# Patient Record
Sex: FEMALE | Race: BLACK OR AFRICAN AMERICAN | Employment: UNEMPLOYED | URBAN - METROPOLITAN AREA
[De-identification: names, ages, dates, MRNs, and addresses within clinical notes are randomized per-mention and may not be internally consistent; named-entity substitution may affect disease eponyms.]

---

## 2017-04-18 LAB
LEFT VENTRICULAR EJECTION FRACTION HIGH VALUE: 60 %
LEFT VENTRICULAR EJECTION FRACTION MODE: NORMAL
LV EF: 55 %

## 2018-05-28 ENCOUNTER — APPOINTMENT (OUTPATIENT)
Dept: GENERAL RADIOLOGY | Age: 63
DRG: 871 | End: 2018-05-28
Attending: EMERGENCY MEDICINE
Payer: MEDICARE

## 2018-05-28 ENCOUNTER — HOSPITAL ENCOUNTER (INPATIENT)
Age: 63
LOS: 2 days | Discharge: HOME | DRG: 871 | End: 2018-05-30
Attending: EMERGENCY MEDICINE | Admitting: INTERNAL MEDICINE
Payer: MEDICARE

## 2018-05-28 DIAGNOSIS — J96.01 ACUTE RESPIRATORY FAILURE WITH HYPOXIA (HCC): ICD-10-CM

## 2018-05-28 DIAGNOSIS — N18.9 CHRONIC RENAL FAILURE, UNSPECIFIED CKD STAGE: ICD-10-CM

## 2018-05-28 DIAGNOSIS — J18.9 PNEUMONIA OF RIGHT LOWER LOBE DUE TO INFECTIOUS ORGANISM: ICD-10-CM

## 2018-05-28 DIAGNOSIS — A41.9 SEPSIS, DUE TO UNSPECIFIED ORGANISM: Primary | ICD-10-CM

## 2018-05-28 DIAGNOSIS — D63.8 ANEMIA OF CHRONIC DISEASE: ICD-10-CM

## 2018-05-28 LAB
ALBUMIN SERPL-MCNC: 2.9 G/DL (ref 3.5–5)
ALBUMIN/GLOB SERPL: 0.8 {RATIO} (ref 1.1–2.2)
ALP SERPL-CCNC: 134 U/L (ref 45–117)
ALT SERPL-CCNC: 134 U/L (ref 12–78)
ANION GAP SERPL CALC-SCNC: 8 MMOL/L (ref 5–15)
APPEARANCE UR: CLEAR
ARTERIAL PATENCY WRIST A: ABNORMAL
AST SERPL-CCNC: 81 U/L (ref 15–37)
BACTERIA URNS QL MICRO: ABNORMAL /HPF
BASE DEFICIT BLDA-SCNC: 4.6 MMOL/L
BASOPHILS # BLD: 0 K/UL (ref 0–0.1)
BASOPHILS NFR BLD: 0 % (ref 0–1)
BDY SITE: ABNORMAL
BILIRUB SERPL-MCNC: 0.9 MG/DL (ref 0.2–1)
BILIRUB UR QL: NEGATIVE
BNP SERPL-MCNC: 4172 PG/ML (ref 0–125)
BREATHS.SPONTANEOUS ON VENT: 18
BUN SERPL-MCNC: 61 MG/DL (ref 6–20)
BUN/CREAT SERPL: 19 (ref 12–20)
CALCIUM SERPL-MCNC: 7.8 MG/DL (ref 8.5–10.1)
CHLORIDE SERPL-SCNC: 111 MMOL/L (ref 97–108)
CK MB CFR SERPL CALC: 3 % (ref 0–2.5)
CK MB SERPL-MCNC: 2.4 NG/ML (ref 5–25)
CK SERPL-CCNC: 81 U/L (ref 26–192)
CO2 SERPL-SCNC: 20 MMOL/L (ref 21–32)
COLOR UR: ABNORMAL
CREAT SERPL-MCNC: 3.27 MG/DL (ref 0.55–1.02)
DIFFERENTIAL METHOD BLD: ABNORMAL
EOSINOPHIL # BLD: 0 K/UL (ref 0–0.4)
EOSINOPHIL NFR BLD: 0 % (ref 0–7)
EPITH CASTS URNS QL MICRO: ABNORMAL /LPF
ERYTHROCYTE [DISTWIDTH] IN BLOOD BY AUTOMATED COUNT: 17 % (ref 11.5–14.5)
GLOBULIN SER CALC-MCNC: 3.7 G/DL (ref 2–4)
GLUCOSE BLD STRIP.AUTO-MCNC: 131 MG/DL (ref 65–100)
GLUCOSE BLD STRIP.AUTO-MCNC: 136 MG/DL (ref 65–100)
GLUCOSE BLD STRIP.AUTO-MCNC: 136 MG/DL (ref 65–100)
GLUCOSE SERPL-MCNC: 131 MG/DL (ref 65–100)
GLUCOSE UR STRIP.AUTO-MCNC: NEGATIVE MG/DL
HCO3 BLDA-SCNC: 21 MMOL/L (ref 22–26)
HCT VFR BLD AUTO: 23.8 % (ref 35–47)
HGB BLD-MCNC: 7.4 G/DL (ref 11.5–16)
HGB UR QL STRIP: NEGATIVE
IMM GRANULOCYTES # BLD: 0.2 K/UL (ref 0–0.04)
IMM GRANULOCYTES NFR BLD AUTO: 1 % (ref 0–0.5)
KETONES UR QL STRIP.AUTO: NEGATIVE MG/DL
LACTATE SERPL-SCNC: 0.7 MMOL/L (ref 0.4–2)
LEUKOCYTE ESTERASE UR QL STRIP.AUTO: ABNORMAL
LYMPHOCYTES # BLD: 1 K/UL (ref 0.8–3.5)
LYMPHOCYTES NFR BLD: 6 % (ref 12–49)
MCH RBC QN AUTO: 25.3 PG (ref 26–34)
MCHC RBC AUTO-ENTMCNC: 31.1 G/DL (ref 30–36.5)
MCV RBC AUTO: 81.5 FL (ref 80–99)
MONOCYTES # BLD: 1.5 K/UL (ref 0–1)
MONOCYTES NFR BLD: 8 % (ref 5–13)
NEUTS SEG # BLD: 16.3 K/UL (ref 1.8–8)
NEUTS SEG NFR BLD: 86 % (ref 32–75)
NITRITE UR QL STRIP.AUTO: NEGATIVE
NRBC # BLD: 0 K/UL (ref 0–0.01)
NRBC BLD-RTO: 0 PER 100 WBC
PCO2 BLDA: 40 MMHG (ref 35–45)
PH BLDA: 7.33 [PH] (ref 7.35–7.45)
PH UR STRIP: 5 [PH] (ref 5–8)
PLATELET # BLD AUTO: 171 K/UL (ref 150–400)
PMV BLD AUTO: 12.5 FL (ref 8.9–12.9)
PO2 BLDA: 57 MMHG (ref 80–100)
POTASSIUM SERPL-SCNC: 4.2 MMOL/L (ref 3.5–5.1)
PROT SERPL-MCNC: 6.6 G/DL (ref 6.4–8.2)
PROT UR STRIP-MCNC: 100 MG/DL
RBC # BLD AUTO: 2.92 M/UL (ref 3.8–5.2)
RBC #/AREA URNS HPF: ABNORMAL /HPF (ref 0–5)
SAO2 % BLD: 88 % (ref 92–97)
SAO2% DEVICE SAO2% SENSOR NAME: ABNORMAL
SERVICE CMNT-IMP: ABNORMAL
SODIUM SERPL-SCNC: 139 MMOL/L (ref 136–145)
SP GR UR REFRACTOMETRY: 1.01 (ref 1–1.03)
SPECIMEN SITE: ABNORMAL
TROPONIN I SERPL-MCNC: 0.08 NG/ML
TROPONIN I SERPL-MCNC: 0.08 NG/ML
UROBILINOGEN UR QL STRIP.AUTO: 0.2 EU/DL (ref 0.2–1)
WBC # BLD AUTO: 19.1 K/UL (ref 3.6–11)
WBC URNS QL MICRO: ABNORMAL /HPF (ref 0–4)
YEAST BUDDING URNS QL: PRESENT

## 2018-05-28 PROCEDURE — 65660000000 HC RM CCU STEPDOWN

## 2018-05-28 PROCEDURE — 74011250636 HC RX REV CODE- 250/636: Performed by: INTERNAL MEDICINE

## 2018-05-28 PROCEDURE — 81001 URINALYSIS AUTO W/SCOPE: CPT | Performed by: EMERGENCY MEDICINE

## 2018-05-28 PROCEDURE — 94640 AIRWAY INHALATION TREATMENT: CPT

## 2018-05-28 PROCEDURE — 96374 THER/PROPH/DIAG INJ IV PUSH: CPT

## 2018-05-28 PROCEDURE — 74011250636 HC RX REV CODE- 250/636: Performed by: EMERGENCY MEDICINE

## 2018-05-28 PROCEDURE — 74011000258 HC RX REV CODE- 258: Performed by: EMERGENCY MEDICINE

## 2018-05-28 PROCEDURE — 74011000250 HC RX REV CODE- 250: Performed by: EMERGENCY MEDICINE

## 2018-05-28 PROCEDURE — 36415 COLL VENOUS BLD VENIPUNCTURE: CPT | Performed by: EMERGENCY MEDICINE

## 2018-05-28 PROCEDURE — 80053 COMPREHEN METABOLIC PANEL: CPT | Performed by: EMERGENCY MEDICINE

## 2018-05-28 PROCEDURE — 99285 EMERGENCY DEPT VISIT HI MDM: CPT

## 2018-05-28 PROCEDURE — 82962 GLUCOSE BLOOD TEST: CPT

## 2018-05-28 PROCEDURE — 87040 BLOOD CULTURE FOR BACTERIA: CPT | Performed by: EMERGENCY MEDICINE

## 2018-05-28 PROCEDURE — 84484 ASSAY OF TROPONIN QUANT: CPT | Performed by: EMERGENCY MEDICINE

## 2018-05-28 PROCEDURE — 93005 ELECTROCARDIOGRAM TRACING: CPT

## 2018-05-28 PROCEDURE — 85025 COMPLETE CBC W/AUTO DIFF WBC: CPT | Performed by: EMERGENCY MEDICINE

## 2018-05-28 PROCEDURE — 82550 ASSAY OF CK (CPK): CPT | Performed by: EMERGENCY MEDICINE

## 2018-05-28 PROCEDURE — 36600 WITHDRAWAL OF ARTERIAL BLOOD: CPT | Performed by: INTERNAL MEDICINE

## 2018-05-28 PROCEDURE — 74011000250 HC RX REV CODE- 250: Performed by: INTERNAL MEDICINE

## 2018-05-28 PROCEDURE — 83605 ASSAY OF LACTIC ACID: CPT | Performed by: EMERGENCY MEDICINE

## 2018-05-28 PROCEDURE — 83880 ASSAY OF NATRIURETIC PEPTIDE: CPT | Performed by: EMERGENCY MEDICINE

## 2018-05-28 PROCEDURE — 87070 CULTURE OTHR SPECIMN AEROBIC: CPT | Performed by: INTERNAL MEDICINE

## 2018-05-28 PROCEDURE — 77030029684 HC NEB SM VOL KT MONA -A

## 2018-05-28 PROCEDURE — 71045 X-RAY EXAM CHEST 1 VIEW: CPT

## 2018-05-28 PROCEDURE — 74011250637 HC RX REV CODE- 250/637: Performed by: INTERNAL MEDICINE

## 2018-05-28 PROCEDURE — 82803 BLOOD GASES ANY COMBINATION: CPT | Performed by: INTERNAL MEDICINE

## 2018-05-28 PROCEDURE — 74011636637 HC RX REV CODE- 636/637: Performed by: INTERNAL MEDICINE

## 2018-05-28 RX ORDER — IPRATROPIUM BROMIDE AND ALBUTEROL SULFATE 2.5; .5 MG/3ML; MG/3ML
3 SOLUTION RESPIRATORY (INHALATION)
Status: DISCONTINUED | OUTPATIENT
Start: 2018-05-28 | End: 2018-05-29

## 2018-05-28 RX ORDER — INSULIN LISPRO 100 [IU]/ML
INJECTION, SOLUTION INTRAVENOUS; SUBCUTANEOUS
Status: DISCONTINUED | OUTPATIENT
Start: 2018-05-28 | End: 2018-05-30 | Stop reason: HOSPADM

## 2018-05-28 RX ORDER — FLUTICASONE PROPIONATE 50 MCG
2 SPRAY, SUSPENSION (ML) NASAL 2 TIMES DAILY
COMMUNITY

## 2018-05-28 RX ORDER — ESCITALOPRAM OXALATE 10 MG/1
5 TABLET ORAL DAILY
Status: DISCONTINUED | OUTPATIENT
Start: 2018-05-29 | End: 2018-05-30 | Stop reason: HOSPADM

## 2018-05-28 RX ORDER — ACETAMINOPHEN 325 MG/1
650 TABLET ORAL
Status: DISCONTINUED | OUTPATIENT
Start: 2018-05-28 | End: 2018-05-30 | Stop reason: HOSPADM

## 2018-05-28 RX ORDER — LORATADINE 10 MG/1
10 TABLET ORAL DAILY
COMMUNITY

## 2018-05-28 RX ORDER — HYDRALAZINE HYDROCHLORIDE 50 MG/1
100 TABLET, FILM COATED ORAL 3 TIMES DAILY
Status: DISCONTINUED | OUTPATIENT
Start: 2018-05-28 | End: 2018-05-30 | Stop reason: HOSPADM

## 2018-05-28 RX ORDER — ALBUTEROL SULFATE 0.83 MG/ML
10 SOLUTION RESPIRATORY (INHALATION)
Status: COMPLETED | OUTPATIENT
Start: 2018-05-28 | End: 2018-05-28

## 2018-05-28 RX ORDER — LEVOFLOXACIN 5 MG/ML
750 INJECTION, SOLUTION INTRAVENOUS
Status: DISCONTINUED | OUTPATIENT
Start: 2018-05-28 | End: 2018-05-29

## 2018-05-28 RX ORDER — ATORVASTATIN CALCIUM 40 MG/1
80 TABLET, FILM COATED ORAL DAILY
Status: DISCONTINUED | OUTPATIENT
Start: 2018-05-29 | End: 2018-05-30 | Stop reason: HOSPADM

## 2018-05-28 RX ORDER — DOCUSATE SODIUM 100 MG/1
100 CAPSULE, LIQUID FILLED ORAL
Status: DISCONTINUED | OUTPATIENT
Start: 2018-05-28 | End: 2018-05-30 | Stop reason: HOSPADM

## 2018-05-28 RX ORDER — SODIUM CHLORIDE 0.9 % (FLUSH) 0.9 %
5-10 SYRINGE (ML) INJECTION AS NEEDED
Status: DISCONTINUED | OUTPATIENT
Start: 2018-05-28 | End: 2018-05-30 | Stop reason: HOSPADM

## 2018-05-28 RX ORDER — IPRATROPIUM BROMIDE AND ALBUTEROL SULFATE 2.5; .5 MG/3ML; MG/3ML
3 SOLUTION RESPIRATORY (INHALATION)
COMMUNITY
End: 2018-10-17

## 2018-05-28 RX ORDER — CARVEDILOL 12.5 MG/1
25 TABLET ORAL 2 TIMES DAILY WITH MEALS
Status: DISCONTINUED | OUTPATIENT
Start: 2018-05-28 | End: 2018-05-30 | Stop reason: HOSPADM

## 2018-05-28 RX ORDER — AMLODIPINE BESYLATE 5 MG/1
5 TABLET ORAL 2 TIMES DAILY
COMMUNITY
End: 2018-10-17

## 2018-05-28 RX ORDER — ASPIRIN 81 MG/1
81 TABLET ORAL DAILY
Status: DISCONTINUED | OUTPATIENT
Start: 2018-05-29 | End: 2018-05-30 | Stop reason: HOSPADM

## 2018-05-28 RX ORDER — ATORVASTATIN CALCIUM 40 MG/1
80 TABLET, FILM COATED ORAL DAILY
COMMUNITY
End: 2018-10-17

## 2018-05-28 RX ORDER — ATENOLOL 100 MG/1
100 TABLET ORAL DAILY
COMMUNITY
End: 2018-05-28

## 2018-05-28 RX ORDER — ASPIRIN 81 MG/1
81 TABLET ORAL DAILY
COMMUNITY

## 2018-05-28 RX ORDER — DIPHENHYDRAMINE HYDROCHLORIDE 50 MG/ML
12.5 INJECTION, SOLUTION INTRAMUSCULAR; INTRAVENOUS
Status: DISCONTINUED | OUTPATIENT
Start: 2018-05-28 | End: 2018-05-30 | Stop reason: HOSPADM

## 2018-05-28 RX ORDER — AMLODIPINE BESYLATE 5 MG/1
5 TABLET ORAL 2 TIMES DAILY
Status: DISCONTINUED | OUTPATIENT
Start: 2018-05-28 | End: 2018-05-30 | Stop reason: HOSPADM

## 2018-05-28 RX ORDER — ONDANSETRON 2 MG/ML
4 INJECTION INTRAMUSCULAR; INTRAVENOUS
Status: DISCONTINUED | OUTPATIENT
Start: 2018-05-28 | End: 2018-05-30 | Stop reason: HOSPADM

## 2018-05-28 RX ORDER — SODIUM CHLORIDE 0.9 % (FLUSH) 0.9 %
5-10 SYRINGE (ML) INJECTION EVERY 8 HOURS
Status: DISCONTINUED | OUTPATIENT
Start: 2018-05-28 | End: 2018-05-30 | Stop reason: HOSPADM

## 2018-05-28 RX ORDER — PAROXETINE HYDROCHLORIDE 20 MG/1
30 TABLET, FILM COATED ORAL DAILY
Status: DISCONTINUED | OUTPATIENT
Start: 2018-05-29 | End: 2018-05-30 | Stop reason: HOSPADM

## 2018-05-28 RX ORDER — AMLODIPINE BESYLATE 10 MG/1
TABLET ORAL DAILY
COMMUNITY
End: 2018-05-28

## 2018-05-28 RX ORDER — VANCOMYCIN 1.75 GRAM/500 ML IN 0.9 % SODIUM CHLORIDE INTRAVENOUS
1750
Status: DISCONTINUED | OUTPATIENT
Start: 2018-05-30 | End: 2018-05-30

## 2018-05-28 RX ORDER — LORATADINE 10 MG/1
10 TABLET ORAL DAILY
Status: DISCONTINUED | OUTPATIENT
Start: 2018-05-29 | End: 2018-05-30 | Stop reason: HOSPADM

## 2018-05-28 RX ORDER — INSULIN LISPRO 100 [IU]/ML
16 INJECTION, SOLUTION INTRAVENOUS; SUBCUTANEOUS
COMMUNITY

## 2018-05-28 RX ORDER — INSULIN GLARGINE 100 [IU]/ML
16 INJECTION, SOLUTION SUBCUTANEOUS
Status: DISCONTINUED | OUTPATIENT
Start: 2018-05-28 | End: 2018-05-30 | Stop reason: HOSPADM

## 2018-05-28 RX ORDER — GUAIFENESIN 600 MG/1
600 TABLET, EXTENDED RELEASE ORAL EVERY 12 HOURS
Status: DISCONTINUED | OUTPATIENT
Start: 2018-05-28 | End: 2018-05-30 | Stop reason: HOSPADM

## 2018-05-28 RX ORDER — CARVEDILOL 25 MG/1
25 TABLET ORAL 2 TIMES DAILY WITH MEALS
COMMUNITY

## 2018-05-28 RX ORDER — FUROSEMIDE 40 MG/1
40 TABLET ORAL DAILY
COMMUNITY
End: 2018-10-17

## 2018-05-28 RX ORDER — MAGNESIUM SULFATE 100 %
4 CRYSTALS MISCELLANEOUS AS NEEDED
Status: DISCONTINUED | OUTPATIENT
Start: 2018-05-28 | End: 2018-05-30 | Stop reason: HOSPADM

## 2018-05-28 RX ORDER — DEXTROSE 50 % IN WATER (D50W) INTRAVENOUS SYRINGE
12.5-25 AS NEEDED
Status: DISCONTINUED | OUTPATIENT
Start: 2018-05-28 | End: 2018-05-30 | Stop reason: HOSPADM

## 2018-05-28 RX ORDER — ATENOLOL 50 MG/1
100 TABLET ORAL DAILY
Status: DISCONTINUED | OUTPATIENT
Start: 2018-05-29 | End: 2018-05-28

## 2018-05-28 RX ORDER — ERGOCALCIFEROL 1.25 MG/1
50000 CAPSULE ORAL
COMMUNITY

## 2018-05-28 RX ORDER — PAROXETINE HYDROCHLORIDE 20 MG/1
60 TABLET, FILM COATED ORAL DAILY
Status: DISCONTINUED | OUTPATIENT
Start: 2018-05-29 | End: 2018-05-28

## 2018-05-28 RX ORDER — INSULIN LISPRO 100 [IU]/ML
INJECTION, SOLUTION INTRAVENOUS; SUBCUTANEOUS
COMMUNITY
End: 2018-10-17

## 2018-05-28 RX ORDER — ATENOLOL 100 MG/1
100 TABLET ORAL DAILY
Status: DISCONTINUED | OUTPATIENT
Start: 2018-05-29 | End: 2018-05-28

## 2018-05-28 RX ORDER — PAROXETINE 30 MG/1
30 TABLET, FILM COATED ORAL DAILY
COMMUNITY

## 2018-05-28 RX ORDER — FLUTICASONE PROPIONATE 50 MCG
2 SPRAY, SUSPENSION (ML) NASAL DAILY
Status: DISCONTINUED | OUTPATIENT
Start: 2018-05-29 | End: 2018-05-30 | Stop reason: HOSPADM

## 2018-05-28 RX ORDER — INSULIN GLARGINE 100 [IU]/ML
20 INJECTION, SOLUTION SUBCUTANEOUS
COMMUNITY

## 2018-05-28 RX ORDER — HYDRALAZINE HYDROCHLORIDE 100 MG/1
100 TABLET, FILM COATED ORAL 3 TIMES DAILY
COMMUNITY
End: 2018-10-17

## 2018-05-28 RX ADMIN — GUAIFENESIN 600 MG: 600 TABLET, EXTENDED RELEASE ORAL at 21:08

## 2018-05-28 RX ADMIN — METHYLPREDNISOLONE SODIUM SUCCINATE 125 MG: 125 INJECTION, POWDER, FOR SOLUTION INTRAMUSCULAR; INTRAVENOUS at 14:03

## 2018-05-28 RX ADMIN — VANCOMYCIN HYDROCHLORIDE 3000 MG: 10 INJECTION, POWDER, LYOPHILIZED, FOR SOLUTION INTRAVENOUS at 21:08

## 2018-05-28 RX ADMIN — CARVEDILOL 25 MG: 12.5 TABLET, FILM COATED ORAL at 19:37

## 2018-05-28 RX ADMIN — Medication 10 ML: at 23:17

## 2018-05-28 RX ADMIN — DIPHENHYDRAMINE HYDROCHLORIDE 12.5 MG: 50 INJECTION, SOLUTION INTRAMUSCULAR; INTRAVENOUS at 23:16

## 2018-05-28 RX ADMIN — INSULIN GLARGINE 16 UNITS: 100 INJECTION, SOLUTION SUBCUTANEOUS at 21:44

## 2018-05-28 RX ADMIN — Medication 10 ML: at 21:08

## 2018-05-28 RX ADMIN — Medication 10 ML: at 19:38

## 2018-05-28 RX ADMIN — IPRATROPIUM BROMIDE AND ALBUTEROL SULFATE 3 ML: .5; 3 SOLUTION RESPIRATORY (INHALATION) at 20:17

## 2018-05-28 RX ADMIN — Medication 10 ML: at 23:24

## 2018-05-28 RX ADMIN — ALBUTEROL SULFATE 10 MG: 2.5 SOLUTION RESPIRATORY (INHALATION) at 14:03

## 2018-05-28 RX ADMIN — LEVOFLOXACIN 750 MG: 5 INJECTION, SOLUTION INTRAVENOUS at 17:09

## 2018-05-28 RX ADMIN — METHYLPREDNISOLONE SODIUM SUCCINATE 40 MG: 40 INJECTION, POWDER, FOR SOLUTION INTRAMUSCULAR; INTRAVENOUS at 23:17

## 2018-05-28 RX ADMIN — PIPERACILLIN SODIUM,TAZOBACTAM SODIUM 3.38 G: 3; .375 INJECTION, POWDER, FOR SOLUTION INTRAVENOUS at 16:10

## 2018-05-28 RX ADMIN — AMLODIPINE BESYLATE 5 MG: 5 TABLET ORAL at 21:08

## 2018-05-28 RX ADMIN — HYDRALAZINE HYDROCHLORIDE 100 MG: 50 TABLET, FILM COATED ORAL at 21:08

## 2018-05-28 NOTE — PROGRESS NOTES
Pharmacy Automatic Renal Dosing Protocol - Antimicrobials    Indication for Antimicrobials: HCAP     Current Regimen of Each Antimicrobial:  Vancomycin 3000mg IV loading, then 1750mg IV every 48hr (Start Date 18; Day # )  Levofloxacin 750mg IV every 48hr (start date: 18, Day 1)  Zosyn 3.375g IV x 1     Previous Antimicrobial Therapy:      Goal Level: VANCOMYCIN TROUGH GOAL RANGE    Vancomycin Trough: 15 - 20 mcg/mL    Date Dose & Interval Measured (mcg/mL) Extrapolated (mcg/mL)                       Significant Cultures:   18---paired blood - pending    Radiology / Imaging results: (X-ray, CT scan or MRI):   --chest xray- right lower lobe pneumonia and/or asymmetric pulmonary edema    Paralysis, amputations, malnutrition: n/a    Labs:  Recent Labs      18   1335   CREA  3.27*   BUN  61*   WBC  19.1*     Temp (24hrs), Av.8 °F (37.1 °C), Min:98.8 °F (37.1 °C), Max:98.8 °F (37.1 °C)    Creatinine Clearance (mL/min) or Dialysis: ~16mL/min (IDW)    Impression/Plan:   · Levofloxacin 750mg IV every 48hr ok for indication and renal function, patient is borderline for dose adjustment to 500mg every 48hr, but renal might be improved with  labs. Will adjust based on  BMP. · Zosyn 3.375g IV x 1 given in ED  · Vancomycin 3000mg IV loading dose, followed by 1750mg IV every 48hr to yield a trough of 16mcg/mL based on population kinetic parameters. Vancomycin pharmacy to dose, with duration of 7 days  · BMP ordered by MD for   · Antimicrobial stop date 7 days vanco, TBD for levofloxacin. Pharmacy will follow daily and adjust medications as appropriate for renal function and/or serum levels. Thank you,  GERMAIN Paulson    Recommended duration of therapy  [email or web address]/[place]/[facility]/Pharmacy/Clinical%20Companion/Duration%20of%[email or web address]. docx    Renal Dosing  [email or web address]/[place]/[facility]/Pharmacy/Clinical%20Companion/Renal%[id number]. pdf

## 2018-05-28 NOTE — ED PROVIDER NOTES
EMERGENCY DEPARTMENT HISTORY AND PHYSICAL EXAM      Date: 5/28/2018  Patient Name: Escobar Pompa    History of Presenting Illness     Chief Complaint   Patient presents with    Shortness of Breath     pt checked her blood sugar and found it low (it has since recovered), which caused her to have an anxiety attack with sob. Pt called EMS. on presentation to the ED, pt states her anxiety is improved, but not resolved; however, she is still experiencing dyspnea with audible wheezes and rhonchi    Anxiety       History Provided By: Patient and EMS    HPI: Escobar Pompa, 60 y.o. female with PMHx significant for COPD, DM, HTN, presents via EMS to the ED for evaluation of worsening SOB and anxiety ~1:00 AM today after checking her blood sugar and finding it to be low. Pt states she checked her blood sugar before going to bed and was noted to be 111, but dropped to 76 ~1:00 AM, which caused her to panic. Per EMS, she was anxious and panicking with deep breaths. She was sating at 90% on RA and was placed on 4L sating at 92%. Upon arrival, her anxiety has improved. However, she still is experiencing SOB. Pt endorses SOB for the past couple of days with associated cough. She states breathing is worse with any exertion. Pt notes brown sputum production with her cough, but has been unable to bring anything up with her cough today. She reports non-radiating L sided CP earlier this morning that occurred every couple of minutes intermittently before resolving. Pt conveys CP was 7/10 in severity and specifically denies any associated N/V or diaphoresis. She reports she was admitted at [facility] for 7 days for newly diagnosed COPD. During her hospitalization, she states her Cr went from 4.1 to 2.9 and informs she has a hx of CKD. She further adds experiencing intermittent bilateral lower extremity tingling for the past couple of days, but denies any leg pain.  Additionally, she notes intermittent bilateral leg swelling, but none today. Pt denies being on home O2. She specifically denies any recent fevers, chills, N/V, or abdominal pain. Social hx: -tobacco, -EtOH, -illicit drugs    There are no other complaints, changes, or physical findings at this time. PCP: Wille Harada, NP    Past History     Past Medical History:  Past Medical History:   Diagnosis Date    Diabetes ([address]     Hypertension        Past Surgical History:  Past Surgical History:   Procedure Laterality Date    HX CHOLECYSTECTOMY      HX GYN          HX HEENT      tonsillectomy       Family History:  No family history on file. Social History:  Social History   Substance Use Topics    Smoking status: Never Smoker    Smokeless tobacco: Never Used    Alcohol use No       Allergies: Allergies   Allergen Reactions    Codeine Hives    Lisinopril Hives         Review of Systems   Review of Systems   Constitutional: Negative for chills, diaphoresis and fever. HENT: Negative for congestion. Eyes: Negative for visual disturbance. Respiratory: Positive for cough and shortness of breath. Cardiovascular: Positive for chest pain and leg swelling (none today). Gastrointestinal: Negative for abdominal pain, nausea and vomiting. Endocrine: Negative for heat intolerance. Genitourinary: Negative for dysuria. Musculoskeletal: Negative for myalgias. Skin: Negative for rash. Allergic/Immunologic: Negative for immunocompromised state. Neurological: Positive for numbness (tingling in BL lower extremities). Hematological: Does not bruise/bleed easily. Psychiatric/Behavioral: The patient is nervous/anxious. All other systems reviewed and are negative. Physical Exam   Physical Exam   Constitutional: She is oriented to person, place, and time. She appears well-developed. No distress. Elevated BMI   HENT:   Head: Normocephalic and atraumatic. Eyes: EOM are normal. Pupils are equal, round, and reactive to light.    Neck: Normal range of motion. Neck supple. Cardiovascular: Normal rate, regular rhythm and normal heart sounds. Pulmonary/Chest:   Moderate respiratory distress  Rales and rhonchi throughout  Chest wall tenderness   Abdominal: Soft. Bowel sounds are normal. She exhibits no mass. There is no tenderness. Musculoskeletal: Normal range of motion. 2+ edema bilateral lower extremities, non-tender. Neurological: She is alert and oriented to person, place, and time. Coordination normal.   Skin: Skin is warm and dry. Psychiatric: She has a normal mood and affect. Her behavior is normal.   Nursing note and vitals reviewed. Diagnostic Study Results   Labs    Recent Results (from the past 12 hour(s))   GLUCOSE, POC    Collection Time: 05/28/18  1:05 PM   Result Value Ref Range    Glucose (POC) 136 (H) 65 - 100 mg/dL    Performed by Nan Bhardwaj    CBC WITH AUTOMATED DIFF    Collection Time: 05/28/18  1:35 PM   Result Value Ref Range    WBC 19.1 (H) 3.6 - 11.0 K/uL    RBC 2.92 (L) 3.80 - 5.20 M/uL    HGB 7.4 (L) 11.5 - 16.0 g/dL    HCT 23.8 (L) 35.0 - 47.0 %    MCV 81.5 80.0 - 99.0 FL    MCH 25.3 (L) 26.0 - 34.0 PG    MCHC 31.1 30.0 - 36.5 g/dL    RDW 17.0 (H) 11.5 - 14.5 %    PLATELET 855 086 - 708 K/uL    MPV 12.5 8.9 - 12.9 FL    NRBC 0.0 0  WBC    ABSOLUTE NRBC 0.00 0.00 - 0.01 K/uL    NEUTROPHILS 86 (H) 32 - 75 %    LYMPHOCYTES 6 (L) 12 - 49 %    MONOCYTES 8 5 - 13 %    EOSINOPHILS 0 0 - 7 %    BASOPHILS 0 0 - 1 %    IMMATURE GRANULOCYTES 1 (H) 0.0 - 0.5 %    ABS. NEUTROPHILS 16.3 (H) 1.8 - 8.0 K/UL    ABS. LYMPHOCYTES 1.0 0.8 - 3.5 K/UL    ABS. MONOCYTES 1.5 (H) 0.0 - 1.0 K/UL    ABS. EOSINOPHILS 0.0 0.0 - 0.4 K/UL    ABS. BASOPHILS 0.0 0.0 - 0.1 K/UL    ABS. IMM.  GRANS. 0.2 (H) 0.00 - 0.04 K/UL    DF AUTOMATED     METABOLIC PANEL, COMPREHENSIVE    Collection Time: 05/28/18  1:35 PM   Result Value Ref Range    Sodium 139 136 - 145 mmol/L    Potassium 4.2 3.5 - 5.1 mmol/L    Chloride 111 (H) 97 - 108 mmol/L    CO2 20 (L) 21 - 32 mmol/L    Anion gap 8 5 - 15 mmol/L    Glucose 131 (H) 65 - 100 mg/dL    BUN 61 (H) 6 - 20 MG/DL    Creatinine 3.27 (H) 0.55 - 1.02 MG/DL    BUN/Creatinine ratio 19 12 - 20      GFR est AA 17 (L) >60 ml/min/1.73m2    GFR est non-AA 14 (L) >60 ml/min/1.73m2    Calcium 7.8 (L) 8.5 - 10.1 MG/DL    Bilirubin, total 0.9 0.2 - 1.0 MG/DL    ALT (SGPT) 134 (H) 12 - 78 U/L    AST (SGOT) 81 (H) 15 - 37 U/L    Alk. phosphatase 134 (H) 45 - 117 U/L    Protein, total 6.6 6.4 - 8.2 g/dL    Albumin 2.9 (L) 3.5 - 5.0 g/dL    Globulin 3.7 2.0 - 4.0 g/dL    A-G Ratio 0.8 (L) 1.1 - 2.2     NT-PRO BNP    Collection Time: 05/28/18  1:35 PM   Result Value Ref Range    NT pro-BNP 4172 (H) 0 - 125 PG/ML   CK W/ CKMB & INDEX    Collection Time: 05/28/18  1:35 PM   Result Value Ref Range    CK 81 26 - 192 U/L    CK - MB 2.4 <3.6 NG/ML    CK-MB Index 3.0 (H) 0 - 2.5     TROPONIN I    Collection Time: 05/28/18  1:35 PM   Result Value Ref Range    Troponin-I, Qt. 0.08 (H) <0.05 ng/mL       Radiologic Studies -   CXR Results  (Last 48 hours)               05/28/18 1422  XR CHEST PORT Final result    Impression:  IMPRESSION: Right lower lobe pneumonia and/or asymmetric pulmonary edema. Narrative:  EXAM: Portable CXR. 1415 hours         INDICATION: Chest Pain       COMPARISON: None. There is right lower lobe airspace disease and bilateral perihilar haziness with   considerations including right lower lobe pneumonia and/or asymmetric pulmonary   edema. Heart is borderline large. There is no pneumothorax, midline shift or   sizable pleural effusion. Medical Decision Making   I am the first provider for this patient. I reviewed the vital signs, available nursing notes, past medical history, past surgical history, family history and social history. Vital Signs-Reviewed the patient's vital signs.   Patient Vitals for the past 12 hrs:   Temp Pulse Resp BP SpO2   05/28/18 1330 - 79 23 - 97 %   05/28/18 1300 - - - 131/57 96 %   05/28/18 1255 98.8 °F (37.1 °C) 80 20 96/70 96 %       Pulse Oximetry Analysis - 97% on RA    Cardiac Monitor:   Rate: 80 bpm  Rhythm: Normal Sinus Rhythm     EKG interpretation: (Preliminary) 13:03  Rhythm: normal sinus rhythm; and regular . Rate (approx.): 80; Axis: normal; AK interval: normal; QRS interval: normal ; ST/T wave: normal; Other findings: no prior EKGs. Written by Sergei Zaragoza, ED Scribe, as dictated by Cori Zhu MD.    Records Reviewed: Nursing Notes, Old Medical Records and Ambulance Run Sheet    Provider Notes (Medical Decision Making):   DDx: CHF, COPD, CAD, respiratory failure, PNA, bronchitis, peripheral edema    ED Course:   Initial assessment performed. The patients presenting problems have been discussed, and they are in agreement with the care plan formulated and outlined with them. I have encouraged them to ask questions as they arise throughout their visit. 2:59 PM  Will hold off on fluid bolus given pt has a hx of CHF and elevated BNP today. CONSULT NOTE:   3:03 PM  Cori Zhu MD spoke with Yimi Maciel MD,   Specialty: Hospitalist  Discussed pt's hx, disposition, and available diagnostic and imaging results. Reviewed care plans. Consultant will evaluate pt for admission. Written by Sergei Zaragoza ED Scribe, as dictated by Cori Zhu MD.    4:57 PM  Received medical records from [facility]. Pt was admitted from 5/13-5/20 for AKA with acute CKD, pulmonary hypertension, and NSTEMI.  Will update hospitalist.    CRITICAL CARE NOTE:  5:34 PM  IMPENDING DETERIORATION -Respiratory, Cardiovascular, Metabolic and Renal  ASSOCIATED RISK FACTORS - Hypotension, Hypoxia, Dysrhythmia, Metabolic changes and Dehydration  MANAGEMENT- Bedside Assessment and Supervision of Care  INTERPRETATION -  Xrays, Blood Gases, ECG and Blood Pressure  INTERVENTIONS - hemodynamic mngmt and Metobolic interventions  CASE REVIEW - Hospitalist and Nursing  TREATMENT RESPONSE -Unchanged   PERFORMED BY - Self  NOTES   :  I have spent 76 minutes of critical care time involved in lab review, consultations with specialist, family decision- making, bedside attention and documentation. During this entire length of time I was immediately available to the patient. Naina Morse MD    PLAN:  1. Admit to Hospitalist    Admission Note:  3:08 PM  Patient is being admitted to the hospital by Dr. Sheri Mitchell. The results of their tests and reasons for their admission have been discussed with them and available family. They convey agreement and understanding for the need to be admitted and for their admission diagnosis. Diagnosis     Clinical Impression:   1. Sepsis, due to unspecified organism ([address]    2. Pneumonia of right lower lobe due to infectious organism ([address]    3. Acute respiratory failure with hypoxia (HCC)    4. Chronic renal failure, unspecified CKD stage    5. Anemia of chronic disease        Attestations: This note is prepared by Arianna Lloyd, acting as Scribe for Naina Morse MD.    The scribe's documentation has been prepared under my direction and personally reviewed by me in its entirety. I confirm that the note above accurately reflects all work, treatment, procedures, and medical decision making performed by me.   Naina Morse MD

## 2018-05-28 NOTE — H&P
Hospitalist Admission Note    NAME: Jocelin Adair   :  1955   MRN:  [id number]     Date/Time:  2018 4:16 PM    Patient PCP: Florentin Turpin NP  ________________________________________________________________________    My assessment of this patient's clinical condition and my plan of care is as follows. Assessment / Plan:  Sepsis due to pneumonia and COPD exacerbation  -RR >25, WBC 19, infiltrate on CXR, wheeze/rhonchi on exam  -given recent hospitalization will treat with broad antibiotics, vanc and levaquin  -standing nebs  -IV steroids  -follow cultures  -checked ABG - no significant CO2 retention    Chronic diastolic heart failure  -does not appear overloaded on my exam  -patient had negative stress test last week   -will hold lasix for now, consider restarting tomorrow    CKD stage 4  - states when she left [facility] her Cr was 2.9, now 3.27  -will hold lasix for now monitor Cr  -follows with Christel Alcantar nephrologist    DM2  -will order lower dose lantus  -SSI    Anemia, likely chronic disease  -started on EPO 10 K weekly last week    Anxiety with panic attacks  -patient and daughter are concerned that paxil is not working, discussed and will start tapering off paxil, will trial escitalopram    Obesity  Body mass index is 45.93 kg/(m^2). Code Status: full  Surrogate Decision Maker: daughter Bernardino Caban [phone]    DVT Prophylaxis: SCD  Baseline: independent, recently discharged from [facility] last week        Subjective:   CHIEF COMPLAINT:   Chief Complaint   Patient presents with    Shortness of Breath     pt checked her blood sugar and found it low (it has since recovered), which caused her to have an anxiety attack with sob. Pt called EMS.   on presentation to the ED, pt states her anxiety is improved, but not resolved; however, she is still experiencing dyspnea with audible wheezes and rhonchi    Anxiety       HISTORY OF PRESENT ILLNESS:     Rosalie Moody Juan Carlos Dale is a 60 y.o.  female with history of COPD, DM, HTN, CKD who presents with worsening SOB and wheezing. Patient was discharged from [facility] last week for COPD exacerbation, she is currently on a prednisone taper. She was feeling well until suddenly last night she began having shortness of breath and productive cough. She also was diagnosed NSTEMI and cardiomyopathy and had a stress test which was negative at that time. She denies any fevers or chills. She had associated chest wall pain which has now resolved she believes it was gas. We were asked to admit for work up and evaluation of the above problems. Past Medical History:   Diagnosis Date    CKD (chronic kidney disease) stage 4, GFR 15-29 ml/min (HCC)     COPD (chronic obstructive pulmonary disease) (HCC)     Diabetes (HCC)     Diastolic heart failure (HCC)     Hypertension     Pulmonary HTN (HCC)         Past Surgical History:   Procedure Laterality Date    HX CHOLECYSTECTOMY      HX GYN          HX HEENT      tonsillectomy       Social History   Substance Use Topics    Smoking status: Never Smoker    Smokeless tobacco: Never Used    Alcohol use No        Fam Hx: hypertension     Allergies   Allergen Reactions    Codeine Hives    Lisinopril Hives        Prior to Admission medications    Medication Sig Start Date End Date Taking? Authorizing Provider   atenolol (TENORMIN) 100 mg tablet Take 100 mg by mouth daily. Yes Magdalena Gmaa MD   atorvastatin (LIPITOR) 40 mg tablet Take 40 mg by mouth daily. Yes Magdalena Gama MD   insulin glargine (LANTUS U-100 INSULIN) 100 unit/mL injection 35 Units by SubCUTAneous route nightly. Yes Magdalena Gama MD   insulin lispro (HUMALOG U-100 INSULIN) 100 unit/mL injection 60 Units by SubCUTAneous route Before breakfast, lunch, and dinner. Yes Magdalena Gama MD   insulin lispro (HUMALOG U-100 INSULIN) 100 unit/mL injection by SubCUTAneous route.  Sliding scale   Yes Phys Other, MD   aspirin delayed-release 81 mg tablet Take 81 mg by mouth daily. Yes Magdalena Gama MD   loratadine (CLARITIN) 10 mg tablet Take 10 mg by mouth. Yes Magdalena Gama MD   albuterol-ipratropium (DUO-NEB) 2.5 mg-0.5 mg/3 ml nebu 3 mL by Nebulization route. Yes Magdalena Gama MD   furosemide (LASIX) 40 mg tablet Take 40 mg by mouth daily. Yes Magdalena Gama MD       REVIEW OF SYSTEMS:       Total of 12 systems reviewed as follows:       POSITIVE= underlined text  Negative = text not underlined  General:  fever, chills, sweats, generalized weakness, weight loss/gain,      loss of appetite   Eyes:    blurred vision, eye pain, loss of vision, double vision  ENT:    rhinorrhea, pharyngitis   Respiratory:   cough, sputum production, SOB, COOPER, wheezing, pleuritic pain   Cardiology:   chest pain, palpitations, orthopnea, PND, edema, syncope   Gastrointestinal:  abdominal pain , N/V, diarrhea, dysphagia, constipation, bleeding   Genitourinary:  frequency, urgency, dysuria, hematuria, incontinence   Muskuloskeletal :  arthralgia, myalgia, back pain  Hematology:  easy bruising, nose or gum bleeding, lymphadenopathy   Dermatological: rash, ulceration, pruritis, color change / jaundice  Endocrine:   hot flashes or polydipsia   Neurological:  headache, dizziness, confusion, focal weakness, paresthesia,     Speech difficulties, memory loss, gait difficulty  Psychological: Feelings of anxiety, depression, agitation    Objective:   VITALS:    Patient Vitals for the past 12 hrs:   Temp Pulse Resp BP SpO2   05/28/18 1545 - 78 22 - 96 %   05/28/18 1530 - 77 26 152/64 99 %   05/28/18 1500 - 79 21 142/58 98 %   05/28/18 1430 - 77 23 144/64 98 %   05/28/18 1330 - 79 23 - 97 %   05/28/18 1300 - - - 131/57 96 %   05/28/18 1255 98.8 °F (37.1 °C) 80 20 96/70 96 %         PHYSICAL EXAM:    General:    Drowsy but cooperative, no distress, appears stated age.      HEENT: Atraumatic, anicteric sclerae, pink conjunctivae     No oral ulcers, oral mucosa moist, throat clear, dentition fair  Neck:  Supple, symmetrical  Lungs:   Diffuse rhonchi in all lung fields, no crackles  Chest wall:  No tenderness, no accessory muscle use. Heart:   Regular rhythm, no murmur, no edema on my exam  Abdomen:   Soft, non-tender, not distended, bowel sounds normal  Extremities: No cyanosis, no clubbing, warm, well perfused, radial pulse 2+  Skin:     Not pale, not jaundiced, no rashes   Psych:  Good insight, not depressed, not anxious or agitated. Neurologic: EOMs intact, face symmetric, no aphasia or slurred speech, strength 5/5 in BUE, 5/5 in BLE, sensation grossly intact, alert and oriented x 4.     _______________________________________________________________________  Care Plan discussed with:    Comments   Patient x    Family  x Daughter    RN     Care Manager                    Consultant:      _______________________________________________________________________  Expected  Disposition:   Home with Family x   HH/PT/OT/RN    SNF/LTC    RAMOS    ________________________________________________________________________  TOTAL TIME:  54 Minutes    Critical Care Provided     Minutes non procedure based      Comments     Reviewed previous records   >50% of visit spent in counseling and coordination of care  Discussion with patient and/or family and questions answered       ________________________________________________________________________  Lavinia Jolly MD    Procedures: see electronic medical records for all procedures/Xrays and details which were not copied into this note but were reviewed prior to creation of Plan.     LAB DATA REVIEWED:    Recent Results (from the past 24 hour(s))   GLUCOSE, POC    Collection Time: 05/28/18  1:05 PM   Result Value Ref Range    Glucose (POC) 136 (H) 65 - 100 mg/dL    Performed by Gus Ruffin    CBC WITH AUTOMATED DIFF    Collection Time: 05/28/18  1:35 PM   Result Value Ref Range    WBC 19.1 (H) 3.6 - 11.0 K/uL    RBC 2.92 (L) 3.80 - 5.20 M/uL    HGB 7.4 (L) 11.5 - 16.0 g/dL    HCT 23.8 (L) 35.0 - 47.0 %    MCV 81.5 80.0 - 99.0 FL    MCH 25.3 (L) 26.0 - 34.0 PG    MCHC 31.1 30.0 - 36.5 g/dL    RDW 17.0 (H) 11.5 - 14.5 %    PLATELET 896 537 - 749 K/uL    MPV 12.5 8.9 - 12.9 FL    NRBC 0.0 0  WBC    ABSOLUTE NRBC 0.00 0.00 - 0.01 K/uL    NEUTROPHILS 86 (H) 32 - 75 %    LYMPHOCYTES 6 (L) 12 - 49 %    MONOCYTES 8 5 - 13 %    EOSINOPHILS 0 0 - 7 %    BASOPHILS 0 0 - 1 %    IMMATURE GRANULOCYTES 1 (H) 0.0 - 0.5 %    ABS. NEUTROPHILS 16.3 (H) 1.8 - 8.0 K/UL    ABS. LYMPHOCYTES 1.0 0.8 - 3.5 K/UL    ABS. MONOCYTES 1.5 (H) 0.0 - 1.0 K/UL    ABS. EOSINOPHILS 0.0 0.0 - 0.4 K/UL    ABS. BASOPHILS 0.0 0.0 - 0.1 K/UL    ABS. IMM. GRANS. 0.2 (H) 0.00 - 0.04 K/UL    DF AUTOMATED     METABOLIC PANEL, COMPREHENSIVE    Collection Time: 05/28/18  1:35 PM   Result Value Ref Range    Sodium 139 136 - 145 mmol/L    Potassium 4.2 3.5 - 5.1 mmol/L    Chloride 111 (H) 97 - 108 mmol/L    CO2 20 (L) 21 - 32 mmol/L    Anion gap 8 5 - 15 mmol/L    Glucose 131 (H) 65 - 100 mg/dL    BUN 61 (H) 6 - 20 MG/DL    Creatinine 3.27 (H) 0.55 - 1.02 MG/DL    BUN/Creatinine ratio 19 12 - 20      GFR est AA 17 (L) >60 ml/min/1.73m2    GFR est non-AA 14 (L) >60 ml/min/1.73m2    Calcium 7.8 (L) 8.5 - 10.1 MG/DL    Bilirubin, total 0.9 0.2 - 1.0 MG/DL    ALT (SGPT) 134 (H) 12 - 78 U/L    AST (SGOT) 81 (H) 15 - 37 U/L    Alk.  phosphatase 134 (H) 45 - 117 U/L    Protein, total 6.6 6.4 - 8.2 g/dL    Albumin 2.9 (L) 3.5 - 5.0 g/dL    Globulin 3.7 2.0 - 4.0 g/dL    A-G Ratio 0.8 (L) 1.1 - 2.2     NT-PRO BNP    Collection Time: 05/28/18  1:35 PM   Result Value Ref Range    NT pro-BNP 4172 (H) 0 - 125 PG/ML   CK W/ CKMB & INDEX    Collection Time: 05/28/18  1:35 PM   Result Value Ref Range    CK 81 26 - 192 U/L    CK - MB 2.4 <3.6 NG/ML    CK-MB Index 3.0 (H) 0 - 2.5     TROPONIN I    Collection Time: 05/28/18  1:35 PM   Result Value Ref Range    Troponin-I, Qt. 0.08 (H) <0.05 ng/mL LACTIC ACID    Collection Time: 05/28/18  3:34 PM   Result Value Ref Range    Lactic acid 0.7 0.4 - 2.0 MMOL/L

## 2018-05-28 NOTE — ED NOTES
Verbal shift change report given to [facility] (oncoming nurse) by Floresita Velasco (offgoing nurse). Report included the following information SBAR, ED Summary, Intake/Output, MAR and Recent Results. Pt on monitor x 3. Call bell in reach.

## 2018-05-28 NOTE — IP AVS SNAPSHOT
[address] [place] [address] 280 5 [facility] 
[phone] Patient: Steve Ngo MRN: [id number] [facility]:[id number] About your hospitalization You were admitted on:  May 28, 2018 You last received care in the:  [facility] You were discharged on:  May 30, 2018 Why you were hospitalized Your primary diagnosis was:  Not on File Your diagnoses also included:  Sepsis (Hcc) Follow-up Information Follow up With Details Comments Contact Shana Craig on 6/1/2018 Yasir Murphy will contact you 24hrs prior to the appointment to set up a transportation time  [address] [place] [address] 
[phone] Discharge Orders None A check hanna indicates which time of day the medication should be taken. My Medications START taking these medications Instructions Each Dose to Equal  
 Morning Noon Evening Bedtime  
 fluconazole 150 mg tablet Commonly known as:  DIFLUCAN Your last dose was: Your next dose is: Take 1 Tab by mouth daily for 14 days. FDA advises cautious prescribing of oral fluconazole in pregnancy. 150 mg  
    
   
   
   
  
 levoFLOXacin 750 mg tablet Commonly known as:  Nolan Gores Your last dose was: Your next dose is: Take 1 Tab by mouth daily for 5 days. 750 mg  
    
   
   
   
  
 predniSONE 20 mg tablet Commonly known as:  Xochitl Waterser Your last dose was: Your next dose is: Take 3 tabs by mouth twice a day for 3 days. Then take 2 tabs twice a day for 3 days, then take 1 tab twice a day for 3 days, then take 1 tab daily for 3 days. CONTINUE taking these medications Instructions Each Dose to Equal  
 Morning Noon Evening Bedtime  
 albuterol-ipratropium 2.5 mg-0.5 mg/3 ml Nebu Commonly known as:  Oddis Pillion Your last dose was: Your next dose is:    
   
   
 3 mL by Nebulization route. 3 mL  
    
   
   
   
  
 amLODIPine 5 mg tablet Commonly known as:  Leandra Webb Your last dose was: Your next dose is: Take 5 mg by mouth two (2) times a day. 5 mg  
    
   
   
   
  
 aspirin delayed-release 81 mg tablet Your last dose was: Your next dose is: Take 81 mg by mouth daily. 81 mg  
    
   
   
   
  
 atorvastatin 40 mg tablet Commonly known as:  LIPITOR Your last dose was: Your next dose is: Take 80 mg by mouth daily. 80 mg CLARITIN 10 mg tablet Generic drug:  loratadine Your last dose was: Your next dose is: Take 10 mg by mouth. 10 mg COREG 25 mg tablet Generic drug:  carvedilol Your last dose was: Your next dose is: Take 25 mg by mouth two (2) times daily (with meals). 25 mg  
    
   
   
   
  
 FLONASE ALLERGY RELIEF 50 mcg/actuation nasal spray Generic drug:  fluticasone Your last dose was: Your next dose is: 2 Sprays by Both Nostrils route daily. 2 Spray * HumaLOG U-100 Insulin 100 unit/mL injection Generic drug:  insulin lispro Your last dose was: Your next dose is:    
   
   
 60 Units by SubCUTAneous route Before breakfast, lunch, and dinner. 60 Units * HumaLOG U-100 Insulin 100 unit/mL injection Generic drug:  insulin lispro Your last dose was: Your next dose is:    
   
   
 by SubCUTAneous route. Sliding scale  
     
   
   
   
  
 hydrALAZINE 100 mg tablet Commonly known as:  APRESOLINE Your last dose was: Your next dose is: Take 100 mg by mouth three (3) times daily. 100 mg  
    
   
   
   
  
 LANTUS U-100 INSULIN 100 unit/mL injection Generic drug:  insulin glargine Your last dose was: Your next dose is:    
   
   
 35 Units by SubCUTAneous route nightly. 35 Units LASIX 40 mg tablet Generic drug:  furosemide Your last dose was: Your next dose is: Take 40 mg by mouth daily. 40 mg PAXIL 30 mg tablet Generic drug:  PARoxetine Your last dose was: Your next dose is: Take 60 mg by mouth daily. 60 mg  
    
   
   
   
  
 VITAMIN D2 50,000 unit capsule Generic drug:  ergocalciferol Your last dose was: Your next dose is: Take 50,000 Units by mouth every seven (7) days. 84419 Units * Notice: This list has 2 medication(s) that are the same as other medications prescribed for you. Read the directions carefully, and ask your doctor or other care provider to review them with you. Where to Get Your Medications Information on where to get these meds will be given to you by the nurse or doctor. ! Ask your nurse or doctor about these medications  
  fluconazole 150 mg tablet  
 levoFLOXacin 750 mg tablet  
 predniSONE 20 mg tablet Discharge Instructions None [facility] Announcement We are excited to announce that we are making your provider's discharge notes available to you in meebee. You will see these notes when they are completed and signed by the physician that discharged you from your recent hospital stay. If you have any questions or concerns about any information you see in meebee, please call the Health Information Department where you were seen or reach out to your Primary Care Provider for more information about your plan of care. Introducing [facility]! Cary Molina introduces meebee patient portal. Now you can access parts of your medical record, email your doctor's office, and request medication refills online. 1. In your internet browser, go to [email or web address] 2. Click on the First Time User? Click Here link in the Sign In box. You will see the New Member Sign Up page. 3. Enter your Pindrop Security Access Code exactly as it appears below. You will not need to use this code after youve completed the sign-up process. If you do not sign up before the expiration date, you must request a new code. · Pind[employer] Access Code: [email or web address] Expires: 8/26/2018 12:56 PM 
 
4. Enter the last four digits of your Social Security Number (xxxx) and Date of Birth (mm/dd/yyyy) as indicated and click Submit. You will be taken to the next sign-up page. 5. Create a Lineagent ID. This will be your Pindrop Security login ID and cannot be changed, so think of one that is secure and easy to remember. 6. Create a Pindrop [employer] password. You can change your password at any time. 7. Enter your Password Reset Question and Answer. This can be used at a later time if you forget your password. 8. Enter your e-mail address. You will receive e-mail notification when new information is available in [address] 9. Click Sign Up. You can now view and download portions of your medical record. 10. Click the Download Summary menu link to download a portable copy of your medical information. If you have questions, please visit the Frequently Asked Questions section of the [employer] website. Remember, [employer] is NOT to be used for urgent needs. For medical emergencies, dial 911. Now available from your iPhone and Android! Introducing Dharmesh Galeano As a [facility] patient, I wanted to make you aware of our electronic visit tool called Dharmesh Galeano. [facility] 24/7 allows you to connect within minutes with a medical provider 24 hours a day, seven days a week via a mobile device or tablet or logging into a secure website from your computer. You can access Dharmesh Galeano from anywhere in the [place]. A virtual visit might be right for you when you have a simple condition and feel like you just dont want to get out of bed, or cant get away from work for an appointment, when your regular [facility] [employer] provider is not available (evenings, weekends or holidays), or when youre out of town and need minor care. Electronic visits cost only $49 and if the [employer] 24/7 provider determines a prescription is needed to treat your condition, one can be electronically transmitted to a nearby pharmacy*. Please take a moment to enroll today if you have not already done so. The enrollment process is free and takes just a few minutes. To enroll, please download the [employer] 24/7 joan to your tablet or phone, or visit [email or web address] to enroll on your computer. And, as an [address] [place] [address] patient with a Daio account, the results of your visits will be scanned into your electronic medical record and your primary care provider will be able to view the scanned results. We urge you to continue to see your regular [facility] provider for your ongoing medical care. And while your primary care provider may not be the one available when you seek a MeterHero virtual visit, the peace of mind you get from getting a real diagnosis real time can be priceless. For more information on MeterHero, view our Frequently Asked Questions (FAQs) at [email or web address]. Sincerely, 
 
Niki Toure MD 
Chief Medical Officer Joey Linda Moore *:  certain medications cannot be prescribed via MeterHero Unresulted Labs-Please follow up with your PCP about these lab tests Order Current Status CULTURE, BLOOD, PAIRED Preliminary result CULTURE, RESPIRATORY/SPUTUM/BRONCH W GRAM STAIN Preliminary result Providers Seen During Your Hospitalization Provider Specialty Primary office phone Merary Hernandez MD Emergency Medicine [phone] Rg Rosales MD Internal Medicine [phone] Your Primary Care Physician (PCP) Primary Care Physician Office Phone Office Fax Kelsi Looney [phone] You are allergic to the following Allergen Reactions Codeine Hives Lisinopril Hives Recent Documentation Height Weight Breastfeeding? BMI Smoking Status 1.651 m 125.2 kg No 45.93 kg/m2 Never Smoker Emergency Contacts Name Discharge Info Relation Home Work Mobile [address] CAREGIVER [3] Child [2]   [phone] Patient Belongings The following personal items are in your possession at time of discharge: 
  Dental Appliances: At bedside, Uppers  Visual Aid: Glasses, At home      Home Medications: Kept at bedside   Jewelry: Ring (1)  Clothing: At bedside    Other Valuables: Cell Phone, Frannie Camp, With patient  Personal Items Sent to Safe: none (sent home with daughter) Please provide this summary of care documentation to your next provider. Signatures-by signing, you are acknowledging that this After Visit Summary has been reviewed with you and you have received a copy. Patient Signature:  ____________________________________________________________ Date:  ____________________________________________________________  
  
[facility] Provider Signature:  ____________________________________________________________ Date:  ____________________________________________________________

## 2018-05-28 NOTE — PROGRESS NOTES
Pharmacy Clarification of Prior to Admission Medication Regimen-Follow Up Needed    The patient was unable to participate in interview regarding clarification of the prior to admission medication regimen, due to AMS. Pharmacy attempted to clarify the prior to admission medication regimen with the patient's daughter, Assunta Spurling, [phone]. MHT called the patient's daughter and left a voicemail requesting PTA medication clarification. The patient's daughter has not called MHT back at this time. MHT is unable to clarify PTA with the patient's PCP on file due to the [facility] Day holiday. The medication history will need to be re-evaluated at a later time during admission when patient is willing/able to participate or if more information is provided.     Thank you,  Radha Rodriguez CPhT  Medication History Pharmacy Technician

## 2018-05-28 NOTE — ED NOTES
Bedside and Verbal shift change report given to [address] (oncoming nurse) by Salvatore Bacon RN (offgoing nurse). Report included the following information SBAR, ED Summary, MAR and Recent Results.

## 2018-05-28 NOTE — ED NOTES
TRANSFER - OUT REPORT:    Verbal report given to [facility] AT [facility] [place], RN on Hany Phelps  being transferred to [facility] for routine progression of care       Report consisted of patients Situation, Background, Assessment and   Recommendations(SBAR). Information from the following report(s) SBAR, Kardex, ED Summary, [facility] and Recent Results was reviewed with the receiving nurse. Lines:   Peripheral IV 05/28/18 Left Antecubital (Active)   Site Assessment Clean, dry, & intact 5/28/2018  1:34 PM   Phlebitis Assessment 0 5/28/2018  1:34 PM   Infiltration Assessment 0 5/28/2018  1:34 PM   Dressing Status Clean, dry, & intact 5/28/2018  1:34 PM   Dressing Type Transparent 5/28/2018  1:34 PM   Hub Color/Line Status Pink;Flushed 5/28/2018  1:34 PM   Action Taken Blood drawn 5/28/2018  1:34 PM        Opportunity for questions and clarification was provided.

## 2018-05-28 NOTE — ROUTINE PROCESS
Bedside and Verbal shift change report given to GUMARO Moreno (oncoming nurse) by Berna Galdamez RN, RN (offgoing nurse). Report included the following information SBAR, Kardex, Intake/Output, MAR and Recent Results.

## 2018-05-29 LAB
ANION GAP SERPL CALC-SCNC: 9 MMOL/L (ref 5–15)
ATRIAL RATE: 80 BPM
BUN SERPL-MCNC: 57 MG/DL (ref 6–20)
BUN/CREAT SERPL: 19 (ref 12–20)
CALCIUM SERPL-MCNC: 8 MG/DL (ref 8.5–10.1)
CALCULATED P AXIS, ECG09: 50 DEGREES
CALCULATED R AXIS, ECG10: 39 DEGREES
CALCULATED T AXIS, ECG11: 15 DEGREES
CHLORIDE SERPL-SCNC: 110 MMOL/L (ref 97–108)
CO2 SERPL-SCNC: 21 MMOL/L (ref 21–32)
CREAT SERPL-MCNC: 2.96 MG/DL (ref 0.55–1.02)
DIAGNOSIS, 93000: NORMAL
ERYTHROCYTE [DISTWIDTH] IN BLOOD BY AUTOMATED COUNT: 16.6 % (ref 11.5–14.5)
GLUCOSE BLD STRIP.AUTO-MCNC: 157 MG/DL (ref 65–100)
GLUCOSE BLD STRIP.AUTO-MCNC: 182 MG/DL (ref 65–100)
GLUCOSE BLD STRIP.AUTO-MCNC: 244 MG/DL (ref 65–100)
GLUCOSE BLD STRIP.AUTO-MCNC: 288 MG/DL (ref 65–100)
GLUCOSE BLD STRIP.AUTO-MCNC: 413 MG/DL (ref 65–100)
GLUCOSE SERPL-MCNC: 150 MG/DL (ref 65–100)
HCT VFR BLD AUTO: 23.5 % (ref 35–47)
HGB BLD-MCNC: 7.3 G/DL (ref 11.5–16)
MCH RBC QN AUTO: 25.2 PG (ref 26–34)
MCHC RBC AUTO-ENTMCNC: 31.1 G/DL (ref 30–36.5)
MCV RBC AUTO: 81 FL (ref 80–99)
NRBC # BLD: 0 K/UL (ref 0–0.01)
NRBC BLD-RTO: 0 PER 100 WBC
P-R INTERVAL, ECG05: 172 MS
PLATELET # BLD AUTO: 167 K/UL (ref 150–400)
PMV BLD AUTO: 12 FL (ref 8.9–12.9)
POTASSIUM SERPL-SCNC: 4.3 MMOL/L (ref 3.5–5.1)
Q-T INTERVAL, ECG07: 382 MS
QRS DURATION, ECG06: 80 MS
QTC CALCULATION (BEZET), ECG08: 440 MS
RBC # BLD AUTO: 2.9 M/UL (ref 3.8–5.2)
SERVICE CMNT-IMP: ABNORMAL
SODIUM SERPL-SCNC: 140 MMOL/L (ref 136–145)
VENTRICULAR RATE, ECG03: 80 BPM
WBC # BLD AUTO: 13.8 K/UL (ref 3.6–11)

## 2018-05-29 PROCEDURE — 74011000250 HC RX REV CODE- 250: Performed by: INTERNAL MEDICINE

## 2018-05-29 PROCEDURE — 80048 BASIC METABOLIC PNL TOTAL CA: CPT | Performed by: INTERNAL MEDICINE

## 2018-05-29 PROCEDURE — 65660000000 HC RM CCU STEPDOWN

## 2018-05-29 PROCEDURE — 85027 COMPLETE CBC AUTOMATED: CPT | Performed by: INTERNAL MEDICINE

## 2018-05-29 PROCEDURE — 74011636637 HC RX REV CODE- 636/637: Performed by: INTERNAL MEDICINE

## 2018-05-29 PROCEDURE — 74011250636 HC RX REV CODE- 250/636: Performed by: INTERNAL MEDICINE

## 2018-05-29 PROCEDURE — 94640 AIRWAY INHALATION TREATMENT: CPT

## 2018-05-29 PROCEDURE — 36415 COLL VENOUS BLD VENIPUNCTURE: CPT | Performed by: INTERNAL MEDICINE

## 2018-05-29 PROCEDURE — 77030033269 HC SLV COMPR SCD KNE2 CARD -B

## 2018-05-29 PROCEDURE — 74011250637 HC RX REV CODE- 250/637: Performed by: INTERNAL MEDICINE

## 2018-05-29 PROCEDURE — 82962 GLUCOSE BLOOD TEST: CPT

## 2018-05-29 RX ORDER — FAMOTIDINE 20 MG/1
20 TABLET, FILM COATED ORAL 2 TIMES DAILY
Status: DISCONTINUED | OUTPATIENT
Start: 2018-05-29 | End: 2018-05-29

## 2018-05-29 RX ORDER — FAMOTIDINE 20 MG/1
20 TABLET, FILM COATED ORAL DAILY
Status: DISCONTINUED | OUTPATIENT
Start: 2018-05-30 | End: 2018-05-30 | Stop reason: HOSPADM

## 2018-05-29 RX ORDER — LEVOFLOXACIN 5 MG/ML
500 INJECTION, SOLUTION INTRAVENOUS
Status: DISCONTINUED | OUTPATIENT
Start: 2018-05-30 | End: 2018-05-30

## 2018-05-29 RX ORDER — IPRATROPIUM BROMIDE AND ALBUTEROL SULFATE 2.5; .5 MG/3ML; MG/3ML
3 SOLUTION RESPIRATORY (INHALATION)
Status: DISCONTINUED | OUTPATIENT
Start: 2018-05-29 | End: 2018-05-30 | Stop reason: HOSPADM

## 2018-05-29 RX ORDER — IPRATROPIUM BROMIDE AND ALBUTEROL SULFATE 2.5; .5 MG/3ML; MG/3ML
3 SOLUTION RESPIRATORY (INHALATION)
Status: DISCONTINUED | OUTPATIENT
Start: 2018-05-30 | End: 2018-05-30 | Stop reason: HOSPADM

## 2018-05-29 RX ADMIN — ASPIRIN 81 MG: 81 TABLET, COATED ORAL at 08:53

## 2018-05-29 RX ADMIN — CARVEDILOL 25 MG: 12.5 TABLET, FILM COATED ORAL at 08:52

## 2018-05-29 RX ADMIN — IPRATROPIUM BROMIDE AND ALBUTEROL SULFATE 3 ML: .5; 3 SOLUTION RESPIRATORY (INHALATION) at 00:02

## 2018-05-29 RX ADMIN — IPRATROPIUM BROMIDE AND ALBUTEROL SULFATE 3 ML: .5; 3 SOLUTION RESPIRATORY (INHALATION) at 16:01

## 2018-05-29 RX ADMIN — INSULIN GLARGINE 16 UNITS: 100 INJECTION, SOLUTION SUBCUTANEOUS at 22:26

## 2018-05-29 RX ADMIN — HYDRALAZINE HYDROCHLORIDE 100 MG: 50 TABLET, FILM COATED ORAL at 17:43

## 2018-05-29 RX ADMIN — HYDRALAZINE HYDROCHLORIDE 100 MG: 50 TABLET, FILM COATED ORAL at 08:51

## 2018-05-29 RX ADMIN — INSULIN LISPRO 6 UNITS: 100 INJECTION, SOLUTION INTRAVENOUS; SUBCUTANEOUS at 22:25

## 2018-05-29 RX ADMIN — IPRATROPIUM BROMIDE AND ALBUTEROL SULFATE 3 ML: .5; 3 SOLUTION RESPIRATORY (INHALATION) at 03:40

## 2018-05-29 RX ADMIN — ESCITALOPRAM OXALATE 5 MG: 10 TABLET ORAL at 08:51

## 2018-05-29 RX ADMIN — Medication 10 ML: at 16:09

## 2018-05-29 RX ADMIN — INSULIN LISPRO 5 UNITS: 100 INJECTION, SOLUTION INTRAVENOUS; SUBCUTANEOUS at 17:43

## 2018-05-29 RX ADMIN — Medication 5 ML: at 22:25

## 2018-05-29 RX ADMIN — INSULIN LISPRO 3 UNITS: 100 INJECTION, SOLUTION INTRAVENOUS; SUBCUTANEOUS at 12:31

## 2018-05-29 RX ADMIN — ATORVASTATIN CALCIUM 80 MG: 40 TABLET, FILM COATED ORAL at 08:53

## 2018-05-29 RX ADMIN — AMLODIPINE BESYLATE 5 MG: 5 TABLET ORAL at 08:54

## 2018-05-29 RX ADMIN — GUAIFENESIN 600 MG: 600 TABLET, EXTENDED RELEASE ORAL at 22:25

## 2018-05-29 RX ADMIN — INSULIN LISPRO 2 UNITS: 100 INJECTION, SOLUTION INTRAVENOUS; SUBCUTANEOUS at 08:56

## 2018-05-29 RX ADMIN — Medication 10 ML: at 08:39

## 2018-05-29 RX ADMIN — PAROXETINE HYDROCHLORIDE 30 MG: 20 TABLET, FILM COATED ORAL at 08:52

## 2018-05-29 RX ADMIN — IPRATROPIUM BROMIDE AND ALBUTEROL SULFATE 3 ML: .5; 3 SOLUTION RESPIRATORY (INHALATION) at 07:08

## 2018-05-29 RX ADMIN — LIDOCAINE HYDROCHLORIDE 40 ML: 20 SOLUTION ORAL; TOPICAL at 17:44

## 2018-05-29 RX ADMIN — METHYLPREDNISOLONE SODIUM SUCCINATE 40 MG: 40 INJECTION, POWDER, FOR SOLUTION INTRAMUSCULAR; INTRAVENOUS at 12:31

## 2018-05-29 RX ADMIN — GUAIFENESIN 600 MG: 600 TABLET, EXTENDED RELEASE ORAL at 08:51

## 2018-05-29 RX ADMIN — IPRATROPIUM BROMIDE AND ALBUTEROL SULFATE 3 ML: .5; 3 SOLUTION RESPIRATORY (INHALATION) at 21:39

## 2018-05-29 RX ADMIN — LORATADINE 10 MG: 10 TABLET ORAL at 08:52

## 2018-05-29 RX ADMIN — AMLODIPINE BESYLATE 5 MG: 5 TABLET ORAL at 22:25

## 2018-05-29 RX ADMIN — HYDRALAZINE HYDROCHLORIDE 100 MG: 50 TABLET, FILM COATED ORAL at 22:24

## 2018-05-29 RX ADMIN — CARVEDILOL 25 MG: 12.5 TABLET, FILM COATED ORAL at 17:43

## 2018-05-29 RX ADMIN — IPRATROPIUM BROMIDE AND ALBUTEROL SULFATE 3 ML: .5; 3 SOLUTION RESPIRATORY (INHALATION) at 11:30

## 2018-05-29 RX ADMIN — FLUTICASONE PROPIONATE 2 SPRAY: 50 SPRAY, METERED NASAL at 09:02

## 2018-05-29 NOTE — PROGRESS NOTES
Pharmacy Automatic Renal Dosing Protocol - Antimicrobials    Indication for Antimicrobials: Sepsis due to HCAP & COPD exacerbagion    Current Regimen of Each Antimicrobial:  Vancomycin 3000mg IV loading, then 1750mg IV every 48hr (Start Date 18; Day # 2/7)  Levofloxacin 750mg IV every 48hr (start date: 18, Day 2)      Previous Antimicrobial Therapy:  Zosyn 3.375g IV x 1      Goal Level: VANCOMYCIN TROUGH GOAL RANGE    Vancomycin Trough: 15 - 20 mcg/mL    Date Dose & Interval Measured (mcg/mL) Extrapolated (mcg/mL)                       Significant Cultures:   : Blood = (pending)  : Respiratory = few WBCs; Yeast (pending)  : Nares = (pending)    Radiology / Imaging results: (X-ray, CT scan or MRI):   -: CXR: right lower lobe pneumonia and/or asymmetric pulmonary edema    Paralysis, amputations, malnutrition: n/a    Labs:  Recent Labs      18   0834  18   1335   CREA  2.96*  3.27*   BUN  57*  61*   WBC  13.8*  19.1*     Temp (24hrs), Av.4 °F (36.9 °C), Min:98.1 °F (36.7 °C), Max:98.6 °F (37 °C)    Creatinine Clearance (mL/min) or Dialysis: 18 ml/min    Impression/Plan:   · CrCl 18; change Levofloxacin to 500mg IV q48h  · Continue Vancomycin 1.75gm IV q48h; 1st maintenance dose tomorrow  · Daily BMP  · Antimicrobial stop date 7 days vanco, TBD for levofloxacin. Pharmacy will follow daily and adjust medications as appropriate for renal function and/or serum levels. Thank you,  Mildred Gregorio [facility]    Recommended duration of therapy  http://[email or web address]/[place]/[facility]/Pharmacy/Clinical%20Companion/[email or web address]%[email or web address]. docx    Renal Dosing  http://[facility]/[place]/[facility]/Pharmacy/Clinical%20Companion/Renal%[id number]. pdf

## 2018-05-29 NOTE — PROGRESS NOTES
Problem: Falls - Risk of  Goal: *Absence of Falls  Document Edmond Fall Risk and appropriate interventions in the flowsheet.    Outcome: Progressing Towards Goal  Fall Risk Interventions:  Mobility Interventions: Communicate number of staff needed for ambulation/transfer, OT consult for ADLs, Patient to call before getting OOB, PT Consult for mobility concerns, Strengthening exercises (ROM-active/passive), Utilize walker, cane, or other assitive device         Medication Interventions: Bed/chair exit alarm         History of Falls Interventions: Consult care management for discharge planning, Door open when patient unattended, Evaluate medications/consider consulting pharmacy, Room close to nurse's station, Utilize gait belt for transfer/ambulation, Investigate reason for fall, Bed/chair exit alarm

## 2018-05-29 NOTE — PROGRESS NOTES
Patient c/o itching to hands and feet bilaterally during vancomycin infusion. Vanco infusion stopped immediately. No rashes noted. Patient states hands and feet feel itchy. Dr. Juan Marin paged and informed of patient c/o. Order for benadryl given and to monitor patient.

## 2018-05-29 NOTE — PROGRESS NOTES
Reason for Admission:  Sepsis           RRAT Score:     8       Plan for utilizing home health:  As recommended              Likelihood of Readmission:  LOW as per RRAT score             Transition of Care Plan:       Pt is a 61 y/o -American female admitted for  Sepsis; Pt lives alone in a senior high rise with elevator access to her apartment. Pt is independent with ADLs not to include driving. Pt has PCA for 49 hours per week via Medicaid provider ([facility]). Pt has no previous SNF providers. Pt has access to cane for DME. Pt prefers to use [facility] pharmacy on [address]. Pt will be transported by family via private vehicle at discharge. CM met with pt to complete initial assessment. Pt is alert and oriented to person, place,time and situation. CM will continue to follow pt to assist with discharge plans as needed. Care Management Interventions  PCP Verified by CM: Yes Tip Cobb NP )  Mode of Transport at Discharge:  Other (see comment) (private vehicle )  Transition of Care Consult (CM Consult): Discharge Planning  Discharge Durable Medical Equipment: No  Health Maintenance Reviewed: Yes  Physical Therapy Consult: No  Occupational Therapy Consult: No  Speech Therapy Consult: No  Current Support Network: Lives Alone, Other ([place]ior [place] )  Confirm Follow Up Transport: Family  Plan discussed with Pt/Family/Caregiver: Yes  Discharge Location  Discharge Placement: Home with family assistance    RAHEL Anaya, [employer]   [phone]

## 2018-05-29 NOTE — PROGRESS NOTES
Hospitalist Progress Note      NAME: Edilberto Flores   :  1955  MRM:  [id number]    Date/Time: 2018  11:06 AM       Assessment / Plan:     Sepsis due to pneumonia and COPD exacerbation  RR >25, WBC 19, infiltrate on CXR, wheeze/rhonchi on exam.ABG - no significant CO2 retention  -given recent hospitalization will treat with broad antibiotics, vanc and levaquin  -standing nebs  -IV steroids  -follow cultures     Chronic diastolic heart failure  patient had negative stress test last week   -resume lasix      CKD stage 4  states when she left [facility] her Cr was 2.9, now 3.27  -follows with [facility] nephrologist     DM2  -will order lower dose lantus  -SSI     Anemia, likely chronic disease  -started on EPO 10 K weekly last week     Anxiety with panic attacks  -patient and daughter are concerned that paxil is not working, discussed and will start tapering off paxil, will trial escitalopram     Obesity  Body mass index is 45.93 kg/(m^2).     Code Status: full  Surrogate Decision Maker: daughter Maribel Risk [phone]     DVT Prophylaxis: SCD  Baseline: independent, recently discharged from [facility] last week         Subjective:     Chief Complaint:  C/o sob        ROS:  (bold if positive, if negative)      Tolerating PT  Tolerating Diet          Objective:       Vitals:          Last 24hrs VS reviewed since prior progress note.  Most recent are:    Visit Vitals    /67    Pulse 85    Temp 98.4 °F (36.9 °C)    Resp 20    Ht 5' 5\" (1.651 m)    Wt 125.2 kg (276 lb)    SpO2 100%    Breastfeeding No    BMI 45.93 kg/m2     SpO2 Readings from Last 6 Encounters:   18 100%          Intake/Output Summary (Last 24 hours) at 18 1106  Last data filed at 18 0715   Gross per 24 hour   Intake              900 ml   Output              475 ml   Net              425 ml          Exam:     Physical Exam:    Gen:  Well-developed, well-nourished, in no acute distress  HEENT:  Pink conjunctivae, PERRL, hearing intact to voice, moist mucous membranes  Neck:  Supple, without masses, thyroid non-tender  Resp:  No accessory muscle use, clear breath sounds without wheezes, rales or rhonchi  Card:  No murmurs, normal S1, S2 without thrills, bruits or peripheral edema  Abd:  Soft, non-tender, non-distended, normoactive bowel sounds are present  Musc:  No cyanosis, clubbing or edema  Skin:  No rashes or ulcers, skin turgor is good  Neuro:  Cranial nerves 3-12 are grossly intact, follows commands appropriately  Psych:  Good insight, oriented to person, place and time, alert       Telemetry reviewed:   normal sinus rhythm    Medications Reviewed: (see below)    Lab Data Reviewed: (see below)    ______________________________________________________________________    Medications:     Current Facility-Administered Medications   Medication Dose Route Frequency    levoFLOXacin (LEVAQUIN) 750 mg in D5W IVPB  750 mg IntraVENous Q48H    aspirin delayed-release tablet 81 mg  81 mg Oral DAILY    atorvastatin (LIPITOR) tablet 80 mg  80 mg Oral DAILY    loratadine (CLARITIN) tablet 10 mg  10 mg Oral DAILY    methylPREDNISolone (PF) (SOLU-MEDROL) injection 40 mg  40 mg IntraVENous Q12H    albuterol-ipratropium (DUO-NEB) 2.5 MG-0.5 MG/3 ML  3 mL Nebulization Q4H RT    guaiFENesin ER (MUCINEX) tablet 600 mg  600 mg Oral Q12H    sodium chloride (NS) flush 5-10 mL  5-10 mL IntraVENous Q8H    sodium chloride (NS) flush 5-10 mL  5-10 mL IntraVENous PRN    acetaminophen (TYLENOL) tablet 650 mg  650 mg Oral Q4H PRN    ondansetron (ZOFRAN) injection 4 mg  4 mg IntraVENous Q4H PRN    docusate sodium (COLACE) capsule 100 mg  100 mg Oral BID PRN    glucose chewable tablet 16 g  4 Tab Oral PRN    dextrose (D50W) injection syrg 12.5-25 g  12.5-25 g IntraVENous PRN    glucagon (GLUCAGEN) injection 1 mg  1 mg IntraMUSCular PRN    insulin lispro (HUMALOG) injection   SubCUTAneous AC&HS    insulin glargine (LANTUS) injection 16 Units  16 Units SubCUTAneous QHS    [START ON 5/30/2018] vancomycin (VANCOCIN) 1750 mg in  ml infusion  1,750 mg IntraVENous Q48H    amLODIPine (NORVASC) tablet 5 mg  5 mg Oral BID    carvedilol (COREG) tablet 25 mg  25 mg Oral BID WITH MEALS    hydrALAZINE (APRESOLINE) tablet 100 mg  100 mg Oral TID    fluticasone (FLONASE) 50 mcg/actuation nasal spray 2 Spray  2 Spray Both Nostrils DAILY    PARoxetine (PAXIL) tablet 30 mg  30 mg Oral DAILY    escitalopram oxalate (LEXAPRO) tablet 5 mg  5 mg Oral DAILY    diphenhydrAMINE (BENADRYL) injection 12.5 mg  12.5 mg IntraVENous Q6H PRN            Lab Review:     Recent Labs      05/29/18   0834  05/28/18   1335   WBC  13.8*  19.1*   HGB  7.3*  7.4*   HCT  23.5*  23.8*   PLT  167  171     Recent Labs      05/29/18   0834  05/28/18   1335   NA  140  139   K  4.3  4.2   CL  110*  111*   CO2  21  20*   GLU  150*  131*   BUN  57*  61*   CREA  2.96*  3.27*   CA  8.0*  7.8*   ALB   --   2.9*   TBILI   --   0.9   SGOT   --   81*   ALT   --   134*     Lab Results   Component Value Date/Time    Glucose (POC) 157 (H) 05/29/2018 07:26 AM    Glucose (POC) 182 (H) 05/29/2018 04:34 AM    Glucose (POC) 136 (H) 05/28/2018 09:04 PM    Glucose (POC) 131 (H) 05/28/2018 06:01 PM    Glucose (POC) 136 (H) 05/28/2018 01:05 PM         Total time spent with patient: 30 Minutes                  Care Plan discussed with: Patient    Code Status: Full    Prophylaxis:  Lovenox    Disposition:  Home w/Family           ___________________________________________________    Attending Physician: Irene Hlul MD

## 2018-05-29 NOTE — PROGRESS NOTES
Bedside shift change report given to Jose Leong 43 (oncoming nurse) by Laurie Vasquez RN (offgoing nurse). Report included the following information SBAR, Kardex, ED Summary, Intake/Output, MAR, Recent Results and Cardiac Rhythm NSR.

## 2018-05-29 NOTE — PROGRESS NOTES
ADULT PROTOCOL: JET AEROSOL ASSESSMENT    Patient  Jm Covarrubias     60 y.o.   female     5/28/2018  10:36 PM    Breath Sounds Pre Procedure: Right Breath Sounds: Coarse (slightly)                               Left Breath Sounds: Coarse (slightly)    Breath Sounds Post Procedure: Right Breath Sounds: Coarse                                 Left Breath Sounds: Coarse    Breathing pattern: Pre procedure Breathing Pattern: Regular          Post procedure Breathing Pattern: Regular    Heart Rate: Pre procedure Pulse: 78           Post procedure Pulse: 77    Resp Rate: Pre procedure Respirations: 20           Post procedure Respirations: 20    Peak Flow: Pre bronchodilator             Post bronchodilator       FVC/FEV1:  N/A    Incentive Spirometry:             Cough: Pre procedure Cough: Non-productive               Post procedure Cough: Non-productive, Congested    Suctioned: NO    Sputum: Pre procedure                   Post procedure      Oxygen: O2 Device: Room air   Room air     Changed: NO    SpO2: Pre procedure SpO2: 97 %   without oxygen              Post procedure    without oxygen    Nebulizer Therapy: Current medications Aerosolized Medications: DuoNeb      Changed: NO    Smoking History:    Smoking status: Never Smoker    Smokeless tobacco: Never Used         Problem List:   Patient Active Problem List   Diagnosis Code    Sepsis ([facility] A41.9       Respiratory Therapist: RT Sana

## 2018-05-29 NOTE — INTERDISCIPLINARY ROUNDS
Bedside interdisciplinary rounds were held today to discuss patient plan of care and outcomes. The following members were present: Physician, Nurse, Clinical Care Leader, Pharmacy, Physical Therapy, and Case Management. Plan:  PT/OT ordered. Antibiotics. Solumedrol.   SCDs

## 2018-05-29 NOTE — ROUTINE PROCESS
Bedside and Verbal shift change report given to [address] (oncoming nurse) by Bell Julio RN (offgoing nurse). Report included the following information SBAR, Kardex, Intake/Output, MAR, Recent Results and Cardiac Rhythm NSR   .

## 2018-05-29 NOTE — CDMP QUERY
Account Number: [de-identified]  MRN: [id number]  Patient: Loly Washington  Created: [id number]  Clinician Name: Eusebio Rosario RN, CCDS   Dr. Elian Watters :  Please clarify if this patient is (was) being treated/managed for:     => Acute respiratory failure with hypoxia POA in the setting of sepsis &copd exacerbation   => Other explanation of clinical findings  => Clinically Undetermined (no explanation for clinical findings)    The medical record reflects the following clinical findings, treatment, and risk factors. Risk Factors:  anxiety w/ panic attacks, anemia, sepsis, COPD exacerbation, CHF  Clinical Indicators:  SOB, wheezing, COOPER, tachypnea RR 23- 26, ABG on RA PO2 57, 88%  O2 sats. Treatment: IV solumedrol, O2 support, duonebs. Please clarify and document your clinical opinion in the progress notes and discharge summary including the definitive and/or presumptive diagnosis, (suspected or probable), related to the above clinical findings. Please include clinical findings supporting your diagnosis.   Thank Barby Silva RN, CCDS

## 2018-05-30 VITALS
TEMPERATURE: 97.7 F | BODY MASS INDEX: 45.98 KG/M2 | DIASTOLIC BLOOD PRESSURE: 52 MMHG | RESPIRATION RATE: 18 BRPM | HEIGHT: 65 IN | HEART RATE: 77 BPM | WEIGHT: 276 LBS | OXYGEN SATURATION: 97 % | SYSTOLIC BLOOD PRESSURE: 119 MMHG

## 2018-05-30 LAB
ANION GAP SERPL CALC-SCNC: 7 MMOL/L (ref 5–15)
BACTERIA SPEC CULT: NORMAL
BACTERIA SPEC CULT: NORMAL
BUN SERPL-MCNC: 10 MG/DL (ref 6–20)
BUN/CREAT SERPL: 14 (ref 12–20)
CALCIUM SERPL-MCNC: 9.4 MG/DL (ref 8.5–10.1)
CHLORIDE SERPL-SCNC: 104 MMOL/L (ref 97–108)
CO2 SERPL-SCNC: 28 MMOL/L (ref 21–32)
CREAT SERPL-MCNC: 0.73 MG/DL (ref 0.55–1.02)
GLUCOSE BLD STRIP.AUTO-MCNC: 305 MG/DL (ref 65–100)
GLUCOSE BLD STRIP.AUTO-MCNC: 364 MG/DL (ref 65–100)
GLUCOSE BLD STRIP.AUTO-MCNC: 388 MG/DL (ref 65–100)
GLUCOSE SERPL-MCNC: 124 MG/DL (ref 65–100)
POTASSIUM SERPL-SCNC: 3.4 MMOL/L (ref 3.5–5.1)
SERVICE CMNT-IMP: ABNORMAL
SERVICE CMNT-IMP: NORMAL
SODIUM SERPL-SCNC: 139 MMOL/L (ref 136–145)

## 2018-05-30 PROCEDURE — 74011636637 HC RX REV CODE- 636/637: Performed by: INTERNAL MEDICINE

## 2018-05-30 PROCEDURE — 82962 GLUCOSE BLOOD TEST: CPT

## 2018-05-30 PROCEDURE — 36415 COLL VENOUS BLD VENIPUNCTURE: CPT | Performed by: INTERNAL MEDICINE

## 2018-05-30 PROCEDURE — 74011250636 HC RX REV CODE- 250/636: Performed by: INTERNAL MEDICINE

## 2018-05-30 PROCEDURE — G8979 MOBILITY GOAL STATUS: HCPCS | Performed by: PHYSICAL THERAPIST

## 2018-05-30 PROCEDURE — 97165 OT EVAL LOW COMPLEX 30 MIN: CPT | Performed by: OCCUPATIONAL THERAPIST

## 2018-05-30 PROCEDURE — G8978 MOBILITY CURRENT STATUS: HCPCS | Performed by: PHYSICAL THERAPIST

## 2018-05-30 PROCEDURE — 74011000250 HC RX REV CODE- 250: Performed by: HOSPITALIST

## 2018-05-30 PROCEDURE — 97535 SELF CARE MNGMENT TRAINING: CPT | Performed by: OCCUPATIONAL THERAPIST

## 2018-05-30 PROCEDURE — 74011250637 HC RX REV CODE- 250/637: Performed by: INTERNAL MEDICINE

## 2018-05-30 PROCEDURE — 97161 PT EVAL LOW COMPLEX 20 MIN: CPT | Performed by: PHYSICAL THERAPIST

## 2018-05-30 PROCEDURE — 80048 BASIC METABOLIC PNL TOTAL CA: CPT | Performed by: INTERNAL MEDICINE

## 2018-05-30 PROCEDURE — 94640 AIRWAY INHALATION TREATMENT: CPT

## 2018-05-30 PROCEDURE — G8988 SELF CARE GOAL STATUS: HCPCS | Performed by: OCCUPATIONAL THERAPIST

## 2018-05-30 PROCEDURE — G8987 SELF CARE CURRENT STATUS: HCPCS | Performed by: OCCUPATIONAL THERAPIST

## 2018-05-30 RX ORDER — FLUCONAZOLE 150 MG/1
150 TABLET ORAL DAILY
Qty: 14 TAB | Refills: 0 | Status: SHIPPED | OUTPATIENT
Start: 2018-05-30 | End: 2018-06-13

## 2018-05-30 RX ORDER — LEVOFLOXACIN 750 MG/1
750 TABLET ORAL DAILY
Qty: 5 TAB | Refills: 0 | Status: SHIPPED | OUTPATIENT
Start: 2018-05-30 | End: 2018-06-04

## 2018-05-30 RX ORDER — VANCOMYCIN/0.9 % SOD CHLORIDE 1.5G/250ML
1500 PLASTIC BAG, INJECTION (ML) INTRAVENOUS EVERY 8 HOURS
Status: DISCONTINUED | OUTPATIENT
Start: 2018-05-30 | End: 2018-05-30 | Stop reason: HOSPADM

## 2018-05-30 RX ORDER — PREDNISONE 20 MG/1
TABLET ORAL
Qty: 40 TAB | Refills: 0 | Status: SHIPPED | OUTPATIENT
Start: 2018-05-30 | End: 2018-10-17

## 2018-05-30 RX ORDER — LEVOFLOXACIN 5 MG/ML
750 INJECTION, SOLUTION INTRAVENOUS EVERY 24 HOURS
Status: DISCONTINUED | OUTPATIENT
Start: 2018-05-30 | End: 2018-05-30 | Stop reason: HOSPADM

## 2018-05-30 RX ADMIN — INSULIN LISPRO 7 UNITS: 100 INJECTION, SOLUTION INTRAVENOUS; SUBCUTANEOUS at 07:30

## 2018-05-30 RX ADMIN — Medication 5 ML: at 05:25

## 2018-05-30 RX ADMIN — IPRATROPIUM BROMIDE AND ALBUTEROL SULFATE 3 ML: .5; 3 SOLUTION RESPIRATORY (INHALATION) at 13:43

## 2018-05-30 RX ADMIN — AMLODIPINE BESYLATE 5 MG: 5 TABLET ORAL at 09:29

## 2018-05-30 RX ADMIN — Medication 10 ML: at 11:16

## 2018-05-30 RX ADMIN — LORATADINE 10 MG: 10 TABLET ORAL at 09:30

## 2018-05-30 RX ADMIN — FAMOTIDINE 20 MG: 20 TABLET ORAL at 09:30

## 2018-05-30 RX ADMIN — INSULIN LISPRO 9 UNITS: 100 INJECTION, SOLUTION INTRAVENOUS; SUBCUTANEOUS at 11:16

## 2018-05-30 RX ADMIN — ATORVASTATIN CALCIUM 80 MG: 40 TABLET, FILM COATED ORAL at 09:29

## 2018-05-30 RX ADMIN — CARVEDILOL 25 MG: 12.5 TABLET, FILM COATED ORAL at 09:30

## 2018-05-30 RX ADMIN — HYDRALAZINE HYDROCHLORIDE 100 MG: 50 TABLET, FILM COATED ORAL at 09:30

## 2018-05-30 RX ADMIN — ESCITALOPRAM OXALATE 5 MG: 10 TABLET ORAL at 09:30

## 2018-05-30 RX ADMIN — METHYLPREDNISOLONE SODIUM SUCCINATE 40 MG: 40 INJECTION, POWDER, FOR SOLUTION INTRAMUSCULAR; INTRAVENOUS at 11:15

## 2018-05-30 RX ADMIN — FLUTICASONE PROPIONATE 2 SPRAY: 50 SPRAY, METERED NASAL at 09:31

## 2018-05-30 RX ADMIN — ASPIRIN 81 MG: 81 TABLET, COATED ORAL at 09:30

## 2018-05-30 RX ADMIN — PAROXETINE HYDROCHLORIDE 30 MG: 20 TABLET, FILM COATED ORAL at 09:30

## 2018-05-30 RX ADMIN — IPRATROPIUM BROMIDE AND ALBUTEROL SULFATE 3 ML: .5; 3 SOLUTION RESPIRATORY (INHALATION) at 07:22

## 2018-05-30 RX ADMIN — GUAIFENESIN 600 MG: 600 TABLET, EXTENDED RELEASE ORAL at 09:30

## 2018-05-30 RX ADMIN — METHYLPREDNISOLONE SODIUM SUCCINATE 40 MG: 40 INJECTION, POWDER, FOR SOLUTION INTRAMUSCULAR; INTRAVENOUS at 00:27

## 2018-05-30 NOTE — PROGRESS NOTES
Problem: Mobility Impaired (Adult and Pediatric)  Goal: *Acute Goals and Plan of Care (Insert Text)  Physical Therapy Goals  Initiated 5/30/2018    1. Patient will transfer from bed to chair and chair to bed with independence using the least restrictive device within 7 day(s). 2.  Patient will perform sit to stand with independence within 7 day(s). 3.  Patient will ambulate with modified independence for 200 feet with the least restrictive device within 7 day(s). physical Therapy EVALUATION  Seen 1313 to 1329    Patient: Julio Cesar Aguirre (52 y.o. female)  Date: 5/30/2018  Primary Diagnosis: Sepsis ([facility]  Precautions: Respiratory    ASSESSMENT :  Based on the objective data described below, the patient presents sitting up in bedside chair upon arrival eating lunch. She was willing to work with PT. Moves all 4 limbs fairly well antigravity. Stood with supervision. Ambulated into the bathroom. Able to get on/off the toilet and stand at the sink and wash her hands. Then ambulated 48' with RW and CGA. Needed several standing rest breaks. Safety cues at thresholds and on turns. Up in chair at end of session to finish her lunch. Would benefit from HHPT at discharge. Patient will benefit from skilled intervention to address the above impairments.   Patients rehabilitation potential is considered to be Good  Factors which may influence rehabilitation potential include:   []         None noted  []         Mental ability/status  []         Medical condition  [x]         Home/family situation and support systems  []         Safety awareness  []         Pain tolerance/management  []         Other:      PLAN :  Recommendations and Planned Interventions:  [x]           Bed Mobility Training             []    Neuromuscular Re-Education  [x]           Transfer Training                   []    Orthotic/Prosthetic Training  [x]           Gait Training                         []    Modalities  [x] Therapeutic Exercises           []    Edema Management/Control  []           Therapeutic Activities            [x]    Patient and Family Training/Education  []           Other (comment):    Frequency/Duration: Patient will be followed by physical therapy  3 times a week to address goals. Discharge Recommendations: Home Health  Further Equipment Recommendations for Discharge: transfer bench and rollator     SUBJECTIVE:   Patient stated I start to get anxious when I can't breathe.     OBJECTIVE DATA SUMMARY:   HISTORY:    Past Medical History:   Diagnosis Date    CKD (chronic kidney disease) stage 4, GFR 15-29 ml/min (HCC)     COPD (chronic obstructive pulmonary disease) (HCC)     Diabetes (HCC)     Diastolic heart failure (HCC)     Hypertension     Pulmonary HTN (HCC)      Past Surgical History:   Procedure Laterality Date    HX CHOLECYSTECTOMY      HX GYN          HX HEENT      tonsillectomy     Prior Level of Function/Home Situation: Ambulating  Personal factors and/or comorbidities impacting plan of care: SOB, panic attacks    Home Situation  Home Environment: Apartment  # Steps to Enter: 0  One/Two Story Residence: One story  Living Alone: Yes  Support Systems: Family member(s)  Patient Expects to be Discharged to[de-identified] Apartment  Current DME Used/Available at Home: Lanelle [place], rollator, Shower chair, [place], straight, [place], quad, Grab bars (see and eye cane, SPC (see and eye); sock aid)  Tub or Shower Type: Tub/Shower combination    EXAMINATION/PRESENTATION/DECISION MAKING:   Critical Behavior:  Neurologic State: Alert  Orientation Level: Oriented X4  Cognition: Appropriate decision making, Appropriate for age attention/concentration, Appropriate safety awareness  Safety/Judgement: Awareness of environment, Fall prevention, Home safety     Hearing:   Auditory  Auditory Impairment: None     Range Of Motion:  AROM: Generally decreased, functional     Strength:    Strength: Generally decreased, functional     Tone & Sensation:   Tone: Normal  Sensation: Intact    Coordination:  Coordination: Within functional limits     Vision:   Acuity:  (legally blind; can read large print no detail)  Functional Mobility:  Bed Mobility:  Rolling:  (seated at bedside chair upon arrival)  Scooting: Supervision     Transfers:  Sit to Stand: Supervision; Additional time  Stand to Sit: Supervision; Additional time  Bed to Chair: Supervision; Additional time (with rolling walker)        Balance:   Sitting: Intact  Standing: Intact; With support    Ambulation/Gait Training:  Distance (ft): 50 Feet (ft) (with standing rest break correction)  Assistive Device: Gait belt;Walker, rolling  Ambulation - Level of Assistance: Stand-by assistance    Functional Measure:    Elder Mobility Scale    13/20       EMS and G-code impairment scale:  Percentage of impairment CH  0% CI  1-19% CJ  20-39% CK  40-59% CL  60-79% CM  80-99% CN  100%   EMS Score 0-20 20 17-19 13-16 9-12 5-8 1-4 0      Scores under 10  generally these patients are dependent in mobility maneuvers; require help with  basic ADL, such as transfers, toileting and dressing. Scores between 10  13  generally these patients are borderline in terms of safe mobility and  independence in ADL i.e. they require some help with some mobility maneuvers. Scores over 14  Generally these patients are able to perform mobility maneuvers alone and safely  and are independent in basic ADL. G codes: In compliance with CMSs Claims Based Outcome Reporting, the following G-code set was chosen for this patient based on their primary functional limitation being treated: The outcome measure chosen to determine the severity of the functional limitation was the Elderly Mobility Scale Score with a score of 13/20 which was correlated with the impairment scale.     ? Mobility - Walking and Moving Around:     - CURRENT STATUS: CJ - 20%-39% impaired, limited or restricted    - GOAL STATUS: CI - 1%-19% impaired, limited or restricted    - D/C STATUS:  ---------------To be determined---------------      Pain:  Pain Scale 1: Numeric (0 - 10)  Pain Intensity 1: 0     Activity Tolerance: Tolerated PT evaluation/treatment session well. Please refer to the flowsheet for vital signs taken during this treatment. After treatment:   [x]         Patient left in no apparent distress sitting up in chair  []         Patient left in no apparent distress in bed  [x]         Call bell left within reach  [x]         Nursing notified  []         Caregiver present  []         Bed alarm activated (not being used)    COMMUNICATION/EDUCATION:   The patients plan of care was discussed with: Occupational Therapist and Registered Nurse. [x]         Fall prevention education was provided and the patient/caregiver indicated understanding. []         Patient/family have participated as able in goal setting and plan of care. [x]         Patient/family agree to work toward stated goals and plan of care. []         Patient understands intent and goals of therapy, but is neutral about his/her participation. []         Patient is unable to participate in goal setting and plan of care.     Thank you for this referral.  Fannie Newsome, PT  Time Calculation: 16 mins

## 2018-05-30 NOTE — ROUTINE PROCESS
PCP DAMON appt scheduled with Dr. Rahel Skaggs on 6/1/18. Karina Wylie will contact the pt 24hrs prior to the apt to schedule a transportation time.   Appt added to AVS. Kal Vela CM Specialist

## 2018-05-30 NOTE — DISCHARGE SUMMARY
Physician Discharge Summary     Patient ID:  Cammy Miles  [id number]  36 y.o.  1955    Admit date: 5/28/2018    Discharge date and time: 5/30/2018    Admission Diagnoses: Sepsis [facility])    Discharge Diagnoses: Active Problems:    Sepsis ([address] (5/28/2018)           Hospital Course:   60 y.o. female with COPD, DM, HTN, CKD who presents with worsening SOB and wheezing. Patient was discharged from [facility] last week for COPD exacerbation, she is currently on a prednisone taper. She was feeling well until suddenly last night she began having shortness of breath and productive cough. She also was diagnosed NSTEMI and cardiomyopathy and had a stress test which was negative at that time. She denies any fevers or chills.     Sepsis due to pneumonia and COPD exacerbation  RR >25, WBC 19, infiltrate on CXR, wheeze/rhonchi on exam.ABG - no significant CO2 retention  -given recent hospitalization will treat with broad antibiotics, levaquin  -standing nebs  - cont steroids  -follow cultures      Chronic diastolic heart failure  patient had negative stress test last week   -resume lasix       CKD stage 4  [facility] 2.9, now back to baseline  -follows with [facility] nephrologist    Pt remained stable for discharge home on steroids to follow up with PCP and Pulm in 2wks        PCP: Kaitlynn Freeman NP       Condition of patient at discharge: stable    Discharge Exam:  General:   WD, WN. Alert, cooperative, no acute distress    EENT:      EOMI. Anicteric sclerae. MMM  Resp:       CTA bilaterally, no wheezing or rales. No accessory muscle use  CV:           Regular  rhythm,  No edema  GI:            Soft, Non distended, Non tender.  +Bowel sounds  Neurologic: Alert and oriented X 3, normal speech,   Psych:       Good insight. Not anxious nor agitated  Skin:          No rashes.   No jaundice    Disposition: home    Patient Instructions:   Current Discharge Medication List START taking these medications    Details   predniSONE (DELTASONE) 20 mg tablet Take 3 tabs by mouth twice a day for 3 days. Then take 2 tabs twice a day for 3 days, then take 1 tab twice a day for 3 days, then take 1 tab daily for 3 days. Qty: 40 Tab, Refills: 0      levoFLOXacin (LEVAQUIN) 750 mg tablet Take 1 Tab by mouth daily for 5 days. Qty: 5 Tab, Refills: 0         CONTINUE these medications which have NOT CHANGED    Details   atorvastatin (LIPITOR) 40 mg tablet Take 80 mg by mouth daily. insulin glargine (LANTUS U-100 INSULIN) 100 unit/mL injection 35 Units by SubCUTAneous route nightly. !! insulin lispro (HUMALOG U-100 INSULIN) 100 unit/mL injection 60 Units by SubCUTAneous route Before breakfast, lunch, and dinner. !! insulin lispro (HUMALOG U-100 INSULIN) 100 unit/mL injection by SubCUTAneous route. Sliding scale      aspirin delayed-release 81 mg tablet Take 81 mg by mouth daily. loratadine (CLARITIN) 10 mg tablet Take 10 mg by mouth. albuterol-ipratropium (DUO-NEB) 2.5 mg-0.5 mg/3 ml nebu 3 mL by Nebulization route. furosemide (LASIX) 40 mg tablet Take 40 mg by mouth daily. ergocalciferol (VITAMIN D2) 50,000 unit capsule Take 50,000 Units by mouth every seven (7) days. PARoxetine (PAXIL) 30 mg tablet Take 60 mg by mouth daily. carvedilol (COREG) 25 mg tablet Take 25 mg by mouth two (2) times daily (with meals). hydrALAZINE (APRESOLINE) 100 mg tablet Take 100 mg by mouth three (3) times daily. fluticasone (FLONASE ALLERGY RELIEF) 50 mcg/actuation nasal spray 2 Sprays by Both Nostrils route daily. amLODIPine (NORVASC) 5 mg tablet Take 5 mg by mouth two (2) times a day. !! - Potential duplicate medications found. Please discuss with provider.         Activity: Activity as tolerated  Diet: Diabetic Diet    Follow-up with Beckie Owen NP in 1-2weeks  Follow-up tests/labs bmp    Approximate time spent in patient care on day of discharge: Greater than 30mins     Signed:  Spenser Fisher MD  5/30/2018  1:41 PM

## 2018-05-30 NOTE — PROGRESS NOTES
Pt will discharge home with no further discharge needs. Pt will be transported by family via private vehicle. Care Management Interventions  PCP Verified by CM: Yes Tip Cobb NP )  Mode of Transport at Discharge:  Other (see comment) (private vehicle )  Transition of Care Consult (CM Consult): Discharge Planning  Discharge Durable Medical Equipment: No  Health Maintenance Reviewed: Yes  Physical Therapy Consult: No  Occupational Therapy Consult: No  Speech Therapy Consult: No  Current Support Network: Lives Alone, Other ([place] )  Confirm Follow Up Transport: Family  Plan discussed with Pt/Family/Caregiver: Yes  Discharge Location  Discharge Placement: Home with family assistance    RAHEL Anaya, [employer]   [phone]

## 2018-05-30 NOTE — PROGRESS NOTES
1840: Patient wheeled downstairs with family by [facility], PCT    1630: Patient discharged but is awaiting ride home.

## 2018-05-30 NOTE — PROGRESS NOTES
Problem: Self Care Deficits Care Plan (Adult)  Goal: *Acute Goals and Plan of Care (Insert Text)  Occupational Therapy Goals:  Initiated 5/30/2018  1. Patient will perform grooming standing with modified independence within 7 days. 2. Patient will perform toileting with modified independence within 7 days. 3. Patient will perform lower body dressing with modified independence within 7 days. 4. Patient will perform one light IADL task in standing with modified independence within 7 days. 5. Patient will transfer from toilet with modified independence using the least restrictive device and appropriate durable medical equipment within 7 days. Occupational Therapy EVALUATION  Patient: Tita Cisneros (51 y.o. female)  Date: 5/30/2018  Primary Diagnosis: Sepsis ([facility] [address]        Precautions: none       ASSESSMENT :  Based on the objective data described below, the patient presents with low vision but is functional during ADLs. Pt reports having a aid 49 hours a 7 days a week for IADLS and assist with ADLS as needed. Pt reports recent new dx of COPD and reports that she is anxious and thinks this may also be the cause of her SOB. Pt was on room air this session and had feeling of SOB needing two standing rest breaks. Pt reports that she does feel anxious when she moves and was educated on deep breathing technique and on pacing. Pt is performing mobility with rolling walker with supervision overall. Pt is performing standing ADLs at a supervision to SBA level. Pt has adaptive aids for lower body dressing and is performing lower body ADLs at a moderate assist level due to decreased functional reach to LE. Recommend return to home with home mehdi services and continuation of aide assist.    Patient will benefit from skilled intervention to address the above impairments.   Patients rehabilitation potential is considered to be Good  Factors which may influence rehabilitation potential include:   [x] None noted  []             Mental ability/status  []             Medical condition  []             Home/family situation and support systems  []             Safety awareness  []             Pain tolerance/management  []             Other:      PLAN :  Recommendations and Planned Interventions:  [x]               Self Care Training                  [x]        Therapeutic Activities  [x]               Functional Mobility Training    []        Cognitive Retraining  [x]               Therapeutic Exercises           []        Endurance Activities  [x]               Balance Training                   []        Neuromuscular Re-Education  []               Visual/Perceptual Training     [x]   Home Safety Training  [x]               Patient Education                 [x]        Family Training/Education  []               Other (comment):    Frequency/Duration: Patient will be followed by occupational therapy 3 times a week to address goals. Discharge Recommendations: Home Health with resumption of aide services  Further Equipment Recommendations for Discharge: none     SUBJECTIVE:   [facility].     OBJECTIVE DATA SUMMARY:   HISTORY:   Past Medical History:   Diagnosis Date    CKD (chronic kidney disease) stage 4, GFR 15-29 ml/min (HCC)     COPD (chronic obstructive pulmonary disease) (HCC)     Diabetes (HCC)     Diastolic heart failure (HCC)     Hypertension     Pulmonary HTN (HCC)      Past Surgical History:   Procedure Laterality Date    HX CHOLECYSTECTOMY      HX GYN          HX HEENT      tonsillectomy       Prior Level of Function/Environment/Context: ambulated with rollator walker or SPC; uses SPC in home and touches objects, low vision; has a aide to assist with IADLS, bathing and sometimes dressing  Reports occasionally having difficulty with directionality of clothing or vs. Inside/outside direction of clothing due to decreased vision; gets help with cutting food at times due to decreased vision; caregiver cooks for pt   Expanded or extensive additional review of patient history:     Home Situation  Home Environment: Apartment  # Steps to Enter: 0  One/Two Story Residence: One story  Living Alone: Yes  Support Systems: Family member(s)  Patient Expects to be Discharged to[de-identified] Apartment  Current DME Used/Available at Home: Yuliana Fernandez, rollator, Shower chair, [address], straight, [address], quad, Grab bars (see and eye cane, SPC (see and eye); sock aid)  Tub or Shower Type: Tub/Shower combination  [x]  Right hand dominant   []  Left hand dominant    EXAMINATION OF PERFORMANCE DEFICITS:  Cognitive/Behavioral Status:  Neurologic State: Alert  Orientation Level: Oriented X4  Cognition: Appropriate decision making; Appropriate for age attention/concentration; Appropriate safety awareness  Perception: Appears intact  Perseveration: No perseveration noted  Safety/Judgement: Awareness of environment; Fall prevention;Home safety    Hearing: Auditory  Auditory Impairment: None    Vision/Perceptual:                           Acuity:  (legally blind; can read large print no detail)         Range of Motion:    AROM: Generally decreased, functional                         Strength:    Strength: Generally decreased, functional                Coordination:  Coordination: Within functional limits  Fine Motor Skills-Upper: Left Intact; Right Intact    Gross Motor Skills-Upper: Left Intact; Right Intact    Tone & Sensation:    Tone: Normal  Sensation: Intact                      Balance:  Sitting: Intact  Standing: Intact; With support    Functional Mobility and Transfers for ADLs:  Bed Mobility:  Rolling:  (seated at bedside chair upon arrival)  Scooting: Supervision    Transfers:  Sit to Stand: Supervision; Additional time  Stand to Sit: Supervision; Additional time  Bed to Chair: Supervision; Additional time (with rolling walker)  Toilet Transfer : Stand-by assistance    ADL Assessment:  Feeding: Independent    Oral Facial Hygiene/Grooming: Supervision (standing)    Bathing: Minimum assistance (LB)    Upper Body Dressing: Supervision    Lower Body Dressing: Moderate assistance    Toileting: Stand by assistance                ADL Intervention and task modifications:     See assessment  Cognitive Retraining  Safety/Judgement: Awareness of environment; Fall prevention;Home safety      Functional Measure:  Barthel Index:    Bathin  Bladder: 10  Bowels: 10  Groomin  Dressin  Feeding: 10  Mobility: 10  Stairs: 0  Toilet Use: 5  Transfer (Bed to Chair and Back): 10  Total: 65       Barthel and G-code impairment scale:  Percentage of impairment CH  0% CI  1-19% CJ  20-39% CK  40-59% CL  60-79% CM  80-99% CN  100%   Barthel Score 0-100 100 99-80 79-60 59-40 20-39 1-19   0   Barthel Score 0-20 20 17-19 13-16 9-12 5-8 1-4 0      The Barthel ADL Index: Guidelines  1. The index should be used as a record of what a patient does, not as a record of what a patient could do. 2. The main aim is to establish degree of independence from any help, physical or verbal, however minor and for whatever reason. 3. The need for supervision renders the patient not independent. 4. A patient's performance should be established using the best available evidence. Asking the patient, friends/relatives and nurses are the usual sources, but direct observation and common sense are also important. However direct testing is not needed. 5. Usually the patient's performance over the preceding 24-48 hours is important, but occasionally longer periods will be relevant. 6. Middle categories imply that the patient supplies over 50 per cent of the effort. 7. Use of aids to be independent is allowed. Tip Blandon., Barthel, D.W. (2174). Functional evaluation: the Barthel Index. [address] [facility] (14)2. Flavia Agarwal bertha CATE Miranda, Inderjit Jimenez., Milad Pool., Kiana Martinez, [address] [place] [address] ().  Measuring the change indisability after inpatient rehabilitation; comparison of the responsiveness of the Barthel Index and Functional [place] Measure. Journal of Neurology, Neurosurgery, and Psychiatry, 664), 685-065. YUKI Clarke.AROLDO, MILLICENT Bryant, & Franca David M.A. (2004.) Assessment of post-stroke quality of life in cost-effectiveness studies: The usefulness of the Barthel Index and the EuroQoL-5D. Quality of Life Research, 13, 240-63         G codes: In compliance with CMSs Claims Based Outcome Reporting, the following G-code set was chosen for this patient based on their primary functional limitation being treated: The outcome measure chosen to determine the severity of the functional limitation was the barthel with a score of 65/100 which was correlated with the impairment scale. ? Self Care:     - CURRENT STATUS: CJ - 20%-39% impaired, limited or restricted    - GOAL STATUS: CI - 1%-19% impaired, limited or restricted    - D/C STATUS:  ---------------To be determined---------------     Occupational Therapy Evaluation Charge Determination   History Examination Decision-Making   MEDIUM Complexity : Expanded review of history including physical, cognitive and psychosocial  history  LOW Complexity : 1-3 performance deficits relating to physical, cognitive , or psychosocial skils that result in activity limitations and / or participation restrictions  MEDIUM Complexity : Patient may present with comorbidities that affect occupational performnce. Miniml to moderate modification of tasks or assistance (eg, physical or verbal ) with assesment(s) is necessary to enable patient to complete evaluation       Based on the above components, the patient evaluation is determined to be of the following complexity level: LOW   Pain:  Pain Scale 1: Numeric (0 - 10)  Pain Intensity 1: 0              Activity Tolerance:     Please refer to the flowsheet for vital signs taken during this treatment.   After treatment:   [x] Patient left in no apparent distress sitting up in chair  [] Patient left in no apparent distress in bed  [x] Call bell left within reach  [x] Nursing notified  [] Caregiver present  [] Bed alarm activated    COMMUNICATION/EDUCATION:   The patients plan of care was discussed with: Physical Therapist, Registered Nurse and patient. [x] Home safety education was provided and the patient/caregiver indicated understanding. [x] Patient have participated as able in goal setting and plan of care. [x] Patient agree to work toward stated goals and plan of care. [] Patient understands intent and goals of therapy, but is neutral about his/her participation. [] Patient is unable to participate in goal setting and plan of care. This patients plan of care is appropriate for delegation to [facility].     Thank you for this referral.  Ryan Hinkle OTR/L  Time Calculation: 21 mins

## 2018-05-30 NOTE — PROGRESS NOTES
ADULT PROTOCOL: JET AEROSOL  REASSESSMENT    Patient  Altagracia Dorsey     60 y.o.   female     5/29/2018  9:48  PM    Breath Sounds Pre Procedure: Right Breath Sounds: Clear, Diminished                               Left Breath Sounds: Clear, Diminished    Breath Sounds Post Procedure: Right Breath Sounds: Clear, Diminished                                 Left Breath Sounds: Clear, Diminished    Breathing pattern: Pre procedure Breathing Pattern: Regular          Post procedure Breathing Pattern: Regular    Heart Rate: Pre procedure Pulse: 82           Post procedure Pulse: 84    Resp Rate: Pre procedure Respirations: 18           Post procedure Respirations: 18    Peak Flow: Pre bronchodilator   n/a          Post bronchodilator   n/a    Incentive Spirometry:   n/a          Cough: Pre procedure Cough: Non-productive               Post procedure Cough: Non-productive      Sputum: Pre procedure  none                 Post procedure  none    Oxygen: O2 Device: Room air   FiO2 (%) room air  21%     Changed: NO    SpO2: Pre procedure SpO2: 100 %   without oxygen              Post procedure SpO2: 100 %  without oxygen    Nebulizer Therapy: Current medications Aerosolized Medications: DuoNeb      Changed: YES    Problem List:   Patient Active Problem List   Diagnosis Code    [facility] A41.9       Respiratory Therapist: Jessica Atkinson RT

## 2018-05-30 NOTE — PROGRESS NOTES
Hospitalist Progress Note      NAME: Daniel Villanueva   :  1955  MRM:  [id number]    Date/Time: 2018  11:06 AM       Assessment / Plan:     Sepsis due to pneumonia and COPD exacerbation  RR >25, WBC 19, infiltrate on CXR, wheeze/rhonchi on exam.ABG - no significant CO2 retention  -given recent hospitalization will treat with broad antibiotics, levaquin  -standing nebs  - cont steroids  -follow cultures     Chronic diastolic heart failure  patient had negative stress test last week   -resume lasix      CKD stage 4  states when she left [facility] Doctor's her Cr was 2.9, now 3.27  -follows with [facility] nephrologist     DM2  -will order lower dose lantus  -SSI     Anemia, likely chronic disease  -started on EPO 10 K weekly last week     Anxiety with panic attacks  -patient and daughter are concerned that paxil is not working, discussed and will start tapering off paxil, will trial escitalopram     Obesity  Body mass index is 45.93 kg/(m^2).     Code Status: full  Surrogate Decision Maker: daughter Andrey Bellemont [phone]     DVT Prophylaxis: SCD  Baseline: independent, recently discharged from [facility] last week         Subjective:     Chief Complaint:  C/o sob        ROS:  (bold if positive, if negative)      Tolerating PT  Tolerating Diet          Objective:       Vitals:          Last 24hrs VS reviewed since prior progress note.  Most recent are:    Visit Vitals    /63    Pulse 81    Temp 98.2 °F (36.8 °C)    Resp 18    Ht 5' 5\" (1.651 m)    Wt 125.2 kg (276 lb)    SpO2 99%    Breastfeeding No    BMI 45.93 kg/m2     SpO2 Readings from Last 6 Encounters:   18 99%            Intake/Output Summary (Last 24 hours) at 18 1018  Last data filed at 18 0910   Gross per 24 hour   Intake              775 ml   Output              975 ml   Net             -200 ml          Exam:     Physical Exam:    Gen:  Well-developed, well-nourished, in no acute distress  HEENT:  Pink conjunctivae, PERRL, hearing intact to voice, moist mucous membranes  Neck:  Supple, without masses, thyroid non-tender  Resp:  No accessory muscle use, clear breath sounds without wheezes, rales or rhonchi  Card:  No murmurs, normal S1, S2 without thrills, bruits or peripheral edema  Abd:  Soft, non-tender, non-distended, normoactive bowel sounds are present  Musc:  No cyanosis, clubbing or edema  Skin:  No rashes or ulcers, skin turgor is good  Neuro:  Cranial nerves 3-12 are grossly intact, follows commands appropriately  Psych:  Good insight, oriented to person, place and time, alert       Telemetry reviewed:   normal sinus rhythm    Medications Reviewed: (see below)    Lab Data Reviewed: (see below)    ______________________________________________________________________    Medications:     Current Facility-Administered Medications   Medication Dose Route Frequency    levoFLOXacin (LEVAQUIN) 500 mg in D5W IVPB  500 mg IntraVENous Q48H    famotidine (PEPCID) tablet 20 mg  20 mg Oral DAILY    albuterol-ipratropium (DUO-NEB) 2.5 MG-0.5 MG/3 ML  3 mL Nebulization TID RT    albuterol-ipratropium (DUO-NEB) 2.5 MG-0.5 MG/3 ML  3 mL Nebulization Q4H PRN    aspirin delayed-release tablet 81 mg  81 mg Oral DAILY    atorvastatin (LIPITOR) tablet 80 mg  80 mg Oral DAILY    loratadine (CLARITIN) tablet 10 mg  10 mg Oral DAILY    methylPREDNISolone (PF) (SOLU-MEDROL) injection 40 mg  40 mg IntraVENous Q12H    guaiFENesin ER (MUCINEX) tablet 600 mg  600 mg Oral Q12H    sodium chloride (NS) flush 5-10 mL  5-10 mL IntraVENous Q8H    sodium chloride (NS) flush 5-10 mL  5-10 mL IntraVENous PRN    acetaminophen (TYLENOL) tablet 650 mg  650 mg Oral Q4H PRN    ondansetron (ZOFRAN) injection 4 mg  4 mg IntraVENous Q4H PRN    docusate sodium (COLACE) capsule 100 mg  100 mg Oral BID PRN    glucose chewable tablet 16 g  4 Tab Oral PRN    dextrose (D50W) injection syrg 12.5-25 g  12.5-25 g IntraVENous PRN    glucagon (GLUCAGEN) injection 1 mg  1 mg IntraMUSCular PRN    insulin lispro (HUMALOG) injection   SubCUTAneous AC&HS    insulin glargine (LANTUS) injection 16 Units  16 Units SubCUTAneous QHS    vancomycin (VANCOCIN) 1750 mg in  ml infusion  1,750 mg IntraVENous Q48H    amLODIPine (NORVASC) tablet 5 mg  5 mg Oral BID    carvedilol (COREG) tablet 25 mg  25 mg Oral BID WITH MEALS    hydrALAZINE (APRESOLINE) tablet 100 mg  100 mg Oral TID    fluticasone (FLONASE) 50 mcg/actuation nasal spray 2 Spray  2 Spray Both Nostrils DAILY    PARoxetine (PAXIL) tablet 30 mg  30 mg Oral DAILY    escitalopram oxalate (LEXAPRO) tablet 5 mg  5 mg Oral DAILY    diphenhydrAMINE (BENADRYL) injection 12.5 mg  12.5 mg IntraVENous Q6H PRN            Lab Review:     Recent Labs      05/29/18   0834  05/28/18   1335   WBC  13.8*  19.1*   HGB  7.3*  7.4*   HCT  23.5*  23.8*   PLT  167  171     Recent Labs      05/30/18   0526  05/29/18   0834  05/28/18   1335   NA  139  140  139   K  3.4*  4.3  4.2   CL  104  110*  111*   CO2  28  21  20*   GLU  124*  150*  131*   BUN  10  57*  61*   CREA  0.73  2.96*  3.27*   CA  9.4  8.0*  7.8*   ALB   --    --   2.9*   TBILI   --    --   0.9   SGOT   --    --   81*   ALT   --    --   134*     Lab Results   Component Value Date/Time    Glucose (POC) 305 (H) 05/30/2018 07:45 AM    Glucose (POC) 413 (H) 05/29/2018 08:45 PM    Glucose (POC) 288 (H) 05/29/2018 04:57 PM    Glucose (POC) 244 (H) 05/29/2018 11:34 AM    Glucose (POC) 157 (H) 05/29/2018 07:26 AM         Total time spent with patient: 30 5 [address] discussed with: Patient    Code Status: Full    Prophylaxis:  Lovenox    Disposition:  Home w/Family           ___________________________________________________    Attending Physician: Daniel Caro MD

## 2018-05-31 LAB
BACTERIA SPEC CULT: ABNORMAL
GRAM STN SPEC: ABNORMAL
SERVICE CMNT-IMP: ABNORMAL

## 2018-06-02 LAB
BACTERIA SPEC CULT: NORMAL
SERVICE CMNT-IMP: NORMAL

## 2018-10-09 ENCOUNTER — HOSPITAL ENCOUNTER (OUTPATIENT)
Dept: INFUSION THERAPY | Age: 63
Discharge: HOME | End: 2018-10-09
Payer: MEDICARE

## 2018-10-09 VITALS
SYSTOLIC BLOOD PRESSURE: 133 MMHG | DIASTOLIC BLOOD PRESSURE: 72 MMHG | TEMPERATURE: 98.6 F | RESPIRATION RATE: 18 BRPM | OXYGEN SATURATION: 100 % | HEART RATE: 86 BPM

## 2018-10-09 PROCEDURE — 86923 COMPATIBILITY TEST ELECTRIC: CPT

## 2018-10-09 PROCEDURE — 36415 COLL VENOUS BLD VENIPUNCTURE: CPT

## 2018-10-09 PROCEDURE — 86900 BLOOD TYPING SEROLOGIC ABO: CPT

## 2018-10-09 NOTE — PROGRESS NOTES
PEDI [facility] VISIT NOTE 
 
0654: Patient arrives for type & cross without acute problems. Please see connect care for complete assessment and education provided. Vital signs stable throughout and prior to discharge, Pt. Tolerated treatment well and discharged without incident. Patient/parent is aware of next [facility] appointment on 10/11/2018. VITAL SIGNS Patient Vitals for the past 12 hrs: 
 Temp Pulse Resp BP SpO2  
10/09/18 1625 98.6 °F (37 °C) 86 18 133/72 100 % LAB WORK Lab Results pending, please see connect care for results.

## 2018-10-10 ENCOUNTER — HOSPITAL ENCOUNTER (OUTPATIENT)
Dept: INFUSION THERAPY | Age: 63
End: 2018-10-10
Payer: MEDICARE

## 2018-10-11 ENCOUNTER — HOSPITAL ENCOUNTER (OUTPATIENT)
Dept: INFUSION THERAPY | Age: 63
Discharge: HOME | End: 2018-10-11
Payer: MEDICARE

## 2018-10-11 VITALS
DIASTOLIC BLOOD PRESSURE: 66 MMHG | SYSTOLIC BLOOD PRESSURE: 131 MMHG | OXYGEN SATURATION: 98 % | RESPIRATION RATE: 16 BRPM | TEMPERATURE: 98.1 F | HEART RATE: 77 BPM

## 2018-10-11 PROCEDURE — 77030013169 SET IV BLD ICUM -A

## 2018-10-11 PROCEDURE — 74011250637 HC RX REV CODE- 250/637

## 2018-10-11 PROCEDURE — 36430 TRANSFUSION BLD/BLD COMPNT: CPT

## 2018-10-11 PROCEDURE — 36415 COLL VENOUS BLD VENIPUNCTURE: CPT

## 2018-10-11 PROCEDURE — P9016 RBC LEUKOCYTES REDUCED: HCPCS

## 2018-10-11 RX ORDER — ACETAMINOPHEN 325 MG/1
650 TABLET ORAL ONCE
Status: COMPLETED | OUTPATIENT
Start: 2018-10-11 | End: 2018-10-11

## 2018-10-11 RX ORDER — DIPHENHYDRAMINE HCL 25 MG
25 CAPSULE ORAL ONCE
Status: COMPLETED | OUTPATIENT
Start: 2018-10-11 | End: 2018-10-11

## 2018-10-11 RX ORDER — SODIUM CHLORIDE 0.9 % (FLUSH) 0.9 %
5-10 SYRINGE (ML) INJECTION AS NEEDED
Status: ACTIVE | OUTPATIENT
Start: 2018-10-11 | End: 2018-10-12

## 2018-10-11 RX ADMIN — Medication 10 ML: at 10:06

## 2018-10-11 RX ADMIN — ACETAMINOPHEN 650 MG: 325 TABLET ORAL at 10:08

## 2018-10-11 RX ADMIN — DIPHENHYDRAMINE HYDROCHLORIDE 25 MG: 25 CAPSULE ORAL at 10:08

## 2018-10-11 NOTE — PROGRESS NOTES
[facility] DISCHARGE INSTRUCTIONS FOR:  BLOOD TRANSFUSION We hope you are feeling better after your blood transfusion. Some mild tenderness or slight bruising at your IV site is normal.  Avoid lifting or heavy use of that extremity for the rest of the day. Drink plenty of fluids, eat a normal diet and get some rest. 
 
There are some important signs that you need to watch for in case you experience a delayed reaction to the blood you have received. Call your physician immediately if you develop any of the following symptoms: 1. Severe headache or backache; 
 
2. Fever above 100 degrees; 
 
3. Chills; 
 
4. Difficulty breathing; 
 
5.  Blood or red color in urine; 
 
6. The feeling of weakness or constant fatigue; 
 
7. Yellowing of the whites of your eyes or skin (jaundice). If your physician is not available, call or go to the nearest emergency room, or dial 911. Lizeth Zhao, Signature: ___________________________ 10/11/2018 Joleen Horowitz Esters

## 2018-10-11 NOTE — PROGRESS NOTES
PEDI [facility] VISIT NOTE 
   
1446 [id number] arrives for Blood Transfusion without acute problems. Please see connect [facility] for complete assessment and education provided.  
  
24 gauge PIV placed in Left hand; + blood return noted. 
   
Vitals Signs: 
Patient Vitals for the past 12 hrs: 
 Temp Pulse Resp BP SpO2  
10/11/18 1430 98.1 °F (36.7 °C) 77 16 131/66 -  
10/11/18 1330 97.6 °F (36.4 °C) 82 16 129/72 -  
10/11/18 1227 97.8 °F (36.6 °C) 79 16 138/76 -  
10/11/18 1157 97.5 °F (36.4 °C) 83 16 131/75 -  
10/11/18 1142 97.8 °F (36.6 °C) 80 16 126/74 -  
10/11/18 1117 97.8 °F (36.6 °C) 82 18 121/67 -  
10/11/18 0949 97.9 °F (36.6 °C) 89 18 118/66 98 %  
 
 
  
1127 First unit initiated. 1330 First unit completed. Patient monitored post transfusion with no s/s of reaction. Educated and provided with written material regarding s/s of delayed reaction to watch for at home. Medications: 
Verified by Riley Pressley RN via [employer] 1. Tylenol PO 
2. Benadryl PO 
  
Patient's PIV was flushed; removed and bandage placed over site. 1445 Vital signs stable throughout and prior to discharge, Patient tolerated treatment well and discharged without incident.

## 2018-10-12 LAB
ABO + RH BLD: NORMAL
BLD PROD TYP BPU: NORMAL
BLOOD GROUP ANTIBODIES SERPL: NORMAL
BPU ID: NORMAL
CROSSMATCH RESULT,%XM: NORMAL
SPECIMEN EXP DATE BLD: NORMAL
STATUS OF UNIT,%ST: NORMAL
UNIT DIVISION, %UDIV: 0

## 2018-10-17 ENCOUNTER — HOSPITAL ENCOUNTER (OUTPATIENT)
Age: 63
Setting detail: OBSERVATION
Discharge: HOME | End: 2018-10-18
Attending: EMERGENCY MEDICINE | Admitting: INTERNAL MEDICINE
Payer: MEDICARE

## 2018-10-17 DIAGNOSIS — N18.4 ANEMIA DUE TO STAGE 4 CHRONIC KIDNEY DISEASE (HCC): Primary | ICD-10-CM

## 2018-10-17 DIAGNOSIS — D63.1 ANEMIA DUE TO STAGE 4 CHRONIC KIDNEY DISEASE (HCC): Primary | ICD-10-CM

## 2018-10-17 PROBLEM — N18.9 ANEMIA DUE TO CHRONIC KIDNEY DISEASE: Status: ACTIVE | Noted: 2018-10-17

## 2018-10-17 PROBLEM — I50.30 (HFPEF) HEART FAILURE WITH PRESERVED EJECTION FRACTION (HCC): Status: ACTIVE | Noted: 2018-10-17

## 2018-10-17 PROBLEM — E11.22 DM TYPE 2 CAUSING CKD STAGE 4 (HCC): Status: ACTIVE | Noted: 2018-10-17

## 2018-10-17 LAB
ALBUMIN SERPL-MCNC: 3.1 G/DL (ref 3.5–5)
ALBUMIN/GLOB SERPL: 0.8 {RATIO} (ref 1.1–2.2)
ALP SERPL-CCNC: 99 U/L (ref 45–117)
ALT SERPL-CCNC: 17 U/L (ref 12–78)
ANION GAP SERPL CALC-SCNC: 8 MMOL/L (ref 5–15)
APPEARANCE UR: ABNORMAL
AST SERPL-CCNC: 19 U/L (ref 15–37)
BACTERIA URNS QL MICRO: NEGATIVE /HPF
BASOPHILS # BLD: 0 K/UL (ref 0–0.1)
BASOPHILS NFR BLD: 0 % (ref 0–1)
BILIRUB SERPL-MCNC: 0.6 MG/DL (ref 0.2–1)
BILIRUB UR QL: NEGATIVE
BUN SERPL-MCNC: 66 MG/DL (ref 6–20)
BUN/CREAT SERPL: 18 (ref 12–20)
CALCIUM SERPL-MCNC: 8.2 MG/DL (ref 8.5–10.1)
CHLORIDE SERPL-SCNC: 102 MMOL/L (ref 97–108)
CO2 SERPL-SCNC: 29 MMOL/L (ref 21–32)
COLOR UR: ABNORMAL
CREAT SERPL-MCNC: 3.64 MG/DL (ref 0.55–1.02)
CREAT UR-MCNC: 27.85 MG/DL
DIFFERENTIAL METHOD BLD: ABNORMAL
EOSINOPHIL # BLD: 0.2 K/UL (ref 0–0.4)
EOSINOPHIL NFR BLD: 2 % (ref 0–7)
EPITH CASTS URNS QL MICRO: ABNORMAL /LPF
ERYTHROCYTE [DISTWIDTH] IN BLOOD BY AUTOMATED COUNT: 15.3 % (ref 11.5–14.5)
EST. AVERAGE GLUCOSE BLD GHB EST-MCNC: 151 MG/DL
GLOBULIN SER CALC-MCNC: 4 G/DL (ref 2–4)
GLUCOSE BLD STRIP.AUTO-MCNC: 170 MG/DL (ref 65–100)
GLUCOSE BLD STRIP.AUTO-MCNC: 231 MG/DL (ref 65–100)
GLUCOSE SERPL-MCNC: 183 MG/DL (ref 65–100)
GLUCOSE UR STRIP.AUTO-MCNC: NEGATIVE MG/DL
HBA1C MFR BLD: 6.9 % (ref 4.2–6.3)
HCT VFR BLD AUTO: 22.6 % (ref 35–47)
HEMOCCULT STL QL: NEGATIVE
HGB BLD-MCNC: 6.8 G/DL (ref 11.5–16)
HGB UR QL STRIP: NEGATIVE
HYALINE CASTS URNS QL MICRO: ABNORMAL /LPF (ref 0–5)
IMM GRANULOCYTES # BLD: 0 K/UL (ref 0–0.04)
IMM GRANULOCYTES NFR BLD AUTO: 0 % (ref 0–0.5)
INR PPP: 1 (ref 0.9–1.1)
KETONES UR QL STRIP.AUTO: NEGATIVE MG/DL
LEUKOCYTE ESTERASE UR QL STRIP.AUTO: ABNORMAL
LYMPHOCYTES # BLD: 2 K/UL (ref 0.8–3.5)
LYMPHOCYTES NFR BLD: 16 % (ref 12–49)
MAGNESIUM SERPL-MCNC: 1.8 MG/DL (ref 1.6–2.4)
MCH RBC QN AUTO: 25.4 PG (ref 26–34)
MCHC RBC AUTO-ENTMCNC: 30.1 G/DL (ref 30–36.5)
MCV RBC AUTO: 84.3 FL (ref 80–99)
MONOCYTES # BLD: 0.6 K/UL (ref 0–1)
MONOCYTES NFR BLD: 5 % (ref 5–13)
NEUTS SEG # BLD: 9.4 K/UL (ref 1.8–8)
NEUTS SEG NFR BLD: 77 % (ref 32–75)
NITRITE UR QL STRIP.AUTO: NEGATIVE
NRBC # BLD: 0 K/UL (ref 0–0.01)
NRBC BLD-RTO: 0 PER 100 WBC
PH UR STRIP: 6 [PH] (ref 5–8)
PLATELET # BLD AUTO: 232 K/UL (ref 150–400)
PMV BLD AUTO: 12.1 FL (ref 8.9–12.9)
POTASSIUM SERPL-SCNC: 4.7 MMOL/L (ref 3.5–5.1)
PROT SERPL-MCNC: 7.1 G/DL (ref 6.4–8.2)
PROT UR STRIP-MCNC: 30 MG/DL
PROT UR-MCNC: 37 MG/DL (ref 0–11.9)
PROT/CREAT UR-RTO: 1.3
PROTHROMBIN TIME: 10.5 SEC (ref 9–11.1)
RBC # BLD AUTO: 2.68 M/UL (ref 3.8–5.2)
RBC #/AREA URNS HPF: ABNORMAL /HPF (ref 0–5)
RBC MORPH BLD: ABNORMAL
SERVICE CMNT-IMP: ABNORMAL
SERVICE CMNT-IMP: ABNORMAL
SODIUM SERPL-SCNC: 139 MMOL/L (ref 136–145)
SP GR UR REFRACTOMETRY: 1.01 (ref 1–1.03)
UROBILINOGEN UR QL STRIP.AUTO: 0.2 EU/DL (ref 0.2–1)
WBC # BLD AUTO: 12.2 K/UL (ref 3.6–11)
WBC URNS QL MICRO: ABNORMAL /HPF (ref 0–4)

## 2018-10-17 PROCEDURE — 74011636637 HC RX REV CODE- 636/637: Performed by: INTERNAL MEDICINE

## 2018-10-17 PROCEDURE — 86900 BLOOD TYPING SEROLOGIC ABO: CPT | Performed by: EMERGENCY MEDICINE

## 2018-10-17 PROCEDURE — C9113 INJ PANTOPRAZOLE SODIUM, VIA: HCPCS | Performed by: EMERGENCY MEDICINE

## 2018-10-17 PROCEDURE — 99284 EMERGENCY DEPT VISIT MOD MDM: CPT

## 2018-10-17 PROCEDURE — 74011000250 HC RX REV CODE- 250: Performed by: INTERNAL MEDICINE

## 2018-10-17 PROCEDURE — 83036 HEMOGLOBIN GLYCOSYLATED A1C: CPT | Performed by: INTERNAL MEDICINE

## 2018-10-17 PROCEDURE — 83735 ASSAY OF MAGNESIUM: CPT | Performed by: EMERGENCY MEDICINE

## 2018-10-17 PROCEDURE — 96374 THER/PROPH/DIAG INJ IV PUSH: CPT

## 2018-10-17 PROCEDURE — 99218 HC RM OBSERVATION: CPT

## 2018-10-17 PROCEDURE — 84156 ASSAY OF PROTEIN URINE: CPT | Performed by: INTERNAL MEDICINE

## 2018-10-17 PROCEDURE — 85025 COMPLETE CBC W/AUTO DIFF WBC: CPT | Performed by: EMERGENCY MEDICINE

## 2018-10-17 PROCEDURE — 36415 COLL VENOUS BLD VENIPUNCTURE: CPT | Performed by: EMERGENCY MEDICINE

## 2018-10-17 PROCEDURE — 86920 COMPATIBILITY TEST SPIN: CPT | Performed by: EMERGENCY MEDICINE

## 2018-10-17 PROCEDURE — 80053 COMPREHEN METABOLIC PANEL: CPT | Performed by: EMERGENCY MEDICINE

## 2018-10-17 PROCEDURE — 36430 TRANSFUSION BLD/BLD COMPNT: CPT

## 2018-10-17 PROCEDURE — 94640 AIRWAY INHALATION TREATMENT: CPT

## 2018-10-17 PROCEDURE — 81001 URINALYSIS AUTO W/SCOPE: CPT | Performed by: EMERGENCY MEDICINE

## 2018-10-17 PROCEDURE — 74011250637 HC RX REV CODE- 250/637: Performed by: INTERNAL MEDICINE

## 2018-10-17 PROCEDURE — P9016 RBC LEUKOCYTES REDUCED: HCPCS | Performed by: EMERGENCY MEDICINE

## 2018-10-17 PROCEDURE — 74011250636 HC RX REV CODE- 250/636: Performed by: EMERGENCY MEDICINE

## 2018-10-17 PROCEDURE — 85610 PROTHROMBIN TIME: CPT | Performed by: EMERGENCY MEDICINE

## 2018-10-17 PROCEDURE — 82272 OCCULT BLD FECES 1-3 TESTS: CPT | Performed by: EMERGENCY MEDICINE

## 2018-10-17 PROCEDURE — 82962 GLUCOSE BLOOD TEST: CPT

## 2018-10-17 RX ORDER — IPRATROPIUM BROMIDE AND ALBUTEROL SULFATE 2.5; .5 MG/3ML; MG/3ML
3 SOLUTION RESPIRATORY (INHALATION)
Status: DISCONTINUED | OUTPATIENT
Start: 2018-10-17 | End: 2018-10-18 | Stop reason: HOSPADM

## 2018-10-17 RX ORDER — RANITIDINE 150 MG/1
150 TABLET, FILM COATED ORAL 2 TIMES DAILY
Status: ON HOLD | COMMUNITY
End: 2018-10-18 | Stop reason: SDUPTHER

## 2018-10-17 RX ORDER — ATORVASTATIN CALCIUM 20 MG/1
40 TABLET, FILM COATED ORAL DAILY
COMMUNITY

## 2018-10-17 RX ORDER — SODIUM CHLORIDE 0.9 % (FLUSH) 0.9 %
5-10 SYRINGE (ML) INJECTION AS NEEDED
Status: DISCONTINUED | OUTPATIENT
Start: 2018-10-17 | End: 2018-10-18 | Stop reason: HOSPADM

## 2018-10-17 RX ORDER — INSULIN GLARGINE 100 [IU]/ML
20 INJECTION, SOLUTION SUBCUTANEOUS
Status: DISCONTINUED | OUTPATIENT
Start: 2018-10-17 | End: 2018-10-17

## 2018-10-17 RX ORDER — ATORVASTATIN CALCIUM 20 MG/1
40 TABLET, FILM COATED ORAL DAILY
Status: DISCONTINUED | OUTPATIENT
Start: 2018-10-18 | End: 2018-10-18 | Stop reason: HOSPADM

## 2018-10-17 RX ORDER — HYDRALAZINE HYDROCHLORIDE 25 MG/1
100 TABLET, FILM COATED ORAL 3 TIMES DAILY
Status: DISCONTINUED | OUTPATIENT
Start: 2018-10-17 | End: 2018-10-18 | Stop reason: HOSPADM

## 2018-10-17 RX ORDER — MAGNESIUM SULFATE 100 %
4 CRYSTALS MISCELLANEOUS AS NEEDED
Status: DISCONTINUED | OUTPATIENT
Start: 2018-10-17 | End: 2018-10-18 | Stop reason: HOSPADM

## 2018-10-17 RX ORDER — IPRATROPIUM BROMIDE AND ALBUTEROL SULFATE 2.5; .5 MG/3ML; MG/3ML
3 SOLUTION RESPIRATORY (INHALATION)
Status: DISCONTINUED | OUTPATIENT
Start: 2018-10-17 | End: 2018-10-17

## 2018-10-17 RX ORDER — ASPIRIN 81 MG/1
81 TABLET ORAL DAILY
Status: DISCONTINUED | OUTPATIENT
Start: 2018-10-18 | End: 2018-10-17

## 2018-10-17 RX ORDER — MONTELUKAST SODIUM 10 MG/1
10 TABLET ORAL DAILY
COMMUNITY

## 2018-10-17 RX ORDER — MONTELUKAST SODIUM 10 MG/1
10 TABLET ORAL DAILY
Status: DISCONTINUED | OUTPATIENT
Start: 2018-10-18 | End: 2018-10-18 | Stop reason: HOSPADM

## 2018-10-17 RX ORDER — LOSARTAN POTASSIUM 50 MG/1
50 TABLET ORAL DAILY
COMMUNITY

## 2018-10-17 RX ORDER — DEXTROSE 50 % IN WATER (D50W) INTRAVENOUS SYRINGE
25-50 AS NEEDED
Status: DISCONTINUED | OUTPATIENT
Start: 2018-10-17 | End: 2018-10-18 | Stop reason: HOSPADM

## 2018-10-17 RX ORDER — SODIUM CHLORIDE 9 MG/ML
250 INJECTION, SOLUTION INTRAVENOUS AS NEEDED
Status: DISCONTINUED | OUTPATIENT
Start: 2018-10-17 | End: 2018-10-18 | Stop reason: HOSPADM

## 2018-10-17 RX ORDER — INSULIN GLARGINE 100 [IU]/ML
30 INJECTION, SOLUTION SUBCUTANEOUS DAILY
COMMUNITY

## 2018-10-17 RX ORDER — ALBUTEROL SULFATE 0.83 MG/ML
1.25 SOLUTION RESPIRATORY (INHALATION)
Status: DISCONTINUED | OUTPATIENT
Start: 2018-10-17 | End: 2018-10-18 | Stop reason: HOSPADM

## 2018-10-17 RX ORDER — INSULIN GLARGINE 100 [IU]/ML
20 INJECTION, SOLUTION SUBCUTANEOUS DAILY
Status: DISCONTINUED | OUTPATIENT
Start: 2018-10-18 | End: 2018-10-18 | Stop reason: HOSPADM

## 2018-10-17 RX ORDER — ALBUTEROL SULFATE 0.83 MG/ML
1.25 SOLUTION RESPIRATORY (INHALATION)
COMMUNITY

## 2018-10-17 RX ORDER — ALBUTEROL SULFATE 90 UG/1
2 AEROSOL, METERED RESPIRATORY (INHALATION)
COMMUNITY

## 2018-10-17 RX ORDER — LANOLIN ALCOHOL/MO/W.PET/CERES
325 CREAM (GRAM) TOPICAL 2 TIMES DAILY WITH MEALS
COMMUNITY

## 2018-10-17 RX ORDER — CARVEDILOL 6.25 MG/1
25 TABLET ORAL 2 TIMES DAILY WITH MEALS
Status: DISCONTINUED | OUTPATIENT
Start: 2018-10-17 | End: 2018-10-18 | Stop reason: HOSPADM

## 2018-10-17 RX ORDER — INSULIN LISPRO 100 [IU]/ML
INJECTION, SOLUTION INTRAVENOUS; SUBCUTANEOUS
Status: DISCONTINUED | OUTPATIENT
Start: 2018-10-17 | End: 2018-10-18 | Stop reason: HOSPADM

## 2018-10-17 RX ORDER — DOCUSATE SODIUM 100 MG/1
100 CAPSULE, LIQUID FILLED ORAL 2 TIMES DAILY
Status: DISCONTINUED | OUTPATIENT
Start: 2018-10-17 | End: 2018-10-18 | Stop reason: HOSPADM

## 2018-10-17 RX ORDER — LORATADINE 10 MG/1
10 TABLET ORAL DAILY
Status: DISCONTINUED | OUTPATIENT
Start: 2018-10-18 | End: 2018-10-18 | Stop reason: HOSPADM

## 2018-10-17 RX ORDER — BUMETANIDE 2 MG/1
2 TABLET ORAL 2 TIMES DAILY
COMMUNITY

## 2018-10-17 RX ORDER — ACETAMINOPHEN 500 MG
500 TABLET ORAL
COMMUNITY
End: 2021-02-04 | Stop reason: SDUPTHER

## 2018-10-17 RX ORDER — LANOLIN ALCOHOL/MO/W.PET/CERES
325 CREAM (GRAM) TOPICAL 2 TIMES DAILY WITH MEALS
Status: DISCONTINUED | OUTPATIENT
Start: 2018-10-17 | End: 2018-10-18 | Stop reason: HOSPADM

## 2018-10-17 RX ORDER — BUDESONIDE AND FORMOTEROL FUMARATE DIHYDRATE 160; 4.5 UG/1; UG/1
2 AEROSOL RESPIRATORY (INHALATION) 2 TIMES DAILY
COMMUNITY

## 2018-10-17 RX ORDER — ALBUTEROL SULFATE 0.83 MG/ML
2.5 SOLUTION RESPIRATORY (INHALATION)
Status: DISCONTINUED | OUTPATIENT
Start: 2018-10-17 | End: 2018-10-18 | Stop reason: HOSPADM

## 2018-10-17 RX ORDER — BUMETANIDE 1 MG/1
2 TABLET ORAL 2 TIMES DAILY
Status: DISCONTINUED | OUTPATIENT
Start: 2018-10-17 | End: 2018-10-18 | Stop reason: HOSPADM

## 2018-10-17 RX ORDER — INSULIN GLARGINE 100 [IU]/ML
30 INJECTION, SOLUTION SUBCUTANEOUS DAILY
Status: DISCONTINUED | OUTPATIENT
Start: 2018-10-18 | End: 2018-10-17

## 2018-10-17 RX ORDER — FLUTICASONE PROPIONATE 50 MCG
2 SPRAY, SUSPENSION (ML) NASAL 2 TIMES DAILY
Status: DISCONTINUED | OUTPATIENT
Start: 2018-10-17 | End: 2018-10-18 | Stop reason: HOSPADM

## 2018-10-17 RX ORDER — SODIUM CHLORIDE 0.9 % (FLUSH) 0.9 %
5-10 SYRINGE (ML) INJECTION EVERY 8 HOURS
Status: DISCONTINUED | OUTPATIENT
Start: 2018-10-17 | End: 2018-10-18 | Stop reason: HOSPADM

## 2018-10-17 RX ORDER — HYDRALAZINE HYDROCHLORIDE 50 MG/1
100 TABLET, FILM COATED ORAL 3 TIMES DAILY
COMMUNITY

## 2018-10-17 RX ORDER — PANTOPRAZOLE SODIUM 40 MG/10ML
40 INJECTION, POWDER, LYOPHILIZED, FOR SOLUTION INTRAVENOUS
Status: COMPLETED | OUTPATIENT
Start: 2018-10-17 | End: 2018-10-17

## 2018-10-17 RX ORDER — ONDANSETRON 2 MG/ML
4 INJECTION INTRAMUSCULAR; INTRAVENOUS
Status: DISCONTINUED | OUTPATIENT
Start: 2018-10-17 | End: 2018-10-18 | Stop reason: HOSPADM

## 2018-10-17 RX ORDER — PAROXETINE HYDROCHLORIDE 20 MG/1
30 TABLET, FILM COATED ORAL DAILY
Status: DISCONTINUED | OUTPATIENT
Start: 2018-10-18 | End: 2018-10-18 | Stop reason: HOSPADM

## 2018-10-17 RX ORDER — INSULIN GLARGINE 100 [IU]/ML
10 INJECTION, SOLUTION SUBCUTANEOUS
Status: DISCONTINUED | OUTPATIENT
Start: 2018-10-17 | End: 2018-10-18 | Stop reason: HOSPADM

## 2018-10-17 RX ORDER — QUETIAPINE FUMARATE 25 MG/1
25 TABLET, FILM COATED ORAL 3 TIMES DAILY
Status: DISCONTINUED | OUTPATIENT
Start: 2018-10-17 | End: 2018-10-18 | Stop reason: HOSPADM

## 2018-10-17 RX ORDER — QUETIAPINE FUMARATE 25 MG/1
25 TABLET, FILM COATED ORAL 3 TIMES DAILY
COMMUNITY

## 2018-10-17 RX ORDER — LOSARTAN POTASSIUM 50 MG/1
50 TABLET ORAL DAILY
Status: DISCONTINUED | OUTPATIENT
Start: 2018-10-18 | End: 2018-10-18 | Stop reason: HOSPADM

## 2018-10-17 RX ORDER — ACETAMINOPHEN 325 MG/1
650 TABLET ORAL
Status: DISCONTINUED | OUTPATIENT
Start: 2018-10-17 | End: 2018-10-18 | Stop reason: HOSPADM

## 2018-10-17 RX ADMIN — DOCUSATE SODIUM 100 MG: 100 CAPSULE, LIQUID FILLED ORAL at 18:58

## 2018-10-17 RX ADMIN — PANTOPRAZOLE SODIUM 40 MG: 40 INJECTION, POWDER, FOR SOLUTION INTRAVENOUS at 16:21

## 2018-10-17 RX ADMIN — HYDRALAZINE HYDROCHLORIDE 100 MG: 25 TABLET, FILM COATED ORAL at 21:38

## 2018-10-17 RX ADMIN — CARVEDILOL 25 MG: 12.5 TABLET, FILM COATED ORAL at 18:58

## 2018-10-17 RX ADMIN — IPRATROPIUM BROMIDE AND ALBUTEROL SULFATE 3 ML: .5; 3 SOLUTION RESPIRATORY (INHALATION) at 17:02

## 2018-10-17 RX ADMIN — INSULIN LISPRO 2 UNITS: 100 INJECTION, SOLUTION INTRAVENOUS; SUBCUTANEOUS at 18:57

## 2018-10-17 RX ADMIN — INSULIN GLARGINE 10 UNITS: 100 INJECTION, SOLUTION SUBCUTANEOUS at 21:40

## 2018-10-17 RX ADMIN — FLUTICASONE PROPIONATE 2 SPRAY: 50 SPRAY, METERED NASAL at 21:39

## 2018-10-17 RX ADMIN — Medication 10 ML: at 21:42

## 2018-10-17 RX ADMIN — INSULIN LISPRO 2 UNITS: 100 INJECTION, SOLUTION INTRAVENOUS; SUBCUTANEOUS at 21:41

## 2018-10-17 RX ADMIN — FERROUS SULFATE TAB 325 MG (65 MG ELEMENTAL FE) 325 MG: 325 (65 FE) TAB at 18:57

## 2018-10-17 RX ADMIN — Medication 10 ML: at 16:21

## 2018-10-17 RX ADMIN — BUMETANIDE 2 MG: 1 TABLET ORAL at 18:57

## 2018-10-17 RX ADMIN — QUETIAPINE FUMARATE 25 MG: 25 TABLET ORAL at 21:39

## 2018-10-17 NOTE — ED PROVIDER NOTES
58 y.o. female hx of CKD, COPD, DHF, HTN, DM, who follows with nephrology, not on dialysis yet, but discussing possibility. She has been having low Hg recently for the last few weeks being seen by her nephrologist. She was given a blood transfusion as an outpatient last week in the infusion center, followup labs found persistently low Hg. Discussed with nephrologist who rec'd admission for further nephro w/u. Also notes hx of intermittent melanotic stools, possible GI bleeding as etiology for her sx. No CP, SOB, syncope, light-headedness, but has had some intermittent muscle cramps as a result of her anemia. 58 y.o. Past Medical History:  
Diagnosis Date  CKD (chronic kidney disease) stage 4, GFR 15-29 ml/min ([facility])  COPD (chronic obstructive pulmonary disease) ([facility])  Diabetes [facility]  Diastolic heart failure ([facility]  Hypertension  Pulmonary HTN ([facility] Past Surgical History:  
Procedure Laterality Date  HX CHOLECYSTECTOMY  HX GYN    
   HX HEENT    
 tonsillectomy Family History:  
Problem Relation Age of Onset  Hypertension Mother  Heart Failure Mother Social History Socioeconomic History  Marital status: LEGALLY  Spouse name: Not on file  Number of children: Not on file  Years of education: Not on file  Highest education level: Not on file Social Needs  Financial resource strain: Not on file  Food insecurity - worry: Not on file  Food insecurity - inability: Not on file  Transportation needs - medical: Not on file  Transportation needs - non-medical: Not on file Occupational History  Not on file Tobacco Use  Smoking status: Never Smoker  Smokeless tobacco: Never Used Substance and Sexual Activity  Alcohol use: No  
 Drug use: No  
 Sexual activity: Not on file Other Topics Concern  Not on file Social History Narrative  Not on file ALLERGIES: Codeine and Lisinopril Review of Systems Constitutional: Positive for activity change and fatigue. Negative for chills and fever. HENT: Negative for congestion, rhinorrhea, sinus pressure and sneezing. Respiratory: Negative for cough, chest tightness and shortness of breath. Cardiovascular: Negative for chest pain. Gastrointestinal: Positive for blood in stool (melanotic stool). Negative for abdominal pain, diarrhea, nausea and vomiting. Genitourinary: Negative for difficulty urinating, dysuria, hematuria and vaginal bleeding. Musculoskeletal: Positive for myalgias (muscle cramps intermittently). Negative for back pain, gait problem and neck pain. Skin: Negative for rash and wound. Neurological: Negative for syncope, speech difficulty, weakness, light-headedness, numbness and headaches. All other systems reviewed and are negative. Vitals:  
 10/17/18 1341 BP: 140/52 Pulse: 98 Resp: 14 Temp: 98.8 °F (37.1 °C) SpO2: 94% Weight: 122 kg (269 lb) Height: 5' 5\" (1.651 m) Physical Exam  
Constitutional: She is oriented to person, place, and time. She appears well-developed and well-nourished. No distress. HENT:  
Head: Normocephalic and atraumatic. Nose: Nose normal.  
Mouth/Throat: Oropharynx is clear and moist.  
Eyes: EOM are normal. Pupils are equal, round, and reactive to light. Conjunctival pallor Neck: Neck supple. Cardiovascular: Normal rate, regular rhythm, normal heart sounds and intact distal pulses. Pulmonary/Chest: Effort normal and breath sounds normal.  
Abdominal: Soft. She exhibits no distension. There is no tenderness. obese Genitourinary: Rectal exam shows no external hemorrhoid, no fissure, no tenderness, anal tone normal and guaiac negative stool. Musculoskeletal: She exhibits edema. She exhibits no tenderness. Neurological: She is alert and oriented to person, place, and time.  No cranial nerve deficit. Coordination normal.  
Skin: Skin is warm and dry. She is not diaphoretic. Nursing note and vitals reviewed. MDM 
  58 y.o. female presents with anemia and reported dark, melanotic stools, possible GI bleed. Exam as above with significant conjunctival pallor concerning for anemia. Guaiac negative with brown formed stool on exam. 
 
Labs show Hg 6.8, WBC 12.2, normal plts, BUN 66, Cr 3.64. She was ordered a unit of blood to be transfused and was admitted to the hospitalist for further care and assessment. Procedures

## 2018-10-17 NOTE — H&P
Admission History and Physical 
 
 
NAME:  Dina Edmonds :   1955 MRN:  [id number] PCP:  Junior Ortega MD  
 
Date/Time:  10/17/2018 Assessment/Plan: Anemia due to chronic kidney disease (10/17/2018): Patient has been chronically anemic. HGB in May of this year was 7.3-7.4. Hemoccult done is negative in ED today. Suspect dark colored stools are due to iron supplementation. Suspect chronic anemia due to CKD. Has procrit at home, but has not yet started it. Had EGD and colonoscopy in the past 2-3 years without etiology for anemia. -- transfuse 1 unit prbc due to fatigue as symptom -- repeat labs in AM 
 -- GI consult -- continue iron CKD (chronic kidney disease) stage 4, GFR 15-29 ml/min ([facility]) ():  Followed by Dr. Paul Dickson. Unclear baseline. No urgent needs for HD. 
 -- nephrology consult Hypertension ():  BP controlled. -- continue coreg, hydralazine, amlodipine, and losartan COPD (chronic obstructive pulmonary disease) ([facility]) ():  Reports chronic dyspnea and this is currently at baseline. -- nebs prn 
 
DM type 2 causing CKD stage 4 ([facility] (10/17/2018):  Glucose 183 by labs. -- restart long acting insulin and hold scheduled mealtime insulin 
 -- add SSI (HFpEF) heart failure with preserved ejection fraction ([facility] (10/17/2018): 
 -- resume bumex 
 -- daily weight Subjective: CHIEF COMPLAINT:  fatigue HISTORY OF PRESENT ILLNESS:    
Ms. Blima Apley is a 58 y.o.  female who is admitted with Anemia due to chronic kidney disease. Ms. Blima Apley presented to the Emergency Department today complaining of fatigue. Has been more fatigued for the past 2 weeks. Was seen by Dr. Paul Dickson and recommended to go to ED due to anemia. Patient instead was seen by PCP and had transfusion of 1 unit prbc. Since then has been more tired again. Sleeping more than usual.  Denies sob or chest pain. Denies lightheaded or dizziness.   No fever, chills. Has poor appetite. Urinating less. No diarrhea or constipation but reports stools are dark. No visible blood loss. Past Medical History:  
Diagnosis Date  CKD (chronic kidney disease) stage 4, GFR 15-29 ml/min ([facility])  COPD (chronic obstructive pulmonary disease) ([facility])  Diabetes ([facility]  Diastolic heart failure ([facility]  Hypertension  [facility] Past Surgical History:  
Procedure Laterality Date  HX CHOLECYSTECTOMY  HX GYN    
   HX HEENT    
 tonsillectomy Social History Tobacco Use  Smoking status: Never Smoker  Smokeless tobacco: Never Used Substance Use Topics  Alcohol use: No  
  
 
Family History Problem Relation Age of Onset  Hypertension Mother  Heart Failure Mother Allergies Allergen Reactions  Codeine Hives  Lisinopril Hives Prior to Admission medications Medication Sig Start Date End Date Taking? Authorizing Provider  
predniSONE (DELTASONE) 20 mg tablet Take 3 tabs by mouth twice a day for 3 days. Then take 2 tabs twice a day for 3 days, then take 1 tab twice a day for 3 days, then take 1 tab daily for 3 days. 18   Amada Guerrero MD  
atorvastatin (LIPITOR) 40 mg tablet Take 80 mg by mouth daily. Magdalena Gama MD  
insulin glargine (LANTUS U-100 INSULIN) 100 unit/mL injection 35 Units by SubCUTAneous route nightly. Magdalena Gama MD  
insulin lispro (HUMALOG U-100 INSULIN) 100 unit/mL injection 60 Units by SubCUTAneous route Before breakfast, lunch, and dinner. Magdalena Gama MD  
insulin lispro (HUMALOG U-100 INSULIN) 100 unit/mL injection by SubCUTAneous route. Sliding scale    Magdalena Gama MD  
aspirin delayed-release 81 mg tablet Take 81 mg by mouth daily. Magdalena Gama MD  
loratadine (CLARITIN) 10 mg tablet Take 10 mg by mouth. Magdalena Gama MD  
albuterol-ipratropium (DUO-NEB) 2.5 mg-0.5 mg/3 ml nebu 3 mL by Nebulization route.     Magdalena Gama MD  
 furosemide (LASIX) 40 mg tablet Take 40 mg by mouth daily. Other, MD Magdalena  
ergocalciferol (VITAMIN D2) 50,000 unit capsule Take 50,000 Units by mouth every seven (7) days. Provider, Historical  
PARoxetine (PAXIL) 30 mg tablet Take 60 mg by mouth daily. Provider, Historical  
carvedilol (COREG) 25 mg tablet Take 25 mg by mouth two (2) times daily (with meals). Provider, Historical  
hydrALAZINE (APRESOLINE) 100 mg tablet Take 100 mg by mouth three (3) times daily. Provider, Historical  
fluticasone (FLONASE ALLERGY RELIEF) 50 mcg/actuation nasal spray 2 Sprays by Both Nostrils route daily. Provider, Historical  
amLODIPine (NORVASC) 5 mg tablet Take 5 mg by mouth two (2) times a day. Provider, Historical  
 
 
 
Review of Systems: 
(bold if positive, if negative) Gen:  fatigueEyes:  ENT:  CVS:  Pulm:  GI:   
:   
MS:  Skin:  Endo:   
Hem:  Renal:   
Neuro:    
 
 
  
Objective: VITALS:   
Vital signs reviewed; most recent are: 
 
Visit Vitals /52 (BP 1 Location: Right arm, BP Patient Position: At rest) Pulse 98 Temp 98.8 °F (37.1 °C) Resp 14 Ht 5' 5\" (1.651 m) Wt 122 kg (269 lb) SpO2 94% BMI 44.76 kg/m² SpO2 Readings from Last 6 Encounters:  
10/17/18 94% 10/11/18 98% 10/09/18 100% 05/30/18 97% No intake or output data in the 24 hours ending 10/17/18 1621 Exam:  
 
Physical Exam: 
 
Gen:  Well-developed, well-nourished, in no acute distress HEENT:  Pale conjunctivae, hearing intact to voice, severely diminished vision on left, moist mucous membranes Resp:  No accessory muscle use, clear breath sounds without wheezes rales or rhonchi 
Card:  No murmurs, normal S1, S2 without thrills or peripheral edema Abd:  Soft, non-tender, non-distended, normoactive bowel sounds are present Musc:  No cyanosis Skin:  No rashes or ulcers, skin turgor is good Neuro:  Cranial nerves 4-12 are grossly intact,  strength is 5/5 bilaterally, dorsi / plantarflexion strength is 5/5 bilaterally, follows commands appropriately Psych:  Alert with good insight. Oriented to person, place, and time Labs: 
 
Recent Labs 10/17/18 
1423 WBC 12.2* HGB 6.8* HCT 22.6*  
 Recent Labs 10/17/18 
1423   
K 4.7  CO2 29 * BUN 66* CREA 3.64* CA 8.2* MG 1.8 ALB 3.1* TBILI 0.6 SGOT 19 ALT 17 Lab Results Component Value Date/Time Glucose (POC) 364 (H) 05/30/2018 04:26 PM  
 Glucose (POC) 388 (H) 05/30/2018 11:02 AM  
 
No results for input(s): PH, PCO2, PO2, HCO3, FIO2 in the last 72 hours. Recent Labs 10/17/18 
1535 INR 1.0 Medical records reviewed in preparation for this admission: Old medical records. Surrogate decision maker:  daughter Total time spent in care of this patient: 50 Minutes Care Plan discussed with: Patient and Family Discussed:  Care Plan Prophylaxis:  SCD's 
 
Probable Disposition:  Home w/Family 
        
___________________________________________________ Attending Physician: Ashanti Loya MD

## 2018-10-17 NOTE — ROUTINE PROCESS
TRANSFER - OUT REPORT: 
 
Verbal report given to Johanna RN(name) on Dina Edmonds  being transferred to medical(unit) for routine progression of care Report consisted of patients Situation, Background, Assessment and  
Recommendations(SBAR). Information from the following report(s) SBAR, ED Summary and MAR was reviewed with the receiving nurse. Lines:  
Peripheral IV 10/17/18 Left Wrist (Active) Site Assessment Clean, dry, & intact 10/17/2018  2:20 PM  
Phlebitis Assessment 0 10/17/2018  2:20 PM  
Infiltration Assessment 0 10/17/2018  2:20 PM  
Dressing Status Clean, dry, & intact 10/17/2018  2:20 PM  
Dressing Type Tape;Transparent 10/17/2018  2:20 PM  
Hub Color/Line Status Blue;Flushed;Patent 10/17/2018  2:20 PM  
Action Taken Blood drawn 10/17/2018  2:20 PM  
   
Peripheral IV 10/17/18 Right;Upper (Active) Site Assessment Clean, dry, & intact 10/17/2018  3:35 PM  
Phlebitis Assessment 0 10/17/2018  3:35 PM  
Infiltration Assessment 0 10/17/2018  3:35 PM  
Dressing Status Clean, dry, & intact 10/17/2018  3:35 PM  
Dressing Type Tape;Transparent 10/17/2018  3:35 PM  
Hub Color/Line Status Pink;Flushed;Patent 10/17/2018  3:35 PM  
Action Taken Blood drawn 10/17/2018  3:35 PM  
  
 
Opportunity for questions and clarification was provided. Patient transported with: 
 Registered Nurse

## 2018-10-17 NOTE — CONSULTS
[facility]    Consult  note dictated, #  [id number]. See Transcription tab if not in Consult section. Anemia w/u ordered by Dr. Shanell Tejeda. She is also volume overloaded and diuretics have been ordered. Fe sat pending.   Will check urine protein/ creat ratio        Ariadna Cox MD  [phone]

## 2018-10-17 NOTE — PROGRESS NOTES
[facility]: MED RECONCILIATION Comments/Recommendations: ? Med rec performed via patient interview while going over patient's medication list from her purse. ? Patient states she recently stopped her potassium tablets due to hyperkalemia per her MD.  
? Patient had her AM medications but did not take her mid-day medications (seroquel, hydralazine, etc) prior to admit ? Patient states she received her vial of Procrit yesterday in the mail but does not plan to begin it until receiving labs from her nephrologist. Patient is unsure of her dose she received but states it was \"1 ml.\"  
 
Medications added: · Bumex · Symbicort · Quetiapine · Losartan · APAP 
· Glucagon · Procrit · Montelukast 
· Ferrous sulfate Medications removed: · Amlodipine--pt states no longer takes · Lasix--pt was changed to bumex · Humalog sliding scale--patient states she only uses lantus and mealtime humalog · prednisone Medications adjusted: 
 
· Changed duonebs to albuterol per patient · Changed lipitor to 20 mg tablets, 2 tablets daily · Changed ergocalciferol to every Saturday · Changed flonase to BID · Changed hydralazine to 50 mg tablets, 2 tablets TID · Changed lantus to 30 units q AM, 20 units q PM as per patient · Changed humalog to 16 units TID AC meals · Changed loratadine to daily · Changed paroxetine to 30 mg po daily Information obtained from: patient, medication list 
 
Significant PMH/Disease States:  
Past Medical History:  
Diagnosis Date  CKD (chronic kidney disease) stage 4, GFR 15-29 ml/min ([facility])  COPD (chronic obstructive pulmonary disease) ([facility])  Diabetes ([facility]  Diastolic heart failure ([facility]  Hypertension  Pulmonary HTN ([facility] Chief Complaint for this Admission: Chief Complaint Patient presents with  Abnormal Lab Results Allergies: Codeine and Lisinopril Prior to Admission Medications:  
 
Medication Documentation Review Audit Reviewed by Pat Gonzalez, PATRICIO (Pharmacist) on 10/17/18 at 4322 Medication Sig Documenting Provider Last Dose Status Taking? albuterol (PROVENTIL HFA, VENTOLIN HFA, PROAIR HFA) 90 mcg/actuation inhaler Take 2 Puffs by inhalation every six (6) hours as needed for Wheezing. Provider, Historical  Active Yes  
albuterol (PROVENTIL VENTOLIN) 2.5 mg /3 mL (0.083 %) nebulizer solution 1.25 mg by Nebulization route every four (4) hours as needed for Wheezing or Shortness of Breath. Provider, Historical  Active Yes  
aspirin delayed-release 81 mg tablet Take 81 mg by mouth daily. Other, MD Magdalena 10/17/2018 AM Active Yes  
atorvastatin (LIPITOR) 20 mg tablet Take 40 mg by mouth daily. Provider, Historical 10/17/2018 AM Active Yes  
budesonide-formoterol (SYMBICORT) 160-4.5 mcg/actuation HFAA Take 2 Puffs by inhalation two (2) times a day. Provider, Historical 10/17/2018 AM Active Yes  
bumetanide (BUMEX) 2 mg tablet Take 2 mg by mouth two (2) times a day. Provider, Historical 10/17/2018 AM Active Yes  
carvedilol (COREG) 25 mg tablet Take 25 mg by mouth two (2) times daily (with meals). Provider, Historical 10/17/2018 AM Active Yes  
epoetin mario (PROCRIT INJECTION) by Injection route every thirty (30) days. Provider, Historical  Active Yes Med Note (Boris Kathleen   Wed Oct 17, 2018  5:39 PM) Note: patient states she received in the mail yesterday; unsure of dose; states she is to obtain labs before receiving   
ergocalciferol (VITAMIN D2) 50,000 unit capsule Take 50,000 Units by mouth Every Saturday. Provider, Historical 10/13/2018 AM Active No  
ferrous sulfate 325 mg (65 mg iron) tablet Take 325 mg by mouth two (2) times daily (with meals). Provider, Historical 10/17/2018 AM Active Yes  
fluticasone (FLONASE ALLERGY RELIEF) 50 mcg/actuation nasal spray 2 Sprays by Both Nostrils route two (2) times a day. Provider, Historical 10/17/2018 AM Active Yes glucagon (GLUCAGEN) 1 mg injection 1 mg by IntraMUSCular route daily as needed for Hypoglycemia. Provider, Historical  Active Yes  
hydrALAZINE (APRESOLINE) 50 mg tablet Take 100 mg by mouth three (3) times daily. Provider, Historical 10/17/2018 AM Active Yes  
insulin glargine (LANTUS U-100 INSULIN) 100 unit/mL injection 20 Units by SubCUTAneous route nightly. Magdalena Gama MD 10/16/2018 PM Active Yes  
insulin glargine (LANTUS U-100 INSULIN) 100 unit/mL injection 30 Units by SubCUTAneous route daily. Provider, Historical 10/17/2018 AM Active Yes  
insulin lispro (HUMALOG U-100 INSULIN) 100 unit/mL injection 16 Units by SubCUTAneous route Before breakfast, lunch, and dinner. Magdalena Gama MD 10/17/2018 AM Active Yes  
loratadine (CLARITIN) 10 mg tablet Take 10 mg by mouth daily. Magdalena Gama MD 10/17/2018 AM Active Yes  
losartan (COZAAR) 50 mg tablet Take 50 mg by mouth daily. Provider, Historical 10/17/2018 AM Active Yes  
montelukast (SINGULAIR) 10 mg tablet Take 10 mg by mouth daily. Provider, Historical 10/17/2018 AM Active Yes PARoxetine (PAXIL) 30 mg tablet Take 30 mg by mouth daily. Provider, Historical 10/17/2018 AM Active Yes QUEtiapine (SEROQUEL) 25 mg tablet Take 25 mg by mouth three (3) times daily. Provider, Historical 10/17/2018 AM Active Yes  
raNITIdine (ZANTAC) 150 mg tablet Take 150 mg by mouth two (2) times a day. Provider, Historical 10/17/2018 AM Active Yes Alexandra Loya PHARMD   Contact: [phone]

## 2018-10-17 NOTE — ED TRIAGE NOTES
Per pt, her nephrologist advised her to come to the ED \"a couple weeks\" ago. Per pt, her HGB is 6.7 Pt does have CKD but is not a dialysis pt. Pt denies noticing any blood in her stool, denies vomiting blood.

## 2018-10-18 ENCOUNTER — APPOINTMENT (OUTPATIENT)
Dept: INFUSION THERAPY | Age: 63
End: 2018-10-18
Payer: MEDICARE

## 2018-10-18 VITALS
SYSTOLIC BLOOD PRESSURE: 119 MMHG | HEART RATE: 84 BPM | WEIGHT: 269 LBS | BODY MASS INDEX: 44.82 KG/M2 | OXYGEN SATURATION: 100 % | TEMPERATURE: 98.9 F | RESPIRATION RATE: 18 BRPM | DIASTOLIC BLOOD PRESSURE: 53 MMHG | HEIGHT: 65 IN

## 2018-10-18 LAB
ABO + RH BLD: NORMAL
ALBUMIN SERPL-MCNC: 2.8 G/DL (ref 3.5–5)
ANION GAP SERPL CALC-SCNC: 10 MMOL/L (ref 5–15)
BLD PROD TYP BPU: NORMAL
BLOOD GROUP ANTIBODIES SERPL: NORMAL
BPU ID: NORMAL
BUN SERPL-MCNC: 63 MG/DL (ref 6–20)
BUN/CREAT SERPL: 17 (ref 12–20)
CALCIUM SERPL-MCNC: 7.9 MG/DL (ref 8.5–10.1)
CHLORIDE SERPL-SCNC: 103 MMOL/L (ref 97–108)
CO2 SERPL-SCNC: 28 MMOL/L (ref 21–32)
CREAT SERPL-MCNC: 3.75 MG/DL (ref 0.55–1.02)
CROSSMATCH RESULT,%XM: NORMAL
ERYTHROCYTE [DISTWIDTH] IN BLOOD BY AUTOMATED COUNT: 14.9 % (ref 11.5–14.5)
FERRITIN SERPL-MCNC: 95 NG/ML (ref 8–252)
FOLATE SERPL-MCNC: 17.2 NG/ML (ref 5–21)
GLUCOSE BLD STRIP.AUTO-MCNC: 162 MG/DL (ref 65–100)
GLUCOSE BLD STRIP.AUTO-MCNC: 205 MG/DL (ref 65–100)
GLUCOSE BLD STRIP.AUTO-MCNC: 299 MG/DL (ref 65–100)
GLUCOSE SERPL-MCNC: 176 MG/DL (ref 65–100)
HCT VFR BLD AUTO: 24.4 % (ref 35–47)
HGB BLD-MCNC: 7.5 G/DL (ref 11.5–16)
IRON SATN MFR SERPL: 24 % (ref 20–50)
IRON SERPL-MCNC: 41 UG/DL (ref 35–150)
MCH RBC QN AUTO: 25.9 PG (ref 26–34)
MCHC RBC AUTO-ENTMCNC: 30.7 G/DL (ref 30–36.5)
MCV RBC AUTO: 84.1 FL (ref 80–99)
NRBC # BLD: 0 K/UL (ref 0–0.01)
NRBC BLD-RTO: 0 PER 100 WBC
PHOSPHATE SERPL-MCNC: 5.3 MG/DL (ref 2.6–4.7)
PLATELET # BLD AUTO: 213 K/UL (ref 150–400)
PMV BLD AUTO: 11.9 FL (ref 8.9–12.9)
POTASSIUM SERPL-SCNC: 4 MMOL/L (ref 3.5–5.1)
RBC # BLD AUTO: 2.9 M/UL (ref 3.8–5.2)
SERVICE CMNT-IMP: ABNORMAL
SODIUM SERPL-SCNC: 141 MMOL/L (ref 136–145)
SPECIMEN EXP DATE BLD: NORMAL
STATUS OF UNIT,%ST: NORMAL
T4 FREE SERPL-MCNC: 0.9 NG/DL (ref 0.8–1.5)
TIBC SERPL-MCNC: 172 UG/DL (ref 250–450)
TSH SERPL DL<=0.05 MIU/L-ACNC: 0.83 UIU/ML (ref 0.36–3.74)
UNIT DIVISION, %UDIV: 0
VIT B12 SERPL-MCNC: 803 PG/ML (ref 193–986)
WBC # BLD AUTO: 12.2 K/UL (ref 3.6–11)

## 2018-10-18 PROCEDURE — 74011250636 HC RX REV CODE- 250/636: Performed by: INTERNAL MEDICINE

## 2018-10-18 PROCEDURE — 82728 ASSAY OF FERRITIN: CPT | Performed by: INTERNAL MEDICINE

## 2018-10-18 PROCEDURE — 74011250637 HC RX REV CODE- 250/637: Performed by: INTERNAL MEDICINE

## 2018-10-18 PROCEDURE — 84439 ASSAY OF FREE THYROXINE: CPT | Performed by: INTERNAL MEDICINE

## 2018-10-18 PROCEDURE — 96372 THER/PROPH/DIAG INJ SC/IM: CPT

## 2018-10-18 PROCEDURE — 82746 ASSAY OF FOLIC ACID SERUM: CPT | Performed by: INTERNAL MEDICINE

## 2018-10-18 PROCEDURE — 82962 GLUCOSE BLOOD TEST: CPT

## 2018-10-18 PROCEDURE — 85027 COMPLETE CBC AUTOMATED: CPT | Performed by: INTERNAL MEDICINE

## 2018-10-18 PROCEDURE — 83540 ASSAY OF IRON: CPT | Performed by: INTERNAL MEDICINE

## 2018-10-18 PROCEDURE — 84443 ASSAY THYROID STIM HORMONE: CPT | Performed by: INTERNAL MEDICINE

## 2018-10-18 PROCEDURE — 82607 VITAMIN B-12: CPT | Performed by: INTERNAL MEDICINE

## 2018-10-18 PROCEDURE — 99218 HC RM OBSERVATION: CPT

## 2018-10-18 PROCEDURE — 74011636637 HC RX REV CODE- 636/637: Performed by: INTERNAL MEDICINE

## 2018-10-18 PROCEDURE — 36415 COLL VENOUS BLD VENIPUNCTURE: CPT | Performed by: INTERNAL MEDICINE

## 2018-10-18 PROCEDURE — 80069 RENAL FUNCTION PANEL: CPT | Performed by: INTERNAL MEDICINE

## 2018-10-18 RX ORDER — ACETAMINOPHEN 325 MG/1
650 TABLET ORAL ONCE
Status: ACTIVE | OUTPATIENT
Start: 2018-10-19 | End: 2018-10-19

## 2018-10-18 RX ORDER — DIPHENHYDRAMINE HCL 25 MG
25 CAPSULE ORAL ONCE
Status: ACTIVE | OUTPATIENT
Start: 2018-10-19 | End: 2018-10-19

## 2018-10-18 RX ORDER — RANITIDINE 150 MG/1
150 TABLET, FILM COATED ORAL DAILY
Qty: 1 TAB | Refills: 0 | Status: SHIPPED
Start: 2018-10-18

## 2018-10-18 RX ADMIN — INSULIN LISPRO 5 UNITS: 100 INJECTION, SOLUTION INTRAVENOUS; SUBCUTANEOUS at 12:45

## 2018-10-18 RX ADMIN — QUETIAPINE FUMARATE 25 MG: 25 TABLET ORAL at 16:21

## 2018-10-18 RX ADMIN — LOSARTAN POTASSIUM 50 MG: 50 TABLET ORAL at 09:49

## 2018-10-18 RX ADMIN — LORATADINE 10 MG: 10 TABLET ORAL at 09:48

## 2018-10-18 RX ADMIN — CARVEDILOL 25 MG: 12.5 TABLET, FILM COATED ORAL at 16:21

## 2018-10-18 RX ADMIN — HYDRALAZINE HYDROCHLORIDE 100 MG: 25 TABLET, FILM COATED ORAL at 16:21

## 2018-10-18 RX ADMIN — INSULIN LISPRO 3 UNITS: 100 INJECTION, SOLUTION INTRAVENOUS; SUBCUTANEOUS at 16:20

## 2018-10-18 RX ADMIN — FLUTICASONE PROPIONATE 2 SPRAY: 50 SPRAY, METERED NASAL at 10:06

## 2018-10-18 RX ADMIN — ERYTHROPOIETIN 40000 UNITS: 40000 INJECTION, SOLUTION INTRAVENOUS; SUBCUTANEOUS at 11:26

## 2018-10-18 RX ADMIN — Medication 10 ML: at 07:22

## 2018-10-18 RX ADMIN — PAROXETINE HYDROCHLORIDE 30 MG: 20 TABLET, FILM COATED ORAL at 09:48

## 2018-10-18 RX ADMIN — DOCUSATE SODIUM 100 MG: 100 CAPSULE, LIQUID FILLED ORAL at 09:48

## 2018-10-18 RX ADMIN — FLUTICASONE PROPIONATE 2 SPRAY: 50 SPRAY, METERED NASAL at 17:29

## 2018-10-18 RX ADMIN — FERROUS SULFATE TAB 325 MG (65 MG ELEMENTAL FE) 325 MG: 325 (65 FE) TAB at 08:16

## 2018-10-18 RX ADMIN — QUETIAPINE FUMARATE 25 MG: 25 TABLET ORAL at 09:49

## 2018-10-18 RX ADMIN — INSULIN LISPRO 2 UNITS: 100 INJECTION, SOLUTION INTRAVENOUS; SUBCUTANEOUS at 08:16

## 2018-10-18 RX ADMIN — BUMETANIDE 2 MG: 1 TABLET ORAL at 17:28

## 2018-10-18 RX ADMIN — BUMETANIDE 2 MG: 1 TABLET ORAL at 09:48

## 2018-10-18 RX ADMIN — MONTELUKAST SODIUM 10 MG: 10 TABLET, COATED ORAL at 09:49

## 2018-10-18 RX ADMIN — FERROUS SULFATE TAB 325 MG (65 MG ELEMENTAL FE) 325 MG: 325 (65 FE) TAB at 16:22

## 2018-10-18 RX ADMIN — CARVEDILOL 25 MG: 12.5 TABLET, FILM COATED ORAL at 08:15

## 2018-10-18 RX ADMIN — INSULIN GLARGINE 20 UNITS: 100 INJECTION, SOLUTION SUBCUTANEOUS at 09:49

## 2018-10-18 RX ADMIN — HYDRALAZINE HYDROCHLORIDE 100 MG: 25 TABLET, FILM COATED ORAL at 09:48

## 2018-10-18 RX ADMIN — DOCUSATE SODIUM 100 MG: 100 CAPSULE, LIQUID FILLED ORAL at 17:28

## 2018-10-18 RX ADMIN — Medication 10 ML: at 14:00

## 2018-10-18 RX ADMIN — ATORVASTATIN CALCIUM 40 MG: 20 TABLET, FILM COATED ORAL at 09:48

## 2018-10-18 NOTE — CONSULTS
[address]  MR#: [id number]  : 1955  ACCOUNT #: [de-identified]   DATE OF SERVICE: 10/17/2018    ATTENDING PHYSICIAN:  Dr. Keli Valentine:  1.  CKD, stage 4.  2.  Anemia, presumably related to renal failure. 3.  Adult-onset diabetes mellitus. HISTORY OF PRESENT ILLNESS:  Thank you for allowing me to see this patient. This is a 79-year-old black female who is followed by Dr. Jerman Pfeiffer, my colleague. She has CKD, stage 4. She was most recently seen in the office in 2018. About a week ago, she had a blood transfusion. Apparently, this has not helped \"fatigue\" and redraw of the labs showed persistent anemia. She was sent to the emergency room for evaluation. Per Dr. Ginny Stewart notes, she was at [facility] earlier this year. She was treated for pneumonia and decompensated heart failure. She underwent a cardiac workup including echocardiogram and stress test, but did not require a cardiac catheterization. Moreover, she has also had an EGD and colonoscopy within the last year because of persistent anemia. She was recently diagnosed with sleep apnea, but has not obtained a CPAP machine yet. PAST MEDICAL HISTORY:  Remarkable for chronic kidney disease related to diabetes. She has diabetic retinopathy, history of hypertension, history of coronary artery disease, status post STEMI; obstructive sleep apnea and CPAP has been prescribed, history of heart failure, hyperlipidemia, previous CVA with left hemiparesis, which is better; anemia related to CKD. PAST SURGICAL HISTORY:  She is status post tonsillectomy, endometrial ablation, cholecystectomy, and vitrectomy. ALLERGIES:  SHE IS ALLERGIC TO LISINOPRIL.     MEDICATIONS:  Per our office records in August, her medications were as follows:  Amlodipine 5 mg a day, albuterol inhaler as needed, aspirin 81 mg daily, atenolol 100 mg at night, atorvastatin 20 mg a day, bumetanide 2 mg a day, ergocalciferol 50,000 units weekly, iron sulfate 325 mg daily, hydralazine 100 mg t.i.d., Lantus twice daily, 40 units in the morning, 30 in the evening; losartan 50 mg at bedtime, montelukast 10 mg daily, paroxetine 30 mg daily,  potassium chloride 20 mEq twice a day, quetiapine 25 mg t.i.d., ranitidine 150 mg twice a day, Symbicort as needed. SOCIAL HISTORY:  Negative for alcohol and tobacco use. FAMILY HISTORY:  Negative for kidney disease, but hypertension and heart failure is present. REVIEW OF SYSTEMS:  Positive for fatigue. She specifically denies hemoptysis, hematemesis, blood in the urine, blood in the stool, although she has noticed some dark stools. Apparently, Hemoccult testing today was negative. She denies visual changes. No sore throat or problems with hearing. No sinusitis. No chest pain, shortness of breath at rest, but she does have dyspnea on exertion. She has worsening lower extremity edema. She has a nonproductive cough at times, but no hemoptysis, no wheezing. No abdominal pain. No nausea or vomiting. No diarrhea. Specifically denies bright red blood or tarry stools. No hematuria. No difficulty initiating or terminating urinary stream, but she has noticed a decrease in urinary volume. No pain. No weakness. No DJD symptoms. No skin changes. No heat or cold intolerance. No lymphadenopathy. She has had lower extremity edema. No changes in mental status, syncope, or alertness. PHYSICAL EXAMINATION:  GENERAL:  She is an obese 53-year-old black female who looks remarkably older than her stated age. VITAL SIGNS:  Blood pressure is 154/63, pulse 78, O2 sat is 98%. HEENT:  Normocephalic, atraumatic cranium. Conjunctivae and sclerae are clear. Tongue is midline and moist.  NECK:  Supple. No JVD. LUNGS:  Clear. HEART:  Shows a regular rhythm, without S3 gallop. There is no rub. ABDOMEN:  Soft. Normoactive bowel sounds.   No organomegaly. No costovertebral angle tenderness. EXTREMITIES:  Show +1 bilateral pretibial edema. No skin lesions. NEUROLOGIC:  Nonfocal.    PSYCHIATRIC:  Alert and oriented to person, place, and time. She has good insight, is conversant with fluent speech. LABORATORY DATA:  Show a hematocrit of 22.6% with a hemoglobin of 6.8 and white count of 12.2, platelet count of 608,545. Sodium 139, potassium 4.7, chloride 102, CO2 of 29, glucose 183, BUN and creatinine 66 and 3.64, calcium 8.2, magnesium 1.8, albumin 3.1, SGOT is 19, ALT is 17. Stool test today is negative for occult blood. Urinalysis shows no blood and previous urinalysis studies done in May of this year. IMPRESSION:  1. Chronic kidney disease, stage 4, by history. 2.  Adult-onset diabetes mellitus with retinopathy and presumed nephropathy. However, the amount of protein in urine appears to be less than nephrotic range. 3.  Anemia, etiology is not clear. Her degree of anemia requiring multiple blood transfusions seems to be too low for renal failure alone. However, previous gastrointestinal workups apparently have been negative. She has not been seen by Hematology. It seems that she has had a workup for anemia before at [facility], although I am not clear what was found. PLAN:  1. We will get a urine for protein, creatinine. 2.  Check iron saturation and she is iron depleted. We will give her some IV iron. Apparently, she has had a prescription for Procrit and got some recently, but has not started using it yet. If she is iron deficient, Procrit will not be effective. 3.  She is clearly volume overloaded. We will continue with Bumex twice a day as ordered by Dr. Nini Perkins. She will continue her other medications. I will stop oral iron particularly if she is iron deficient by labs. If that is the case, we should just give her IV iron. Oral iron absorption is problematic in people with chronic kidney disease.   If we will use oral iron, we probably should use some vitamin C as an adjunct to increase absorption. Further recommendation will follow. Thank you for allowing me to see this patient.       MD ANGELICA Vang / FABIO  D: 10/17/2018 18:28     T: 10/17/2018 21:11  JOB #: [id number]

## 2018-10-18 NOTE — PROGRESS NOTES
GI Note Patient followed by [facility]. She had recent EGD/Colon with Dr. Loren Jon in June 2018. I have notified their group of consult. Will sign off.  
 
Hannah Prieto PA-C 
10/18/18 
8:51 AM 
Brenda Henao MD

## 2018-10-18 NOTE — CONSULTS
[address] [place] [address]. [address] [place] [address] NP  [phone]                    GASTROENTEROLOGY CONSULTATION NOTE              NAME:  Ramón Rajput   :   1955   MRN:   [id number]       Referring Physician:    Dr. Fany Allen Date:   10/18/2018 10:44 AM    Chief Complaint:    Anemia     History of Present Illness:    Patient is a 58 y.o. who we were asked to see in consultation for the above complaint. She has history of CKD and has been seeing an nephrologist as an outpt who has been monitoring her hemoglobin. She was found to have a low hgb at her last check and she was referred to the hospital for renal consult. Her hemoglobin on arrival was 7.5 - normocytic normochromic. She endorses dark stools while being on an iron supplement. Denies abdominal pain, constipation and diarrhea. .  Admits to feeling more tired than usual and feeling bloated. Does not take blood thinners or use NSAIDs. Last EGD 2018 - normal.  Last colonoscopy 2018 - external hemorrhoids otherwise normal.      PMH:  Past Medical History:   Diagnosis Date    CKD (chronic kidney disease) stage 4, GFR 15-29 ml/min (HCC)     COPD (chronic obstructive pulmonary disease) (HCC)     Diabetes (HCC)     Diastolic heart failure (HCC)     Hypertension     Pulmonary HTN (HCC)        PSH:  Past Surgical History:   Procedure Laterality Date    HX CHOLECYSTECTOMY      HX GYN          HX HEENT      tonsillectomy       Allergies: Allergies   Allergen Reactions    Codeine Hives    Lisinopril Hives       Home Medications:  Prior to Admission Medications   Prescriptions Last Dose Informant Patient Reported? Taking? PARoxetine (PAXIL) 30 mg tablet 10/17/2018 at AM Self Yes Yes   Sig: Take 30 mg by mouth daily. QUEtiapine (SEROQUEL) 25 mg tablet 10/17/2018 at AM Self Yes Yes   Sig: Take 25 mg by mouth three (3) times daily.    acetaminophen (TYLENOL) 500 mg tablet   Yes Yes   Sig: Take 500 mg by mouth every six (6) hours as needed for Pain. albuterol (PROVENTIL HFA, VENTOLIN HFA, PROAIR HFA) 90 mcg/actuation inhaler  Self Yes Yes   Sig: Take 2 Puffs by inhalation every six (6) hours as needed for Wheezing. albuterol (PROVENTIL VENTOLIN) 2.5 mg /3 mL (0.083 %) nebulizer solution  Self Yes Yes   Si.25 mg by Nebulization route every four (4) hours as needed for Wheezing or Shortness of Breath. aspirin delayed-release 81 mg tablet 10/17/2018 at AM Self Yes Yes   Sig: Take 81 mg by mouth daily. atorvastatin (LIPITOR) 20 mg tablet 10/17/2018 at AM Self Yes Yes   Sig: Take 40 mg by mouth daily. budesonide-formoterol (SYMBICORT) 160-4.5 mcg/actuation HFAA 10/17/2018 at AM Self Yes Yes   Sig: Take 2 Puffs by inhalation two (2) times a day. bumetanide (BUMEX) 2 mg tablet 10/17/2018 at AM Self Yes Yes   Sig: Take 2 mg by mouth two (2) times a day. carvedilol (COREG) 25 mg tablet 10/17/2018 at AM Self Yes Yes   Sig: Take 25 mg by mouth two (2) times daily (with meals). epoetin mario (PROCRIT INJECTION)  Self Yes Yes   Sig: by Injection route every thirty (30) days. ergocalciferol (VITAMIN D2) 50,000 unit capsule 10/13/2018 at AM Self Yes No   Sig: Take 50,000 Units by mouth Every Saturday. ferrous sulfate 325 mg (65 mg iron) tablet 10/17/2018 at AM Self Yes Yes   Sig: Take 325 mg by mouth two (2) times daily (with meals). fluticasone (FLONASE ALLERGY RELIEF) 50 mcg/actuation nasal spray 10/17/2018 at AM Self Yes Yes   Si Sprays by Both Nostrils route two (2) times a day. glucagon (GLUCAGEN) 1 mg injection  Self Yes Yes   Si mg by IntraMUSCular route daily as needed for Hypoglycemia. hydrALAZINE (APRESOLINE) 50 mg tablet 10/17/2018 at AM Self Yes Yes   Sig: Take 100 mg by mouth three (3) times daily. insulin glargine (LANTUS U-100 INSULIN) 100 unit/mL injection 10/16/2018 at PM Self Yes Yes   Si Units by SubCUTAneous route nightly.    insulin glargine (LANTUS U-100 INSULIN) 100 unit/mL injection 10/17/2018 at AM Self Yes Yes   Si Units by SubCUTAneous route daily. insulin lispro (HUMALOG U-100 INSULIN) 100 unit/mL injection 10/17/2018 at AM Self Yes Yes   Si Units by SubCUTAneous route Before breakfast, lunch, and dinner. loratadine (CLARITIN) 10 mg tablet 10/17/2018 at AM Self Yes Yes   Sig: Take 10 mg by mouth daily. losartan (COZAAR) 50 mg tablet 10/17/2018 at AM Self Yes Yes   Sig: Take 50 mg by mouth daily. montelukast (SINGULAIR) 10 mg tablet 10/17/2018 at AM Self Yes Yes   Sig: Take 10 mg by mouth daily. raNITIdine (ZANTAC) 150 mg tablet 10/17/2018 at AM  Yes Yes   Sig: Take 150 mg by mouth two (2) times a day.       Facility-Administered Medications: None       Hospital Medications:  Current Facility-Administered Medications   Medication Dose Route Frequency    epoetin mario (EPOGEN;PROCRIT) injection 40,000 Units  40,000 Units SubCUTAneous ONCE    0.9% sodium chloride infusion 250 mL  250 mL IntraVENous PRN    sodium chloride (NS) flush 5-10 mL  5-10 mL IntraVENous Q8H    sodium chloride (NS) flush 5-10 mL  5-10 mL IntraVENous PRN    acetaminophen (TYLENOL) tablet 650 mg  650 mg Oral Q4H PRN    ondansetron (ZOFRAN) injection 4 mg  4 mg IntraVENous Q4H PRN    docusate sodium (COLACE) capsule 100 mg  100 mg Oral BID    insulin lispro (HUMALOG) injection   SubCUTAneous AC&HS    glucose chewable tablet 16 g  4 Tab Oral PRN    dextrose (D50W) injection syrg 12.5-25 g  25-50 mL IntraVENous PRN    glucagon (GLUCAGEN) injection 1 mg  1 mg IntraMUSCular PRN    albuterol (PROVENTIL VENTOLIN) nebulizer solution 2.5 mg  2.5 mg Nebulization Q2H PRN    albuterol (PROVENTIL VENTOLIN) nebulizer solution 1.25 mg  1.25 mg Nebulization Q4H PRN    atorvastatin (LIPITOR) tablet 40 mg  40 mg Oral DAILY    bumetanide (BUMEX) tablet 2 mg  2 mg Oral BID    carvedilol (COREG) tablet 25 mg  25 mg Oral BID WITH MEALS    ferrous sulfate tablet 325 mg  325 mg Oral BID WITH MEALS    hydrALAZINE (APRESOLINE) tablet 100 mg  100 mg Oral TID    fluticasone (FLONASE) 50 mcg/actuation nasal spray 2 Spray  2 Spray Both Nostrils BID    loratadine (CLARITIN) tablet 10 mg  10 mg Oral DAILY    losartan (COZAAR) tablet 50 mg  50 mg Oral DAILY    montelukast (SINGULAIR) tablet 10 mg  10 mg Oral DAILY    PARoxetine (PAXIL) tablet 30 mg  30 mg Oral DAILY    QUEtiapine (SEROquel) tablet 25 mg  25 mg Oral TID    albuterol-ipratropium (DUO-NEB) 2.5 MG-0.5 MG/3 ML  3 mL Nebulization Q4H PRN    insulin glargine (LANTUS) injection 10 Units  10 Units SubCUTAneous QHS    insulin glargine (LANTUS) injection 20 Units  20 Units SubCUTAneous DAILY     Facility-Administered Medications Ordered in Other Encounters   Medication Dose Route Frequency    [START ON 10/19/2018] acetaminophen (TYLENOL) tablet 650 mg  650 mg Oral ONCE    [START ON 10/19/2018] diphenhydrAMINE (BENADRYL) capsule 25 mg  25 mg Oral ONCE       Social History:  Social History     Tobacco Use    Smoking status: Never Smoker    Smokeless tobacco: Never Used   Substance Use Topics    Alcohol use: No       Family History:  Family History   Problem Relation Age of Onset    Hypertension Mother     Heart Failure Mother        Review of Systems:  Constitutional: negative fever, negative chills, negative weight loss  Eyes:   negative visual changes  ENT:   negative sore throat, tongue or lip swelling  Respiratory:  negative cough, negative dyspnea  Cards:  negative for chest pain, palpitations, lower extremity edema  GI:   See HPI  :  negative for frequency, dysuria  Integument:  negative for rash and pruritus  Heme:  negative for easy bruising and gum/nose bleeding  Musculoskel: negative for myalgias,  back pain and muscle weakness  Neuro: negative for headaches, dizziness, vertigo  Psych:  negative for feelings of anxiety, depression     Objective:     Patient Vitals for the past 8 hrs:   BP Temp Pulse Resp SpO2   10/18/18 0729 152/64 98.9 °F (37.2 °C) 89 18 97 %   10/18/18 0303 104/52 98.4 °F (36.9 °C) 80 16 99 %     No intake/output data recorded. 10/16 1901 - 10/18 0700  In: 310   Out: -     EXAM:     NEURO-alert, oriented x3, affect appropriate   HEENT-Head: Normocephalic, no lesions, without obvious abnormality. LUNGS-clear to auscultation bilaterally    COR-regular rate and rhythym     ABD- soft, distended, non-tender. Bowel sounds normal. No masses,  no organomegaly     EXT-no edema    Skin - No rash     Data Review     Recent Labs     10/18/18  0323 10/17/18  1423   WBC 12.2* 12.2*   HGB 7.5* 6.8*   HCT 24.4* 22.6*    232     Recent Labs     10/18/18  0323 10/17/18  1423    139   K 4.0 4.7    102   CO2 28 29   BUN 63* 66*   CREA 3.75* 3.64*   * 183*   PHOS 5.3*  --    CA 7.9* 8.2*     Recent Labs     10/18/18  0323 10/17/18  1423   SGOT  --  19   AP  --  99   TP  --  7.1   ALB 2.8* 3.1*   GLOB  --  4.0     Recent Labs     10/17/18  1535   INR 1.0   PTP 10.5       Patient Active Problem List   Diagnosis Code    Sepsis ([facility] [address] A41.9    Anemia due to chronic kidney disease N18.9, D63.1    Hypertension I10    COPD (chronic obstructive pulmonary disease) ([facility]) J44.9    CKD (chronic kidney disease) stage 4, GFR 15-29 ml/min (HCC) N18.4    DM type 2 causing CKD stage 4 (HCC) E11.22, N18.4    (HFpEF) heart failure with preserved ejection fraction ([facility]) I50.30       Assessment and Plan:  Anemia:   - Trend Hemoglobin and transfuse to goal of 7.  - NPO at midnight for Capsule endoscopy tomorrow. - Continue Iron supplementation. Thanks for allowing me to participate in the care of this patient.   Signed By: Sara Starr NP     10/18/2018  10:44 AM

## 2018-10-18 NOTE — PROGRESS NOTES
[facility] KIDNEY Renal Daily Progress Note:  
 
Admission Date: 10/17/2018 Subjective: feels good post transfusion. Fe sat 24%. Not SOB, no CP, abd pain or N/V. Edema lower ext persists Review of Systems Pertinent items are noted in HPI. Objective:  
 
Visit Vitals /64 (BP 1 Location: Left arm, BP Patient Position: At rest) Pulse 89 Temp 98.9 °F (37.2 °C) Resp 18 Ht 5' 5\" (1.651 m) Wt 122 kg (269 lb) SpO2 97% Breastfeeding? No  
BMI 44.76 kg/m² Temp (24hrs), Av.3 °F (36.8 °C), Min:98.1 °F (36.7 °C), Max:98.9 °F (37.2 °C) Intake/Output Summary (Last 24 hours) at 10/18/2018 1006 Last data filed at 10/17/2018 1954 Gross per 24 hour Intake 310 ml Output  Net 310 ml  
 
Current Facility-Administered Medications Medication Dose Route Frequency  0.9% sodium chloride infusion 250 mL  250 mL IntraVENous PRN  
 sodium chloride (NS) flush 5-10 mL  5-10 mL IntraVENous Q8H  
 sodium chloride (NS) flush 5-10 mL  5-10 mL IntraVENous PRN  
 acetaminophen (TYLENOL) tablet 650 mg  650 mg Oral Q4H PRN  
 ondansetron (ZOFRAN) injection 4 mg  4 mg IntraVENous Q4H PRN  
 docusate sodium (COLACE) capsule 100 mg  100 mg Oral BID  insulin lispro (HUMALOG) injection   SubCUTAneous AC&HS  
 glucose chewable tablet 16 g  4 Tab Oral PRN  
 dextrose (D50W) injection syrg 12.5-25 g  25-50 mL IntraVENous PRN  
 glucagon (GLUCAGEN) injection 1 mg  1 mg IntraMUSCular PRN  
 albuterol (PROVENTIL VENTOLIN) nebulizer solution 2.5 mg  2.5 mg Nebulization Q2H PRN  
 albuterol (PROVENTIL VENTOLIN) nebulizer solution 1.25 mg  1.25 mg Nebulization Q4H PRN  
 atorvastatin (LIPITOR) tablet 40 mg  40 mg Oral DAILY  bumetanide (BUMEX) tablet 2 mg  2 mg Oral BID  carvedilol (COREG) tablet 25 mg  25 mg Oral BID WITH MEALS  ferrous sulfate tablet 325 mg  325 mg Oral BID WITH MEALS  
 hydrALAZINE (APRESOLINE) tablet 100 mg  100 mg Oral TID  fluticasone (FLONASE) 50 mcg/actuation nasal spray 2 Spray  2 Spray Both Nostrils BID  loratadine (CLARITIN) tablet 10 mg  10 mg Oral DAILY  losartan (COZAAR) tablet 50 mg  50 mg Oral DAILY  montelukast (SINGULAIR) tablet 10 mg  10 mg Oral DAILY  PARoxetine (PAXIL) tablet 30 mg  30 mg Oral DAILY  QUEtiapine (SEROquel) tablet 25 mg  25 mg Oral TID  albuterol-ipratropium (DUO-NEB) 2.5 MG-0.5 MG/3 ML  3 mL Nebulization Q4H PRN  
 insulin glargine (LANTUS) injection 10 Units  10 Units SubCUTAneous QHS  insulin glargine (LANTUS) injection 20 Units  20 Units SubCUTAneous DAILY Facility-Administered Medications Ordered in Other Encounters Medication Dose Route Frequency  [START ON 10/19/2018] acetaminophen (TYLENOL) tablet 650 mg  650 mg Oral ONCE  
 [START ON 10/19/2018] diphenhydrAMINE (BENADRYL) capsule 25 mg  25 mg Oral ONCE Physical Exam:General appearance: alert, cooperative, no distress, appears stated age Neck: supple, symmetrical, trachea midline, no adenopathy, thyroid: not enlarged, symmetric, no tenderness/mass/nodules and no JVD Lungs: clear to auscultation bilaterally Heart: regular rate and rhythm, no S3 or S4, no rub Abdomen: soft, non-tender. Bowel sounds normal. No masses,  no organomegaly Extremities: edema -pre tibial and ankles Neurologic: Grossly normal 
 
Data Review:  
 
LABS: 
Recent Labs 10/18/18 
0323 10/17/18 
1423  139  
K 4.0 4.7  102 CO2 28 29 BUN 63* 66* CREA 3.75* 3.64* CA 7.9* 8.2* ALB 2.8* 3.1*  
PHOS 5.3*  --   
MG  --  1.8 Fe sat 24% Recent Labs 10/18/18 
0323 10/17/18 
1423 WBC 12.2* 12.2* HGB 7.5* 6.8* HCT 24.4* 22.6*  
 232 Recent Labs 10/17/18 
1903 CREAU 27.85 Urine prot/ creat ratio 1.3 Assessment:  
Renal Specific Problems CKD 4 Anemia with renal failure, Fe Sat ok AODM with mild- non nephrotic proteinuria- likely diabetic nephropathy Hypertension with renal failure Volume overload Plan:  
 
Obtain/ Order: labs/cultures/radiology/procedures:  Serial Hgb Therapeutic:   
Epogen 40K today, likely give twice a month on d/c Van Oropeza MD   
[phone]

## 2018-10-18 NOTE — PROGRESS NOTES
Patient washed up and got dressed, cab has been called, discharge instructions have been explained and patient is aware of appt in endo outpt tomorrow morning 10/19/18, questions have been answered, IV has been pulled, cath intact. Waiting to be wheeled down to cab

## 2018-10-18 NOTE — PROGRESS NOTES
Medical Progress Note NAME: Joni Eid :  1955 MRM:  [id number] Date/Time: 10/18/2018  8:46 AM 
  
Assessment / Plan: Anemia due to chronic kidney disease (10/17/2018): Patient has been chronically anemic. HGB in May of this year was 7.3-7.4. Hemoccult done is negative in ED. Suspect dark colored stools are due to iron supplementation. Suspect chronic anemia due to CKD. Has procrit at home, but has not yet started it. Had EGD and colonoscopy in the past 2-3 years without etiology for anemia. HGB improved post transfusion. -- GI consult -- continue iron 
 -- likely need to start procrit that patient has at home 
  
CKD (chronic kidney disease) stage 4, GFR 15-29 ml/min ([facility]) ():  Followed by Dr. Esthela Cabello. Unclear baseline. No urgent needs for HD. Appreciate nephrology consult. -- will need to continue outpatient follow up 
  
Hypertension ():  BP controlled. -- continue coreg, hydralazine, amlodipine, and losartan 
  
COPD (chronic obstructive pulmonary disease) ([facility]) ():  Reports chronic dyspnea and this is currently at baseline. -- nebs prn 
  
DM type 2 causing CKD stage 4 ([facility] (10/17/2018):  well controlled. -- continue long acting insulin and hold scheduled mealtime insulin -- continue SSI 
  
(HFpEF) heart failure with preserved ejection fraction ([facility] (10/17/2018): -- continue bumex 
-- daily weight 
 
**5:01 PM 
Spoke with Dr. Nader Perez with [facility]. Patient has had work up as outpatient and will continue the work up. Will consider capsule study if not improved with treating anemia due to CKD. Spoke with Dr. Maurice Carson and capsule study will be cancelled for tomorrow in favor of outpatient if deemed necessary. Patent advised of above and agrees. Subjective: Chief Complaint:  Feels better. Slept well and feels rested. Not as tired this AM. 
 
ROS: 
(bold if positive, if negative) Objective:  
 
 
Vitals: Last 24hrs VS reviewed since prior progress note. Most recent are: 
 
Visit Vitals /64 (BP 1 Location: Left arm, BP Patient Position: At rest) Pulse 89 Temp 98.9 °F (37.2 °C) Resp 18 Ht 5' 5\" (1.651 m) Wt 122 kg (269 lb) SpO2 97% BMI 44.76 kg/m² SpO2 Readings from Last 6 Encounters:  
10/18/18 97% 10/11/18 98% 10/09/18 100% 05/30/18 97% Intake/Output Summary (Last 24 hours) at 10/18/2018 9527 Last data filed at 10/17/2018 1955 Gross per 24 hour Intake 310 ml Output  Net 310 ml Exam:  
 
Physical Exam: 
 
Gen:  Well-developed, well-nourished, in no acute distress HEENT:  hearing intact to voice, moist mucous membranes Resp:  No accessory muscle use, clear breath sounds without wheezes rales or rhonchi 
Card:  No murmurs, normal S1, S2 without thrills or peripheral edema Abd:  Soft, non-tender, non-distended, normoactive bowel sounds are present Skin:  No rashes Neuro: Face symmetric, tongue midline, speech fluent,  strength is 5/5 bilaterally, follows commands appropriately Psych:  Good insight, oriented to person, place and time, alert Medications Reviewed: (see below) Lab Data Reviewed: (see below) 
 
______________________________________________________________________ Medications:  
 
Current Facility-Administered Medications Medication Dose Route Frequency  0.9% sodium chloride infusion 250 mL  250 mL IntraVENous PRN  
 sodium chloride (NS) flush 5-10 mL  5-10 mL IntraVENous Q8H  
 sodium chloride (NS) flush 5-10 mL  5-10 mL IntraVENous PRN  
 acetaminophen (TYLENOL) tablet 650 mg  650 mg Oral Q4H PRN  
 ondansetron (ZOFRAN) injection 4 mg  4 mg IntraVENous Q4H PRN  
 docusate sodium (COLACE) capsule 100 mg  100 mg Oral BID  insulin lispro (HUMALOG) injection   SubCUTAneous AC&HS  
 glucose chewable tablet 16 g  4 Tab Oral PRN  
 dextrose (D50W) injection syrg 12.5-25 g  25-50 mL IntraVENous PRN  
  glucagon (GLUCAGEN) injection 1 mg  1 mg IntraMUSCular PRN  
 albuterol (PROVENTIL VENTOLIN) nebulizer solution 2.5 mg  2.5 mg Nebulization Q2H PRN  
 albuterol (PROVENTIL VENTOLIN) nebulizer solution 1.25 mg  1.25 mg Nebulization Q4H PRN  
 atorvastatin (LIPITOR) tablet 40 mg  40 mg Oral DAILY  bumetanide (BUMEX) tablet 2 mg  2 mg Oral BID  carvedilol (COREG) tablet 25 mg  25 mg Oral BID WITH MEALS  ferrous sulfate tablet 325 mg  325 mg Oral BID WITH MEALS  
 hydrALAZINE (APRESOLINE) tablet 100 mg  100 mg Oral TID  fluticasone (FLONASE) 50 mcg/actuation nasal spray 2 Spray  2 Spray Both Nostrils BID  loratadine (CLARITIN) tablet 10 mg  10 mg Oral DAILY  losartan (COZAAR) tablet 50 mg  50 mg Oral DAILY  montelukast (SINGULAIR) tablet 10 mg  10 mg Oral DAILY  PARoxetine (PAXIL) tablet 30 mg  30 mg Oral DAILY  QUEtiapine (SEROquel) tablet 25 mg  25 mg Oral TID  albuterol-ipratropium (DUO-NEB) 2.5 MG-0.5 MG/3 ML  3 mL Nebulization Q4H PRN  
 insulin glargine (LANTUS) injection 10 Units  10 Units SubCUTAneous QHS  insulin glargine (LANTUS) injection 20 Units  20 Units SubCUTAneous DAILY Lab Review:  
 
Recent Labs 10/18/18 
0323 10/17/18 
1423 WBC 12.2* 12.2* HGB 7.5* 6.8* HCT 24.4* 22.6*  
 232 Recent Labs 10/18/18 
0323 10/17/18 
1535 10/17/18 
1423   --  139  
K 4.0  --  4.7   --  102 CO2 28  --  29 *  --  183* BUN 63*  --  66* CREA 3.75*  --  3.64* CA 7.9*  --  8.2* MG  --   --  1.8 PHOS 5.3*  --   --   
ALB 2.8*  --  3.1* TBILI  --   --  0.6 SGOT  --   --  19 ALT  --   --  17 INR  --  1.0  -- Lab Results Component Value Date/Time  Glucose (POC) 162 (H) 10/18/2018 07:06 AM  
 Glucose (POC) 231 (H) 10/17/2018 08:23 PM  
 Glucose (POC) 170 (H) 10/17/2018 04:36 PM  
 Glucose (POC) 364 (H) 05/30/2018 04:26 PM  
 Glucose (POC) 388 (H) 05/30/2018 11:02 AM  
 
 
 
 Total time spent with patient: 30 Minutes Care Plan discussed with: Patient Discussed:  Care Plan Prophylaxis:  SCD's Disposition:  Home w/Family 
        
___________________________________________________ Attending Physician: Bo Phelps MD

## 2018-10-18 NOTE — DISCHARGE INSTRUCTIONS
HOSPITALIST DISCHARGE INSTRUCTIONS  NAME: Chantale Gallo   :  1955   MRN:  [id number]     Date/Time:  10/18/2018 4:47 PM    ADMIT DATE: 10/17/2018     DISCHARGE DATE: 10/18/2018     DISCHARGE DIAGNOSIS:  Anemia    MEDICATIONS:    · It is important that you take the medication exactly as they are prescribed. · Keep your medication in the bottles provided by the pharmacist and keep a list of the medication names, dosages, and times to be taken in your wallet. · Do not take other medications without consulting your doctor. What to do at Home:  1. Check your blood pressure at home twice a day. Call your doctor for blood pressure greater than 150/90 or lower than 110/55 or if you have dizziness or lightheadedness. 2.  Check you blood sugar twice a day. Call your doctor for blood sugar persistently greater than 200 or lower than 80. Recommended diet:  Cardiac Diet and Diabetic Diet    Recommended activity: Activity as tolerated    If you experience any of the following symptoms then please call your primary care physician or return to the emergency room if you cannot get hold of your doctor:  Fever, chills, nausea, vomiting, falling, bleeding, dizziness, lightheadedness, shortness of breath, chest pain. Follow Up: Follow up with your [facility] physician in AM for repeat blood count. Follow up with nephrology,  [facility]. Information obtained by :  I understand that if any problems occur once I am at home I am to contact my physician. I understand and acknowledge receipt of the instructions indicated above.                                                                                                                                            Physician's or R.N.'s Signature                                                                  Date/Time Patient or Representative Signature                                                          Date/Time\

## 2018-10-18 NOTE — PROGRESS NOTES
Spiritual Care Partner Volunteer visited patient in [address] [place] [address] on 10/18/18. Documented by: Aristeo Bell [address] [place] [address] ([id number])

## 2018-10-18 NOTE — DISCHARGE SUMMARY
Physician Discharge Summary     Patient ID:  Ronnie Holguin  [id number]  58 y.o.  1955    Admit date: 10/17/2018    Discharge date and time: 10/18/2018    Admission Diagnoses: Anemia due to chronic kidney disease, unspecified CKD stage    Discharge Diagnoses:    Principal Problem:    Anemia due to chronic kidney disease (10/17/2018)    Active Problems:    Hypertension ()      COPD (chronic obstructive pulmonary disease) ([facility]) ()      CKD (chronic kidney disease) stage 4, GFR 15-29 ml/min ([facility]) ()      DM type 2 causing CKD stage 4 ([facility] (10/17/2018)      (HFpEF) heart failure with preserved ejection fraction ([facility] (10/17/2018)         Hospital Course: Anemia due to chronic kidney disease (10/17/2018):  Patient has been chronically anemic.  HGB in May of this year was 7.3-7.4.  Hemoccult done is negative in ED.  Suspect dark colored stools are due to iron supplementation.  Suspect chronic anemia due to CKD.  Has procrit at home, but has not yet started it.  Had EGD and colonoscopy in the past 2-3 years without etiology for anemia. HGB improved post transfusion. Patient will have repeat labs in AM with Tanner Yoder. To consider capsule study as outpatient.     CKD (chronic kidney disease) stage 4, GFR 15-29 ml/min ([facility]) ():  Followed by Dr. Catie kwon. [facility] urgent needs for HD. Appreciate nephrology consult. Creatinine stable overnight. Will start Procrit per nephrology rec.     Hypertension ():  BP controlled on coreg, hydralazine, amlodipine, and losartan.     COPD (chronic obstructive pulmonary disease) ([facility]) ():  Reports chronic dyspnea and this is currently at baseline.     DM type 2 causing CKD stage 4 ([facility] (10/17/2018): To resume home insulin regimen.     (HFpEF) heart failure with preserved ejection fraction ([facility] (10/17/2018): To continue bumex.         PCP: Pavel Hooks MD     Consults: GI and Nephrology    Condition of patient at discharge: improved    Discharge Exam:  Please refer to progress note from day of discharge. Disposition: home    Discharge Medications:   Current Discharge Medication List      CONTINUE these medications which have CHANGED    Details   raNITIdine (ZANTAC) 150 mg tablet Take 1 Tab by mouth daily. Qty: 1 Tab, Refills: 0         CONTINUE these medications which have NOT CHANGED    Details   albuterol (PROVENTIL VENTOLIN) 2.5 mg /3 mL (0.083 %) nebulizer solution 1.25 mg by Nebulization route every four (4) hours as needed for Wheezing or Shortness of Breath. atorvastatin (LIPITOR) 20 mg tablet Take 40 mg by mouth daily. hydrALAZINE (APRESOLINE) 50 mg tablet Take 100 mg by mouth three (3) times daily. !! insulin glargine (LANTUS U-100 INSULIN) 100 unit/mL injection 30 Units by SubCUTAneous route daily. losartan (COZAAR) 50 mg tablet Take 50 mg by mouth daily. bumetanide (BUMEX) 2 mg tablet Take 2 mg by mouth two (2) times a day. budesonide-formoterol (SYMBICORT) 160-4.5 mcg/actuation HFAA Take 2 Puffs by inhalation two (2) times a day. QUEtiapine (SEROQUEL) 25 mg tablet Take 25 mg by mouth three (3) times daily. albuterol (PROVENTIL HFA, VENTOLIN HFA, PROAIR HFA) 90 mcg/actuation inhaler Take 2 Puffs by inhalation every six (6) hours as needed for Wheezing. epoetin mario (PROCRIT INJECTION) by Injection route every thirty (30) days. montelukast (SINGULAIR) 10 mg tablet Take 10 mg by mouth daily. ferrous sulfate 325 mg (65 mg iron) tablet Take 325 mg by mouth two (2) times daily (with meals). glucagon (GLUCAGEN) 1 mg injection 1 mg by IntraMUSCular route daily as needed for Hypoglycemia. acetaminophen (TYLENOL) 500 mg tablet Take 500 mg by mouth every six (6) hours as needed for Pain. !! insulin glargine (LANTUS U-100 INSULIN) 100 unit/mL injection 20 Units by SubCUTAneous route nightly.       insulin lispro (HUMALOG U-100 INSULIN) 100 unit/mL injection 16 Units by SubCUTAneous route Before breakfast, lunch, and dinner. aspirin delayed-release 81 mg tablet Take 81 mg by mouth daily. loratadine (CLARITIN) 10 mg tablet Take 10 mg by mouth daily. PARoxetine (PAXIL) 30 mg tablet Take 30 mg by mouth daily. carvedilol (COREG) 25 mg tablet Take 25 mg by mouth two (2) times daily (with meals). fluticasone (FLONASE ALLERGY RELIEF) 50 mcg/actuation nasal spray 2 Sprays by Both Nostrils route two (2) times a day. ergocalciferol (VITAMIN D2) 50,000 unit capsule Take 50,000 Units by mouth Every Saturday. !! - Potential duplicate medications found. Please discuss with provider. Activity: Activity as tolerated  Diet: Cardiac Diet and Diabetic Diet    Follow-up with Arianna Villar MD in AM for CBC. Approximate time spent in patient care on day of discharge: 40 min.     Signed:  Kavon Medel MD  10/18/2018  4:57 PM

## 2018-10-18 NOTE — PROGRESS NOTES
10/18/2018  
9:42 AM 
EMR reviewed. Reason for Admission:   Anemia due to chronic kidney disease, unspeciified CKD stage RRAT Score: 30 Resources/supports as identified by patient/family:   Pt has 49 hours of personal care (7 hours a day) through her Medicaid benefit with [facility]. They provide daily care, meals, transportation to appointments, shopping services, cleaning etc.  She has a Medicaid benefit for transportation and uses it for times when they are unable to take her to an appointment. Additionally, she uses [facility] for bi-weekly visits to check her BP and set up her medication pill box. This is through the Paramedic team.   
             
Top Challenges facing patient (as identified by patient/family and CM): Finances/Medication cost?    Has Medicaid/[place] Transportation? Medicaid transportation and caregiver support Support system or lack thereof? Has a daughter, Esequiel Fragoso ([phone]) and has wonderful neighbors Living arrangements? Apartment with an elevator and no steps Self-care/ADLs/Cognition? Cognition- good; caregivers assist with ADL's Current Advanced Directive/Advance Care Plan:  Full Plan for utilizing home health:    Uses [facility] bi-weekly Likelihood of readmission:  High/Red Transition of Care Plan:      Pt resides in an apartment with a good support system and caregivers. She utilizes home health for medication assistance and blood pressure checks with [facility]. Will continue to follow for discharge needs. DME:  Rollator, 4 prong cane, two white canes for dx of blindness, Nebulizer,  Waiting on a CPAP machine-per patient she received approval for the machine a month ago and is waiting to receive it from [place] [id number] or [place]; prefers [facility] Care Management Interventions PCP Verified by CM: Yes Mode of Transport at Discharge: Other (see comment)(Pt has Medicaid for transportation) Discharge Durable Medical Equipment: No 
Physical Therapy Consult: No 
Occupational Therapy Consult: No 
Speech Therapy Consult: No 
Current Support Network: Lives Alone Confirm Follow Up Transport: Other (see comment)(Pt uses Medicaid transportation or caregivers) Discharge Location Discharge Placement: Home(Pt has caregivers to assist in the home)

## 2018-10-19 ENCOUNTER — HOSPITAL ENCOUNTER (OUTPATIENT)
Dept: INFUSION THERAPY | Age: 63
Discharge: HOME | End: 2018-10-19
Payer: MEDICARE

## 2019-01-18 RX ORDER — SODIUM CHLORIDE 9 MG/ML
25 INJECTION, SOLUTION INTRAVENOUS CONTINUOUS
Status: DISCONTINUED | OUTPATIENT
Start: 2019-01-22 | End: 2019-01-21

## 2019-01-18 RX ORDER — DIPHENHYDRAMINE HCL 25 MG
25 CAPSULE ORAL ONCE
Status: DISCONTINUED | OUTPATIENT
Start: 2019-01-22 | End: 2019-01-21

## 2019-01-18 RX ORDER — ACETAMINOPHEN 325 MG/1
650 TABLET ORAL ONCE
Status: DISCONTINUED | OUTPATIENT
Start: 2019-01-22 | End: 2019-01-21

## 2019-01-21 RX ORDER — SODIUM CHLORIDE 9 MG/ML
25 INJECTION, SOLUTION INTRAVENOUS CONTINUOUS
Status: DISPENSED | OUTPATIENT
Start: 2019-01-23 | End: 2019-01-23

## 2019-01-21 RX ORDER — ACETAMINOPHEN 325 MG/1
650 TABLET ORAL ONCE
Status: COMPLETED | OUTPATIENT
Start: 2019-01-23 | End: 2019-01-23

## 2019-01-21 RX ORDER — DIPHENHYDRAMINE HCL 25 MG
25 CAPSULE ORAL ONCE
Status: COMPLETED | OUTPATIENT
Start: 2019-01-23 | End: 2019-01-23

## 2019-01-22 ENCOUNTER — HOSPITAL ENCOUNTER (OUTPATIENT)
Dept: INFUSION THERAPY | Age: 64
Discharge: HOME | End: 2019-01-22
Payer: MEDICARE

## 2019-01-22 VITALS
HEART RATE: 87 BPM | SYSTOLIC BLOOD PRESSURE: 95 MMHG | TEMPERATURE: 97.5 F | OXYGEN SATURATION: 95 % | RESPIRATION RATE: 18 BRPM | DIASTOLIC BLOOD PRESSURE: 51 MMHG

## 2019-01-22 LAB
BASOPHILS # BLD: 0.1 K/UL (ref 0–0.1)
BASOPHILS NFR BLD: 1 % (ref 0–1)
DIFFERENTIAL METHOD BLD: ABNORMAL
EOSINOPHIL # BLD: 0.1 K/UL (ref 0–0.4)
EOSINOPHIL NFR BLD: 1 % (ref 0–7)
ERYTHROCYTE [DISTWIDTH] IN BLOOD BY AUTOMATED COUNT: 16 % (ref 11.5–14.5)
FERRITIN SERPL-MCNC: 183 NG/ML (ref 8–252)
HCT VFR BLD AUTO: 21 % (ref 35–47)
HGB BLD-MCNC: 6.1 G/DL (ref 11.5–16)
IMM GRANULOCYTES # BLD AUTO: 0 K/UL (ref 0–0.04)
IMM GRANULOCYTES NFR BLD AUTO: 0 % (ref 0–0.5)
IRON SATN MFR SERPL: 20 % (ref 20–50)
IRON SERPL-MCNC: 40 UG/DL (ref 35–150)
LYMPHOCYTES # BLD: 1.5 K/UL (ref 0.8–3.5)
LYMPHOCYTES NFR BLD: 14 % (ref 12–49)
MCH RBC QN AUTO: 24.8 PG (ref 26–34)
MCHC RBC AUTO-ENTMCNC: 29 G/DL (ref 30–36.5)
MCV RBC AUTO: 85.4 FL (ref 80–99)
MONOCYTES # BLD: 0.6 K/UL (ref 0–1)
MONOCYTES NFR BLD: 5 % (ref 5–13)
NEUTS SEG # BLD: 8.7 K/UL (ref 1.8–8)
NEUTS SEG NFR BLD: 79 % (ref 32–75)
NRBC # BLD: 0 K/UL (ref 0–0.01)
NRBC BLD-RTO: 0 PER 100 WBC
PLATELET # BLD AUTO: 188 K/UL (ref 150–400)
PLATELET COMMENTS,PCOM: ABNORMAL
PMV BLD AUTO: 12.7 FL (ref 8.9–12.9)
RBC # BLD AUTO: 2.46 M/UL (ref 3.8–5.2)
RBC MORPH BLD: ABNORMAL
TIBC SERPL-MCNC: 200 UG/DL (ref 250–450)
WBC # BLD AUTO: 11 K/UL (ref 3.6–11)

## 2019-01-22 PROCEDURE — 86923 COMPATIBILITY TEST ELECTRIC: CPT

## 2019-01-22 PROCEDURE — 85025 COMPLETE CBC W/AUTO DIFF WBC: CPT

## 2019-01-22 PROCEDURE — 83540 ASSAY OF IRON: CPT

## 2019-01-22 PROCEDURE — 86900 BLOOD TYPING SEROLOGIC ABO: CPT

## 2019-01-22 PROCEDURE — 36415 COLL VENOUS BLD VENIPUNCTURE: CPT

## 2019-01-22 PROCEDURE — 82728 ASSAY OF FERRITIN: CPT

## 2019-01-22 RX ORDER — SODIUM CHLORIDE 9 MG/ML
250 INJECTION, SOLUTION INTRAVENOUS AS NEEDED
Status: CANCELLED | OUTPATIENT
Start: 2019-01-22

## 2019-01-22 NOTE — PROGRESS NOTES
[facility] Lab Visit: 
 
7545  Pt arrived ambulatory with a walker in stable condition. Labs drawn peripherally from the right arm, Tolerated well. No concerns voiced. Departed [facility] ambulatory with walker  
 and in no distress. Visit Vitals BP 95/51 (BP 1 Location: Left arm, BP Patient Position: Sitting) Pulse 87 Temp 97.5 °F (36.4 °C) Resp 18 SpO2 95% Labs available in CC once resulted.

## 2019-01-23 ENCOUNTER — HOSPITAL ENCOUNTER (OUTPATIENT)
Dept: INFUSION THERAPY | Age: 64
Discharge: HOME | End: 2019-01-23
Payer: MEDICARE

## 2019-01-23 VITALS
RESPIRATION RATE: 18 BRPM | SYSTOLIC BLOOD PRESSURE: 129 MMHG | TEMPERATURE: 98 F | DIASTOLIC BLOOD PRESSURE: 54 MMHG | HEART RATE: 82 BPM

## 2019-01-23 PROCEDURE — 74011250637 HC RX REV CODE- 250/637

## 2019-01-23 PROCEDURE — 77030013169 SET IV BLD ICUM -A

## 2019-01-23 PROCEDURE — 74011250636 HC RX REV CODE- 250/636

## 2019-01-23 PROCEDURE — 36430 TRANSFUSION BLD/BLD COMPNT: CPT

## 2019-01-23 PROCEDURE — P9016 RBC LEUKOCYTES REDUCED: HCPCS

## 2019-01-23 PROCEDURE — P9040 RBC LEUKOREDUCED IRRADIATED: HCPCS

## 2019-01-23 RX ORDER — SODIUM CHLORIDE 9 MG/ML
250 INJECTION, SOLUTION INTRAVENOUS AS NEEDED
Status: DISCONTINUED | OUTPATIENT
Start: 2019-01-23 | End: 2019-01-27 | Stop reason: HOSPADM

## 2019-01-23 RX ADMIN — SODIUM CHLORIDE 25 ML/HR: 900 INJECTION, SOLUTION INTRAVENOUS at 11:18

## 2019-01-23 RX ADMIN — ACETAMINOPHEN 650 MG: 325 TABLET ORAL at 11:17

## 2019-01-23 RX ADMIN — DIPHENHYDRAMINE HYDROCHLORIDE 25 MG: 25 CAPSULE ORAL at 11:17

## 2019-01-23 NOTE — PROGRESS NOTES
1030 Pt arrived at [facility]  and in no distress for transfusion of 1 units PRBCs. Assessment completed, no new complaints voiced. IV established in R arm without difficulty. Signs/symptoms of adverse blood reaction discussed with pt, voiced understanding. Medications received: 
NS @ [facility] Tylenol 650 mg po Benadryl 25 mg po 
 
1120 1st unit PRBCs started and infusing without difficulty, observed x 15 minutes 1330:  1st unit completed without adverse reaction noted, NS flushing line. 1340 Tolerated transfusion  well, no adverse reaction noted. D/C instructions reviewed, copy to pt, voiced understanding. Patient declined 1 hour post transfusion observation. D/Cd from [facility] and in no distress accompanied by sister. Patient to foloow up with MD for future appointments.

## 2019-08-20 RX ORDER — SODIUM CHLORIDE 9 MG/ML
25 INJECTION, SOLUTION INTRAVENOUS CONTINUOUS
Status: DISPENSED | OUTPATIENT
Start: 2019-08-23 | End: 2019-08-23

## 2019-08-23 ENCOUNTER — HOSPITAL ENCOUNTER (OUTPATIENT)
Dept: INFUSION THERAPY | Age: 64
Discharge: HOME | End: 2019-08-23
Payer: MEDICARE

## 2019-08-23 VITALS
OXYGEN SATURATION: 100 % | DIASTOLIC BLOOD PRESSURE: 70 MMHG | TEMPERATURE: 98.2 F | HEART RATE: 77 BPM | RESPIRATION RATE: 16 BRPM | SYSTOLIC BLOOD PRESSURE: 136 MMHG

## 2019-08-23 PROCEDURE — 74011000258 HC RX REV CODE- 258: Performed by: INTERNAL MEDICINE

## 2019-08-23 PROCEDURE — 74011250636 HC RX REV CODE- 250/636: Performed by: INTERNAL MEDICINE

## 2019-08-23 PROCEDURE — 96365 THER/PROPH/DIAG IV INF INIT: CPT

## 2019-08-23 RX ADMIN — FERUMOXYTOL 510 MG: 510 INJECTION INTRAVENOUS at 12:14

## 2019-08-23 NOTE — PROGRESS NOTES
[address] at [address] Patient arrives for [facility] without acute problems. Please see connect care for complete assessment and education provided. Vital signs stable throughout and prior to discharge, Pt. Tolerated treatment well and discharged without incident. Patient/parent is aware of next [place] appointment on 8/30/2019. Appointment card given to patient/parents. Medications Verified by Dionicio Collazo RN & Elayne Cooley RN via Micromedex:  1.  Feraheme 510mg    VITAL SIGNS   Patient Vitals for the past 12 hrs:   Temp Pulse Resp BP SpO2   08/23/19 1237 98.2 °F (36.8 °C) 77 16 136/70    08/23/19 1052 98.2 °F (36.8 °C) 89 16 144/71 100 %

## 2019-08-27 RX ORDER — SODIUM CHLORIDE 9 MG/ML
25 INJECTION, SOLUTION INTRAVENOUS CONTINUOUS
Status: DISPENSED | OUTPATIENT
Start: 2019-08-30 | End: 2019-08-30

## 2019-08-30 ENCOUNTER — HOSPITAL ENCOUNTER (OUTPATIENT)
Dept: INFUSION THERAPY | Age: 64
Discharge: HOME | End: 2019-08-30
Payer: MEDICARE

## 2019-08-30 VITALS
DIASTOLIC BLOOD PRESSURE: 68 MMHG | SYSTOLIC BLOOD PRESSURE: 110 MMHG | HEART RATE: 84 BPM | RESPIRATION RATE: 16 BRPM | TEMPERATURE: 98.2 F | OXYGEN SATURATION: 100 %

## 2019-08-30 PROCEDURE — 74011000258 HC RX REV CODE- 258: Performed by: INTERNAL MEDICINE

## 2019-08-30 PROCEDURE — 96374 THER/PROPH/DIAG INJ IV PUSH: CPT

## 2019-08-30 PROCEDURE — 74011250636 HC RX REV CODE- 250/636: Performed by: INTERNAL MEDICINE

## 2019-08-30 RX ADMIN — SODIUM CHLORIDE 25 ML/HR: 900 INJECTION, SOLUTION INTRAVENOUS at 10:38

## 2019-08-30 RX ADMIN — FERUMOXYTOL 510 MG: 510 INJECTION INTRAVENOUS at 11:25

## 2019-08-30 NOTE — PROGRESS NOTES
[address] at [address] Patient arrives for Feraheme (2 of 2) without acute problems. Please see connect care for complete assessment and education provided. Vital signs stable throughout and prior to discharge, Pt. Tolerated treatment well and discharged without incident. Patient is aware of no future [facility] appointments. Medications Verified by Sharon Ornelas RN & Callie Rodriguez RN via Micromedex:  1.  Feraheme 510mg    VITAL SIGNS   Patient Vitals for the past 12 hrs:   Temp Pulse Resp BP SpO2   08/30/19 1143 98.3 °F (36.8 °C) 83 16 103/65    08/30/19 1032 98.9 °F (37.2 °C) 94 16 132/69 100 %

## 2021-02-04 ENCOUNTER — APPOINTMENT (OUTPATIENT)
Dept: CT IMAGING | Age: 66
End: 2021-02-04
Attending: STUDENT IN AN ORGANIZED HEALTH CARE EDUCATION/TRAINING PROGRAM
Payer: MEDICARE

## 2021-02-04 ENCOUNTER — HOSPITAL ENCOUNTER (EMERGENCY)
Age: 66
Discharge: HOME | End: 2021-02-04
Attending: STUDENT IN AN ORGANIZED HEALTH CARE EDUCATION/TRAINING PROGRAM
Payer: MEDICARE

## 2021-02-04 VITALS
OXYGEN SATURATION: 99 % | DIASTOLIC BLOOD PRESSURE: 72 MMHG | HEART RATE: 90 BPM | RESPIRATION RATE: 18 BRPM | SYSTOLIC BLOOD PRESSURE: 157 MMHG | TEMPERATURE: 98 F

## 2021-02-04 DIAGNOSIS — R10.819 ABDOMINAL TENDERNESS, REBOUND TENDERNESS PRESENCE NOT SPECIFIED, UNSPECIFIED LOCATION: Primary | ICD-10-CM

## 2021-02-04 LAB
COMMENT, HOLDF: NORMAL
SAMPLES BEING HELD,HOLD: NORMAL

## 2021-02-04 PROCEDURE — 99283 EMERGENCY DEPT VISIT LOW MDM: CPT

## 2021-02-04 PROCEDURE — 74176 CT ABD & PELVIS W/O CONTRAST: CPT

## 2021-02-04 PROCEDURE — 93005 ELECTROCARDIOGRAM TRACING: CPT

## 2021-02-04 RX ORDER — ONDANSETRON 4 MG/1
4 TABLET, ORALLY DISINTEGRATING ORAL
Qty: 9 TAB | Refills: 0 | Status: SHIPPED | OUTPATIENT
Start: 2021-02-04

## 2021-02-04 RX ORDER — ONDANSETRON 2 MG/ML
4 INJECTION INTRAMUSCULAR; INTRAVENOUS
Status: DISCONTINUED | OUTPATIENT
Start: 2021-02-04 | End: 2021-02-04

## 2021-02-04 RX ORDER — ACETAMINOPHEN 500 MG
500 TABLET ORAL
Qty: 20 TAB | Refills: 0 | Status: SHIPPED | OUTPATIENT
Start: 2021-02-04

## 2021-02-04 NOTE — ED TRIAGE NOTES
TRIAGE NOTE:   Patient arrives by EMS with c/o nausea and abdominal pain. Patient reports pain worsens with food or drink.

## 2021-02-05 LAB
ATRIAL RATE: 88 BPM
CALCULATED P AXIS, ECG09: 54 DEGREES
CALCULATED R AXIS, ECG10: -2 DEGREES
CALCULATED T AXIS, ECG11: 53 DEGREES
DIAGNOSIS, 93000: NORMAL
P-R INTERVAL, ECG05: 198 MS
Q-T INTERVAL, ECG07: 398 MS
QRS DURATION, ECG06: 92 MS
QTC CALCULATION (BEZET), ECG08: 481 MS
VENTRICULAR RATE, ECG03: 88 BPM

## 2021-02-05 NOTE — ED PROVIDER NOTES
The history is provided by the patient. Abdominal Pain   This is a new problem. The current episode started 3 to 5 hours ago. The problem occurs rarely. The problem has been gradually improving. The pain is associated with eating (was drinking a smoothie when developed cramping pain). The pain is located in the generalized abdominal region. The quality of the pain is cramping. The pain is at a severity of 2/10 (currently). The pain is mild. Associated symptoms include nausea. Pertinent negatives include no fever, no diarrhea, no melena, no vomiting, no dysuria, no headaches, no chest pain and no back pain. The pain is worsened by eating. The pain is relieved by nothing. Her past medical history is significant for DM. Past medical history comments: HTN, CKD, COPD. The patient's surgical history includes cholecystectomy.        Past Medical History:   Diagnosis Date    Asthma     Chronic kidney disease     CKD (chronic kidney disease) stage 4, GFR 15-29 ml/min (HCC)     COPD (chronic obstructive pulmonary disease) (HCC)     Diabetes (HCC)     Diastolic heart failure (HCC)     Hypertension     Pulmonary HTN (HCC)     Sleep apnea     no CPAP - waiting for it to be delivered       Past Surgical History:   Procedure Laterality Date    HX CHOLECYSTECTOMY      HX GYN          HX HEENT      tonsillectomy    HX HEENT      deviated septum repair         Family History:   Problem Relation Age of Onset    Hypertension Mother     Heart Failure Mother        Social History     Socioeconomic History    Marital status: LEGALLY      Spouse name: Not on file    Number of children: Not on file    Years of education: Not on file    Highest education level: Not on file   Occupational History    Not on file   Social Needs    Financial resource strain: Not on file    Food insecurity     Worry: Not on file     Inability: Not on file    Transportation needs     Medical: Not on file     Non-medical: Not on file   Tobacco Use    Smoking status: Never Smoker    Smokeless tobacco: Never Used   Substance and Sexual Activity    Alcohol use: No    Drug use: No    Sexual activity: Not on file   Lifestyle    Physical activity     Days per week: Not on file     Minutes per session: Not on file    Stress: Not on file   Relationships    Social connections     Talks on phone: Not on file     Gets together: Not on file     Attends Evangelical service: Not on file     Active member of club or organization: Not on file     Attends meetings of clubs or organizations: Not on file     Relationship status: Not on file    Intimate partner violence     Fear of current or ex partner: Not on file     Emotionally abused: Not on file     Physically abused: Not on file     Forced sexual activity: Not on file   Other Topics Concern    Not on file   Social History Narrative    Not on file         ALLERGIES: Codeine and Lisinopril    Review of Systems   Constitutional: Negative for fever. Respiratory: Negative for cough and shortness of breath. Cardiovascular: Negative for chest pain. Gastrointestinal: Positive for abdominal pain and nausea. Negative for diarrhea, melena and vomiting. Genitourinary: Negative for dysuria. Musculoskeletal: Negative for back pain. Skin: Negative for rash. Neurological: Negative for syncope and headaches. All other systems reviewed and are negative. Vitals:    02/04/21 1750   BP: (!) 157/72   Pulse: 90   Resp: 18   Temp: 98 °F (36.7 °C)   SpO2: 99%            Physical Exam  Vitals signs and nursing note reviewed. Constitutional:       General: She is not in acute distress. Appearance: She is well-developed. She is obese. HENT:      Head: Normocephalic and atraumatic. Eyes:      Conjunctiva/sclera: Conjunctivae normal.   Neck:      Musculoskeletal: Normal range of motion and neck supple. Cardiovascular:      Rate and Rhythm: Normal rate and regular rhythm.    Pulmonary: Effort: Pulmonary effort is normal. No respiratory distress. Abdominal:      Palpations: Abdomen is soft. Tenderness: There is generalized abdominal tenderness. There is no guarding. Musculoskeletal: Normal range of motion. Skin:     General: Skin is warm and dry. Neurological:      Mental Status: She is alert and oriented to person, place, and time. Motor: No abnormal muscle tone. MDM       Procedures    EKG interpretation: 20:23  Rhythm: normal sinus rhythm; and regular . Rate (approx.): 88; Axis: normal; Intervals: normal ; ST/T wave: no STEMI; EKG documented and interpreted by Tima Blankenship MD, ED MD.      9:20 PM  Patient feeling better, discussed CT results. Patient hard stick, not wanting to stay for labs and requesting discharge home. Will treat symptomatically with Tylenol and Zofran, close follow-up with PCP and return to ER precautions provided. The patient is resting comfortably and feels better, is alert and in no distress. The repeat examination is unremarkable and benign; in particular, there is no discomfort at McBurney's point and there is no pulsatile mass. The history, exam, diagnostic testing, and current condition do not suggest acute appendicitis, bowel obstruction, acute cholecystitis, bowel perforation, major gastrointestinal bleeding, severe diverticulitis, abdominal aortic aneurysm, mesenteric ischemia, volvulus, sepsis, or other significant pathology to warrant further testing, continued ED treatment, admission, or surgical evaluation at this point. The vital signs have been stable. The patient does not have uncontrollable pain, intractable vomiting, or other significant symptoms. The patient's condition is stable and appropriate for discharge from the emergency department.  The patient will pursue further outpatient evaluation with the primary care physician or other designated or consulting physician as indicated in the discharge instructions.

## 2022-02-17 ENCOUNTER — HOSPITAL ENCOUNTER (OUTPATIENT)
Dept: GENERAL RADIOLOGY | Age: 67
Discharge: HOME | End: 2022-02-17
Attending: FAMILY MEDICINE
Payer: MEDICARE

## 2022-02-17 ENCOUNTER — TRANSCRIBE ORDER (OUTPATIENT)
Dept: GENERAL RADIOLOGY | Age: 67
End: 2022-02-17

## 2022-02-17 DIAGNOSIS — M25.50 ARTHRALGIA: ICD-10-CM

## 2022-02-17 DIAGNOSIS — M25.50 ARTHRALGIA: Primary | ICD-10-CM

## 2022-02-17 PROCEDURE — 72050 X-RAY EXAM NECK SPINE 4/5VWS: CPT

## 2022-02-17 PROCEDURE — 73110 X-RAY EXAM OF WRIST: CPT

## 2022-02-17 PROCEDURE — 73080 X-RAY EXAM OF ELBOW: CPT

## 2022-02-17 PROCEDURE — 73562 X-RAY EXAM OF KNEE 3: CPT

## 2022-03-18 PROBLEM — A41.9 SEPSIS (HCC): Status: ACTIVE | Noted: 2018-05-28

## 2022-03-19 PROBLEM — N18.9 ANEMIA DUE TO CHRONIC KIDNEY DISEASE: Status: ACTIVE | Noted: 2018-10-17

## 2022-03-19 PROBLEM — D63.1 ANEMIA DUE TO CHRONIC KIDNEY DISEASE: Status: ACTIVE | Noted: 2018-10-17

## 2022-03-19 PROBLEM — E11.22 DM TYPE 2 CAUSING CKD STAGE 4 (HCC): Status: ACTIVE | Noted: 2018-10-17

## 2022-03-19 PROBLEM — N18.4 DM TYPE 2 CAUSING CKD STAGE 4 (HCC): Status: ACTIVE | Noted: 2018-10-17

## 2022-03-19 PROBLEM — I50.30 (HFPEF) HEART FAILURE WITH PRESERVED EJECTION FRACTION (HCC): Status: ACTIVE | Noted: 2018-10-17

## 2022-12-29 ENCOUNTER — TRANSCRIBE ORDER (OUTPATIENT)
Dept: SCHEDULING | Age: 67
End: 2022-12-29

## 2022-12-29 DIAGNOSIS — Z12.31 SCREENING MAMMOGRAM FOR HIGH-RISK PATIENT: Primary | ICD-10-CM

## 2023-02-07 ENCOUNTER — HOSPITAL ENCOUNTER (OUTPATIENT)
Dept: MAMMOGRAPHY | Age: 68
Discharge: HOME | End: 2023-02-07
Attending: HOSPITALIST
Payer: MEDICARE

## 2023-02-07 DIAGNOSIS — Z12.31 SCREENING MAMMOGRAM FOR HIGH-RISK PATIENT: ICD-10-CM

## 2023-02-07 PROCEDURE — 77067 SCR MAMMO BI INCL CAD: CPT

## 2023-04-24 RX ORDER — SODIUM CHLORIDE 9 MG/ML
25 INJECTION, SOLUTION INTRAVENOUS CONTINUOUS
Status: DISPENSED | OUTPATIENT
Start: 2023-04-25 | End: 2023-04-25

## 2023-04-24 RX ORDER — SODIUM CHLORIDE 9 MG/ML
25 INJECTION, SOLUTION INTRAVENOUS CONTINUOUS
Status: DISCONTINUED | OUTPATIENT
Start: 2023-05-02 | End: 2023-05-03 | Stop reason: HOSPADM

## 2023-04-25 ENCOUNTER — HOSPITAL ENCOUNTER (OUTPATIENT)
Dept: INFUSION THERAPY | Age: 68
Discharge: HOME | End: 2023-04-25
Payer: MEDICARE

## 2023-04-25 VITALS
RESPIRATION RATE: 18 BRPM | HEART RATE: 75 BPM | TEMPERATURE: 98.3 F | DIASTOLIC BLOOD PRESSURE: 70 MMHG | OXYGEN SATURATION: 98 % | SYSTOLIC BLOOD PRESSURE: 132 MMHG

## 2023-04-25 PROCEDURE — 74011250636 HC RX REV CODE- 250/636: Performed by: INTERNAL MEDICINE

## 2023-04-25 PROCEDURE — 96374 THER/PROPH/DIAG INJ IV PUSH: CPT

## 2023-04-25 PROCEDURE — 74011000258 HC RX REV CODE- 258: Performed by: INTERNAL MEDICINE

## 2023-04-25 RX ADMIN — SODIUM CHLORIDE 25 ML/HR: 9 INJECTION, SOLUTION INTRAVENOUS at 14:25

## 2023-04-25 RX ADMIN — IRON SUCROSE 200 MG: 20 INJECTION, SOLUTION INTRAVENOUS at 15:09

## 2023-04-25 NOTE — PROGRESS NOTES
[facility] Peds/Adult Note                       Date: 2023    Name: Ashely Bryan    MRN: [id number]         : 1955    1400 Patient arrives for Venofer (dose 1/3) without acute problems. Please see Connect Care for complete assessment and education provided. Vital signs stable throughout and prior to discharge. Patient tolerated procedure well and was discharged without incident. Patient is aware of next [facility] appointment on 2023. Appointment card give to the Patient      Ms. Preston's vitals were reviewed prior to and after treatment. Patient Vitals for the past 12 hrs:   Temp Pulse Resp BP SpO2   23 1529 -- 75 18 132/70 --   23 1513 -- 76 18 132/64 --   23 1412 98.3 °F (36.8 °C) 80 18 (!) 144/64 98 %     Medications given:   Medications Administered       0.9% sodium chloride infusion       Admin Date  2023 Action  New Bag Dose  25 mL/hr Rate  25 mL/hr Route  IntraVENous Administered By  Jonathan Torres RN              iron sucrose (VENOFER) 200 mg in 0.9% sodium chloride 100 mL, overfill volume 10 mL IVPB       Admin Date  2023 Action  New Bag Dose  200 mg Rate  480 mL/hr Route  IntraVENous Administered By  Jonathan Torres RN                Ms. Josselyn Gallegos tolerated the infusion, and had no complaints. Ms. Josselyn Gallegos was discharged from [place] [id number] in stable condition.      Future Appointments   Date Time Provider sEe Guidry   2023  2:00 PM A3 PEDS FASTRACK NANCYOPIC . [facility]   2023  2:00 PM B4 PEDS FASTRACK RCKARYN Wier RN  2023  3:57 PM

## 2023-05-02 ENCOUNTER — HOSPITAL ENCOUNTER (OUTPATIENT)
Dept: INFUSION THERAPY | Age: 68
Discharge: HOME | End: 2023-05-02
Payer: MEDICARE

## 2023-05-02 VITALS
RESPIRATION RATE: 18 BRPM | DIASTOLIC BLOOD PRESSURE: 71 MMHG | TEMPERATURE: 97.9 F | SYSTOLIC BLOOD PRESSURE: 160 MMHG | HEART RATE: 89 BPM

## 2023-05-02 PROCEDURE — 74011000250 HC RX REV CODE- 250: Performed by: INTERNAL MEDICINE

## 2023-05-02 PROCEDURE — 96374 THER/PROPH/DIAG INJ IV PUSH: CPT

## 2023-05-02 PROCEDURE — 74011250636 HC RX REV CODE- 250/636: Performed by: INTERNAL MEDICINE

## 2023-05-02 PROCEDURE — 74011000258 HC RX REV CODE- 258: Performed by: INTERNAL MEDICINE

## 2023-05-02 RX ORDER — SODIUM CHLORIDE 9 MG/ML
INJECTION, SOLUTION INTRAVENOUS CONTINUOUS
Status: DISCONTINUED | OUTPATIENT
Start: 2023-05-09 | End: 2023-05-04

## 2023-05-02 RX ORDER — SODIUM CHLORIDE 9 MG/ML
10 INJECTION INTRAVENOUS AS NEEDED
Status: DISCONTINUED | OUTPATIENT
Start: 2023-05-02 | End: 2023-05-03 | Stop reason: HOSPADM

## 2023-05-02 RX ADMIN — SODIUM CHLORIDE 25 ML/HR: 9 INJECTION, SOLUTION INTRAVENOUS at 14:50

## 2023-05-02 RX ADMIN — IRON SUCROSE 200 MG: 20 INJECTION, SOLUTION INTRAVENOUS at 15:25

## 2023-05-02 RX ADMIN — SODIUM CHLORIDE, PRESERVATIVE FREE 10 ML: 5 INJECTION INTRAVENOUS at 14:50

## 2023-05-04 RX ORDER — EPINEPHRINE 1 MG/ML
0.3 INJECTION, SOLUTION, CONCENTRATE INTRAVENOUS PRN
OUTPATIENT
Start: 2023-05-08

## 2023-05-04 RX ORDER — ALBUTEROL SULFATE 90 UG/1
4 AEROSOL, METERED RESPIRATORY (INHALATION) PRN
OUTPATIENT
Start: 2023-05-08

## 2023-05-04 RX ORDER — FAMOTIDINE 10 MG/ML
20 INJECTION, SOLUTION INTRAVENOUS
OUTPATIENT
Start: 2023-05-08

## 2023-05-04 RX ORDER — SODIUM CHLORIDE 9 MG/ML
INJECTION, SOLUTION INTRAVENOUS CONTINUOUS
OUTPATIENT
Start: 2023-05-08

## 2023-05-04 RX ORDER — ONDANSETRON 2 MG/ML
8 INJECTION INTRAMUSCULAR; INTRAVENOUS
OUTPATIENT
Start: 2023-05-08

## 2023-05-04 RX ORDER — DIPHENHYDRAMINE HYDROCHLORIDE 50 MG/ML
50 INJECTION INTRAMUSCULAR; INTRAVENOUS
OUTPATIENT
Start: 2023-05-08

## 2023-05-04 RX ORDER — ACETAMINOPHEN 325 MG/1
650 TABLET ORAL
OUTPATIENT
Start: 2023-05-08

## 2023-05-09 ENCOUNTER — HOSPITAL ENCOUNTER (OUTPATIENT)
Facility: HOSPITAL | Age: 68
Setting detail: INFUSION SERIES
Discharge: HOME | End: 2023-05-09

## 2023-05-09 ENCOUNTER — HOSPITAL ENCOUNTER (OUTPATIENT)
Dept: INFUSION THERAPY | Age: 68
End: 2023-05-09

## 2023-05-09 DIAGNOSIS — N18.9 ANEMIA DUE TO CHRONIC KIDNEY DISEASE, UNSPECIFIED CKD STAGE: Primary | ICD-10-CM

## 2023-05-09 DIAGNOSIS — D63.1 ANEMIA DUE TO CHRONIC KIDNEY DISEASE, UNSPECIFIED CKD STAGE: Primary | ICD-10-CM

## 2023-05-09 RX ORDER — SODIUM CHLORIDE 9 MG/ML
5-250 INJECTION, SOLUTION INTRAVENOUS PRN
Status: DISCONTINUED | OUTPATIENT
Start: 2023-05-09 | End: 2023-05-10 | Stop reason: HOSPADM

## 2023-05-09 RX ORDER — SODIUM CHLORIDE 0.9 % (FLUSH) 0.9 %
5-40 SYRINGE (ML) INJECTION PRN
OUTPATIENT
Start: 2023-05-09

## 2023-05-09 RX ORDER — ALBUTEROL SULFATE 90 UG/1
4 AEROSOL, METERED RESPIRATORY (INHALATION) PRN
OUTPATIENT
Start: 2023-05-09

## 2023-05-09 RX ORDER — ONDANSETRON 2 MG/ML
8 INJECTION INTRAMUSCULAR; INTRAVENOUS
OUTPATIENT
Start: 2023-05-09

## 2023-05-09 RX ORDER — SODIUM CHLORIDE 9 MG/ML
5-250 INJECTION, SOLUTION INTRAVENOUS PRN
OUTPATIENT
Start: 2023-05-09

## 2023-05-09 RX ORDER — SODIUM CHLORIDE 9 MG/ML
INJECTION, SOLUTION INTRAVENOUS CONTINUOUS
OUTPATIENT
Start: 2023-05-09

## 2023-05-09 RX ORDER — EPINEPHRINE 1 MG/ML
0.3 INJECTION, SOLUTION, CONCENTRATE INTRAVENOUS PRN
OUTPATIENT
Start: 2023-05-09

## 2023-05-09 RX ORDER — HEPARIN SODIUM (PORCINE) LOCK FLUSH IV SOLN 100 UNIT/ML 100 UNIT/ML
500 SOLUTION INTRAVENOUS PRN
Status: CANCELLED | OUTPATIENT
Start: 2023-05-09

## 2023-05-09 RX ORDER — HEPARIN SODIUM (PORCINE) LOCK FLUSH IV SOLN 100 UNIT/ML 100 UNIT/ML
500 SOLUTION INTRAVENOUS PRN
Status: DISCONTINUED | OUTPATIENT
Start: 2023-05-09 | End: 2023-05-10 | Stop reason: HOSPADM

## 2023-05-09 RX ORDER — SODIUM CHLORIDE 0.9 % (FLUSH) 0.9 %
5-40 SYRINGE (ML) INJECTION PRN
Status: DISCONTINUED | OUTPATIENT
Start: 2023-05-09 | End: 2023-05-10 | Stop reason: HOSPADM

## 2023-05-09 RX ORDER — ACETAMINOPHEN 325 MG/1
650 TABLET ORAL
OUTPATIENT
Start: 2023-05-09

## 2023-05-09 RX ORDER — DIPHENHYDRAMINE HYDROCHLORIDE 50 MG/ML
50 INJECTION INTRAMUSCULAR; INTRAVENOUS
OUTPATIENT
Start: 2023-05-09

## 2023-05-30 PROBLEM — D63.1 ANEMIA DUE TO STAGE 5 CHRONIC KIDNEY DISEASE TREATED WITH ERYTHROPOIETIN (HCC): Status: ACTIVE | Noted: 2023-05-30

## 2023-05-30 PROBLEM — N18.5 ANEMIA DUE TO STAGE 5 CHRONIC KIDNEY DISEASE TREATED WITH ERYTHROPOIETIN (HCC): Status: ACTIVE | Noted: 2023-05-30

## 2023-06-06 ENCOUNTER — HOSPITAL ENCOUNTER (OUTPATIENT)
Facility: HOSPITAL | Age: 68
Setting detail: INFUSION SERIES
End: 2023-06-06
Payer: MEDICAID

## 2023-06-06 VITALS
SYSTOLIC BLOOD PRESSURE: 137 MMHG | DIASTOLIC BLOOD PRESSURE: 48 MMHG | RESPIRATION RATE: 18 BRPM | HEART RATE: 82 BPM | TEMPERATURE: 98 F

## 2023-06-06 DIAGNOSIS — D63.1 ANEMIA DUE TO STAGE 5 CHRONIC KIDNEY DISEASE TREATED WITH ERYTHROPOIETIN (HCC): Primary | ICD-10-CM

## 2023-06-06 DIAGNOSIS — N18.5 ANEMIA DUE TO STAGE 5 CHRONIC KIDNEY DISEASE TREATED WITH ERYTHROPOIETIN (HCC): Primary | ICD-10-CM

## 2023-06-06 LAB
ALBUMIN SERPL-MCNC: 3.5 G/DL (ref 3.5–5)
ANION GAP SERPL CALC-SCNC: 7 MMOL/L (ref 5–15)
BUN SERPL-MCNC: 81 MG/DL (ref 6–20)
BUN/CREAT SERPL: 12 (ref 12–20)
CALCIUM SERPL-MCNC: 9.4 MG/DL (ref 8.5–10.1)
CHLORIDE SERPL-SCNC: 101 MMOL/L (ref 97–108)
CO2 SERPL-SCNC: 28 MMOL/L (ref 21–32)
CREAT SERPL-MCNC: 6.92 MG/DL (ref 0.55–1.02)
ERYTHROCYTE [DISTWIDTH] IN BLOOD BY AUTOMATED COUNT: 15.4 % (ref 11.5–14.5)
GLUCOSE SERPL-MCNC: 143 MG/DL (ref 65–100)
HCT VFR BLD AUTO: 23.1 % (ref 35–47)
HGB BLD-MCNC: 6.8 G/DL (ref 11.5–16)
IRON SATN MFR SERPL: 48 % (ref 20–50)
IRON SERPL-MCNC: 90 UG/DL (ref 35–150)
MCH RBC QN AUTO: 26 PG (ref 26–34)
MCHC RBC AUTO-ENTMCNC: 29.4 G/DL (ref 30–36.5)
MCV RBC AUTO: 88.2 FL (ref 80–99)
NRBC # BLD: 0 K/UL (ref 0–0.01)
NRBC BLD-RTO: 0 PER 100 WBC
PHOSPHATE SERPL-MCNC: 5.1 MG/DL (ref 2.6–4.7)
PLATELET # BLD AUTO: 146 K/UL (ref 150–400)
PMV BLD AUTO: 11 FL (ref 8.9–12.9)
POTASSIUM SERPL-SCNC: 4.4 MMOL/L (ref 3.5–5.1)
RBC # BLD AUTO: 2.62 M/UL (ref 3.8–5.2)
SODIUM SERPL-SCNC: 136 MMOL/L (ref 136–145)
TIBC SERPL-MCNC: 189 UG/DL (ref 250–450)
WBC # BLD AUTO: 8.5 K/UL (ref 3.6–11)

## 2023-06-06 PROCEDURE — 80069 RENAL FUNCTION PANEL: CPT

## 2023-06-06 PROCEDURE — 83550 IRON BINDING TEST: CPT

## 2023-06-06 PROCEDURE — 96372 THER/PROPH/DIAG INJ SC/IM: CPT

## 2023-06-06 PROCEDURE — 85027 COMPLETE CBC AUTOMATED: CPT

## 2023-06-06 PROCEDURE — 36415 COLL VENOUS BLD VENIPUNCTURE: CPT

## 2023-06-06 PROCEDURE — 6360000002 HC RX W HCPCS: Performed by: INTERNAL MEDICINE

## 2023-06-06 PROCEDURE — 83540 ASSAY OF IRON: CPT

## 2023-06-06 RX ADMIN — EPOETIN ALFA-EPBX 60000 UNITS: 20000 INJECTION, SOLUTION INTRAVENOUS; SUBCUTANEOUS at 12:14

## 2023-06-06 NOTE — PLAN OF CARE
[facility] Chemo Progress Note    Date: 2023    Name: Eduardo Corley    MRN: [id number]         : 1955    Ms. Js Hinojosa Arrived ambulatory and in no distress for Retacrit. Assessment was completed and documented in flowsheets. No acute concerns at this time. Labs drawn peripherally without difficulty, labs drawn and processed. Follow Up: Proceed with treatment    No flowsheet data found. Ms. Chiqui Baca vitals were reviewed. Patient Vitals for the past 12 hrs:   Temp Pulse Resp BP   23 0945 98 °F (36.7 °C) 82 18 (!) 137/48     Dr. Haroon Corrales notified of Hgb 6.8, patient asymptomatic. She is a Jehovah Witness and refuses blood transfusion. Lab results were obtained and reviewed. Labs within parameter for treatment.    Recent Results (from the past 12 hour(s))   CBC    Collection Time: 23 10:11 AM   Result Value Ref Range    WBC 8.5 3.6 - 11.0 K/uL    RBC 2.62 (L) 3.80 - 5.20 M/uL    Hemoglobin 6.8 (L) 11.5 - 16.0 g/dL    Hematocrit 23.1 (L) 35.0 - 47.0 %    MCV 88.2 80.0 - 99.0 FL    MCH 26.0 26.0 - 34.0 PG    MCHC 29.4 (L) 30.0 - 36.5 g/dL    RDW 15.4 (H) 11.5 - 14.5 %    Platelets 007 (L) 891 - 400 K/uL    MPV 11.0 8.9 - 12.9 FL    Nucleated RBCs 0.0 0  WBC    nRBC 0.00 0.00 - 0.01 K/uL   Iron and TIBC    Collection Time: 23 10:11 AM   Result Value Ref Range    Iron 90 35 - 150 ug/dL    TIBC 189 (L) 250 - 450 ug/dL    Iron % Saturation 48 20 - 50 %   Renal Function Panel    Collection Time: 23 10:11 AM   Result Value Ref Range    Sodium 136 136 - 145 mmol/L    Potassium 4.4 3.5 - 5.1 mmol/L    Chloride 101 97 - 108 mmol/L    CO2 28 21 - 32 mmol/L    Anion Gap 7 5 - 15 mmol/L    Glucose 143 (H) 65 - 100 mg/dL    BUN 81 (H) 6 - 20 MG/DL    Creatinine 6.92 (H) 0.55 - 1.02 MG/DL    Bun/Cre Ratio 12 12 - 20      Est, Glom Filt Rate 6 (L) >60 ml/min/1.73m2    Calcium 9.4 8.5 - 10.1 MG/DL    Phosphorus 5.1 (H) 2.6 - 4.7 MG/DL    Albumin 3.5 3.5 - 5.0 g/dL     Medications

## 2023-07-31 ENCOUNTER — HOSPITAL ENCOUNTER (OUTPATIENT)
Facility: HOSPITAL | Age: 68
Setting detail: INFUSION SERIES
End: 2023-07-31
Payer: MEDICAID

## 2023-07-31 VITALS
DIASTOLIC BLOOD PRESSURE: 54 MMHG | RESPIRATION RATE: 17 BRPM | HEART RATE: 76 BPM | TEMPERATURE: 97.8 F | SYSTOLIC BLOOD PRESSURE: 134 MMHG

## 2023-07-31 DIAGNOSIS — D63.1 ANEMIA DUE TO STAGE 5 CHRONIC KIDNEY DISEASE TREATED WITH ERYTHROPOIETIN (HCC): Primary | ICD-10-CM

## 2023-07-31 DIAGNOSIS — N18.5 ANEMIA DUE TO STAGE 5 CHRONIC KIDNEY DISEASE TREATED WITH ERYTHROPOIETIN (HCC): Primary | ICD-10-CM

## 2023-07-31 LAB
BASO+EOS+MONOS # BLD AUTO: 0.6 K/UL (ref 0.2–1.2)
BASO+EOS+MONOS NFR BLD AUTO: 6 % (ref 3.2–16.9)
DIFFERENTIAL METHOD BLD: ABNORMAL
ERYTHROCYTE [DISTWIDTH] IN BLOOD BY AUTOMATED COUNT: 16 % (ref 11.8–15.8)
HCT VFR BLD AUTO: 25.2 % (ref 35–47)
HGB BLD-MCNC: 8 G/DL (ref 11.5–16)
LYMPHOCYTES # BLD: 1.9 K/UL (ref 0.8–3.5)
LYMPHOCYTES NFR BLD: 18 % (ref 12–49)
MCH RBC QN AUTO: 26.1 PG (ref 26–34)
MCHC RBC AUTO-ENTMCNC: 31.7 G/DL (ref 30–36.5)
MCV RBC AUTO: 82.1 FL (ref 80–99)
NEUTS SEG # BLD: 8.2 K/UL (ref 1.8–8)
NEUTS SEG NFR BLD: 77 % (ref 32–75)
PLATELET # BLD AUTO: 165 K/UL (ref 150–400)
RBC # BLD AUTO: 3.07 M/UL (ref 3.8–5.2)
WBC # BLD AUTO: 10.7 K/UL (ref 3.6–11)

## 2023-07-31 PROCEDURE — 6360000002 HC RX W HCPCS: Performed by: INTERNAL MEDICINE

## 2023-07-31 PROCEDURE — 85025 COMPLETE CBC W/AUTO DIFF WBC: CPT

## 2023-07-31 PROCEDURE — 96372 THER/PROPH/DIAG INJ SC/IM: CPT

## 2023-07-31 RX ADMIN — EPOETIN ALFA-EPBX 60000 UNITS: 20000 INJECTION, SOLUTION INTRAVENOUS; SUBCUTANEOUS at 15:18

## 2024-11-12 ENCOUNTER — OFFICE VISIT (OUTPATIENT)
Age: 69
End: 2024-11-12

## 2024-11-12 VITALS
DIASTOLIC BLOOD PRESSURE: 76 MMHG | SYSTOLIC BLOOD PRESSURE: 138 MMHG | BODY MASS INDEX: 42.15 KG/M2 | OXYGEN SATURATION: 94 % | RESPIRATION RATE: 20 BRPM | TEMPERATURE: 98.5 F | HEIGHT: 64 IN | HEART RATE: 97 BPM | WEIGHT: 246.91 LBS

## 2024-11-12 DIAGNOSIS — M25.561 CHRONIC PAIN OF RIGHT KNEE: Primary | ICD-10-CM

## 2024-11-12 DIAGNOSIS — G89.29 CHRONIC PAIN OF RIGHT KNEE: Primary | ICD-10-CM

## 2024-11-12 DIAGNOSIS — M17.11 OSTEOARTHRITIS OF RIGHT KNEE, UNSPECIFIED OSTEOARTHRITIS TYPE: ICD-10-CM

## 2024-11-12 NOTE — PROGRESS NOTES
2024   Tiera Ramon (: 1955) is a 69 y.o. female, New patient, here for evaluation of the following chief complaint(s):  Other (Patient reports right knee pain. Fell and injured it 2022 and has since fallen 8 times and 4 have been on her right knee. )     ASSESSMENT/PLAN:  Below is the assessment and plan developed based on review of pertinent history, physical exam, labs, studies, and medications.  1. Chronic pain of right knee  -     XR KNEE RIGHT (3 VIEWS); Future  2. Osteoarthritis of right knee, unspecified osteoarthritis type  -     External Referral To Orthopedic Surgery    Xray - prelim read:   severe OA right knee medial> lateral.  Cystic changes in medial  distal femur and medial proximal tibia     Handout given with care instructions  2. OTC for symptom management. Increase fluid intake, ensure adequate nutritional intake.  3. Follow up with PCP as needed.  4. Go to ED with development of any acute symptoms.     Follow up:  No follow-ups on file.  Follow up immediately for any new, worsening or changes or if symptoms are not improving over the next 5-7 days.     SUBJECTIVE/OBJECTIVE:  HPI       Other (Patient reports right knee pain. Fell and injured it 2022 and has since fallen 8 times and 4 have been on her right knee. )      No results found for any visits on 24.    No results found for this visit on 24.  XR Results (most recent):  @BSHSILASTIMGCAT(ESM2688:1)@         Review of Systems   Constitutional: Negative.    Musculoskeletal:  Positive for arthralgias and joint swelling.         Physical Exam  Constitutional:       Appearance: Normal appearance.   Cardiovascular:      Rate and Rhythm: Normal rate and regular rhythm.      Pulses: Normal pulses.      Heart sounds: Normal heart sounds.   Pulmonary:      Effort: Pulmonary effort is normal.      Breath sounds: Normal breath sounds.   Musculoskeletal:      Right knee: Swelling and bony tenderness present. No erythema

## 2025-01-15 ENCOUNTER — APPOINTMENT (OUTPATIENT)
Facility: HOSPITAL | Age: 70
DRG: 871 | End: 2025-01-15
Payer: MEDICARE

## 2025-01-15 ENCOUNTER — HOSPITAL ENCOUNTER (INPATIENT)
Facility: HOSPITAL | Age: 70
LOS: 13 days | Discharge: HOME HEALTH | DRG: 871 | End: 2025-01-29
Attending: STUDENT IN AN ORGANIZED HEALTH CARE EDUCATION/TRAINING PROGRAM | Admitting: STUDENT IN AN ORGANIZED HEALTH CARE EDUCATION/TRAINING PROGRAM
Payer: MEDICARE

## 2025-01-15 DIAGNOSIS — R10.31 RIGHT LOWER QUADRANT ABDOMINAL PAIN: Primary | ICD-10-CM

## 2025-01-15 LAB
ALBUMIN SERPL-MCNC: 3 G/DL (ref 3.5–5)
ALBUMIN/GLOB SERPL: 0.8 (ref 1.1–2.2)
ALP SERPL-CCNC: 150 U/L (ref 45–117)
ALT SERPL-CCNC: 16 U/L (ref 12–78)
ANION GAP SERPL CALC-SCNC: 6 MMOL/L (ref 2–12)
AST SERPL-CCNC: 9 U/L (ref 15–37)
BASOPHILS # BLD: 0.05 K/UL (ref 0–0.1)
BASOPHILS NFR BLD: 0.5 % (ref 0–1)
BILIRUB SERPL-MCNC: 0.4 MG/DL (ref 0.2–1)
BUN SERPL-MCNC: 27 MG/DL (ref 6–20)
BUN/CREAT SERPL: 7 (ref 12–20)
CALCIUM SERPL-MCNC: 9.2 MG/DL (ref 8.5–10.1)
CHLORIDE SERPL-SCNC: 94 MMOL/L (ref 97–108)
CO2 SERPL-SCNC: 32 MMOL/L (ref 21–32)
CREAT SERPL-MCNC: 4.08 MG/DL (ref 0.55–1.02)
DIFFERENTIAL METHOD BLD: ABNORMAL
EOSINOPHIL # BLD: 0.1 K/UL (ref 0–0.4)
EOSINOPHIL NFR BLD: 1 % (ref 0–7)
ERYTHROCYTE [DISTWIDTH] IN BLOOD BY AUTOMATED COUNT: 14.5 % (ref 11.5–14.5)
GLOBULIN SER CALC-MCNC: 3.8 G/DL (ref 2–4)
GLUCOSE BLD STRIP.AUTO-MCNC: 127 MG/DL (ref 65–117)
GLUCOSE SERPL-MCNC: 534 MG/DL (ref 65–100)
HCT VFR BLD AUTO: 35.2 % (ref 35–47)
HGB BLD-MCNC: 10.6 G/DL (ref 11.5–16)
IMM GRANULOCYTES # BLD AUTO: 0.03 K/UL (ref 0–0.04)
IMM GRANULOCYTES NFR BLD AUTO: 0.3 % (ref 0–0.5)
LIPASE SERPL-CCNC: 38 U/L (ref 13–75)
LYMPHOCYTES # BLD: 1.18 K/UL (ref 0.8–3.5)
LYMPHOCYTES NFR BLD: 12.2 % (ref 12–49)
MCH RBC QN AUTO: 28 PG (ref 26–34)
MCHC RBC AUTO-ENTMCNC: 30.1 G/DL (ref 30–36.5)
MCV RBC AUTO: 92.9 FL (ref 80–99)
MONOCYTES # BLD: 0.58 K/UL (ref 0–1)
MONOCYTES NFR BLD: 6 % (ref 5–13)
NEUTS SEG # BLD: 7.76 K/UL (ref 1.8–8)
NEUTS SEG NFR BLD: 80 % (ref 32–75)
NRBC # BLD: 0 K/UL (ref 0–0.01)
NRBC BLD-RTO: 0 PER 100 WBC
PLATELET # BLD AUTO: 138 K/UL (ref 150–400)
PMV BLD AUTO: 11.6 FL (ref 8.9–12.9)
POTASSIUM SERPL-SCNC: 3.9 MMOL/L (ref 3.5–5.1)
PROT SERPL-MCNC: 6.8 G/DL (ref 6.4–8.2)
RBC # BLD AUTO: 3.79 M/UL (ref 3.8–5.2)
RBC MORPH BLD: ABNORMAL
SERVICE CMNT-IMP: ABNORMAL
SODIUM SERPL-SCNC: 132 MMOL/L (ref 136–145)
WBC # BLD AUTO: 9.7 K/UL (ref 3.6–11)

## 2025-01-15 PROCEDURE — 99285 EMERGENCY DEPT VISIT HI MDM: CPT

## 2025-01-15 PROCEDURE — 82962 GLUCOSE BLOOD TEST: CPT

## 2025-01-15 PROCEDURE — 6370000000 HC RX 637 (ALT 250 FOR IP)

## 2025-01-15 PROCEDURE — 96374 THER/PROPH/DIAG INJ IV PUSH: CPT

## 2025-01-15 PROCEDURE — 74177 CT ABD & PELVIS W/CONTRAST: CPT

## 2025-01-15 PROCEDURE — 36415 COLL VENOUS BLD VENIPUNCTURE: CPT

## 2025-01-15 PROCEDURE — 85025 COMPLETE CBC W/AUTO DIFF WBC: CPT

## 2025-01-15 PROCEDURE — 80053 COMPREHEN METABOLIC PANEL: CPT

## 2025-01-15 PROCEDURE — 93005 ELECTROCARDIOGRAM TRACING: CPT | Performed by: STUDENT IN AN ORGANIZED HEALTH CARE EDUCATION/TRAINING PROGRAM

## 2025-01-15 PROCEDURE — 6360000004 HC RX CONTRAST MEDICATION: Performed by: RADIOLOGY

## 2025-01-15 PROCEDURE — 83690 ASSAY OF LIPASE: CPT

## 2025-01-15 RX ORDER — FENTANYL CITRATE 50 UG/ML
25 INJECTION, SOLUTION INTRAMUSCULAR; INTRAVENOUS
Status: DISCONTINUED | OUTPATIENT
Start: 2025-01-15 | End: 2025-01-16

## 2025-01-15 RX ORDER — IOPAMIDOL 755 MG/ML
100 INJECTION, SOLUTION INTRAVASCULAR
Status: COMPLETED | OUTPATIENT
Start: 2025-01-15 | End: 2025-01-15

## 2025-01-15 RX ORDER — OXYCODONE AND ACETAMINOPHEN 5; 325 MG/1; MG/1
1 TABLET ORAL
Status: COMPLETED | OUTPATIENT
Start: 2025-01-15 | End: 2025-01-15

## 2025-01-15 RX ADMIN — IOPAMIDOL 100 ML: 755 INJECTION, SOLUTION INTRAVENOUS at 22:22

## 2025-01-15 RX ADMIN — INSULIN HUMAN 10 UNITS: 100 INJECTION, SOLUTION PARENTERAL at 21:57

## 2025-01-15 RX ADMIN — OXYCODONE HYDROCHLORIDE AND ACETAMINOPHEN 1 TABLET: 5; 325 TABLET ORAL at 21:09

## 2025-01-15 ASSESSMENT — LIFESTYLE VARIABLES
HOW OFTEN DO YOU HAVE A DRINK CONTAINING ALCOHOL: NEVER
HOW MANY STANDARD DRINKS CONTAINING ALCOHOL DO YOU HAVE ON A TYPICAL DAY: PATIENT DOES NOT DRINK

## 2025-01-16 ENCOUNTER — APPOINTMENT (OUTPATIENT)
Facility: HOSPITAL | Age: 70
DRG: 871 | End: 2025-01-16
Payer: MEDICARE

## 2025-01-16 PROBLEM — N39.0 COMPLICATED UTI (URINARY TRACT INFECTION): Status: ACTIVE | Noted: 2025-01-16

## 2025-01-16 LAB
ANION GAP SERPL CALC-SCNC: 7 MMOL/L (ref 2–12)
APPEARANCE UR: ABNORMAL
BACTERIA URNS QL MICRO: ABNORMAL /HPF
BASOPHILS # BLD: 0.05 K/UL (ref 0–0.1)
BASOPHILS NFR BLD: 0.5 % (ref 0–1)
BILIRUB UR QL: NEGATIVE
BUN SERPL-MCNC: 29 MG/DL (ref 6–20)
BUN/CREAT SERPL: 7 (ref 12–20)
CALCIUM SERPL-MCNC: 8.4 MG/DL (ref 8.5–10.1)
CHLORIDE SERPL-SCNC: 100 MMOL/L (ref 97–108)
CO2 SERPL-SCNC: 26 MMOL/L (ref 21–32)
COLOR UR: ABNORMAL
CREAT SERPL-MCNC: 4.14 MG/DL (ref 0.55–1.02)
DIFFERENTIAL METHOD BLD: ABNORMAL
EOSINOPHIL # BLD: 0.18 K/UL (ref 0–0.4)
EOSINOPHIL NFR BLD: 1.7 % (ref 0–7)
EPITH CASTS URNS QL MICRO: ABNORMAL /LPF
ERYTHROCYTE [DISTWIDTH] IN BLOOD BY AUTOMATED COUNT: 14.4 % (ref 11.5–14.5)
GLUCOSE BLD STRIP.AUTO-MCNC: 158 MG/DL (ref 65–117)
GLUCOSE BLD STRIP.AUTO-MCNC: 178 MG/DL (ref 65–117)
GLUCOSE BLD STRIP.AUTO-MCNC: 215 MG/DL (ref 65–117)
GLUCOSE BLD STRIP.AUTO-MCNC: 222 MG/DL (ref 65–117)
GLUCOSE BLD STRIP.AUTO-MCNC: 262 MG/DL (ref 65–117)
GLUCOSE BLD STRIP.AUTO-MCNC: 277 MG/DL (ref 65–117)
GLUCOSE SERPL-MCNC: 205 MG/DL (ref 65–100)
GLUCOSE UR STRIP.AUTO-MCNC: >1000 MG/DL
GRAN CASTS URNS QL MICRO: ABNORMAL /LPF
HCT VFR BLD AUTO: 35.4 % (ref 35–47)
HGB BLD-MCNC: 10.6 G/DL (ref 11.5–16)
HGB UR QL STRIP: ABNORMAL
IMM GRANULOCYTES # BLD AUTO: 0.04 K/UL (ref 0–0.04)
IMM GRANULOCYTES NFR BLD AUTO: 0.4 % (ref 0–0.5)
KETONES UR QL STRIP.AUTO: NEGATIVE MG/DL
LEUKOCYTE ESTERASE UR QL STRIP.AUTO: ABNORMAL
LYMPHOCYTES # BLD: 1.8 K/UL (ref 0.8–3.5)
LYMPHOCYTES NFR BLD: 16.7 % (ref 12–49)
MCH RBC QN AUTO: 27.5 PG (ref 26–34)
MCHC RBC AUTO-ENTMCNC: 29.9 G/DL (ref 30–36.5)
MCV RBC AUTO: 91.9 FL (ref 80–99)
MONOCYTES # BLD: 0.79 K/UL (ref 0–1)
MONOCYTES NFR BLD: 7.3 % (ref 5–13)
NEUTS SEG # BLD: 7.95 K/UL (ref 1.8–8)
NEUTS SEG NFR BLD: 73.4 % (ref 32–75)
NITRITE UR QL STRIP.AUTO: NEGATIVE
NRBC # BLD: 0 K/UL (ref 0–0.01)
NRBC BLD-RTO: 0 PER 100 WBC
PH UR STRIP: 7.5 (ref 5–8)
PLATELET # BLD AUTO: 158 K/UL (ref 150–400)
PMV BLD AUTO: 12.4 FL (ref 8.9–12.9)
POTASSIUM SERPL-SCNC: 4.1 MMOL/L (ref 3.5–5.1)
PROT UR STRIP-MCNC: 100 MG/DL
RBC # BLD AUTO: 3.85 M/UL (ref 3.8–5.2)
RBC #/AREA URNS HPF: ABNORMAL /HPF (ref 0–5)
SERVICE CMNT-IMP: ABNORMAL
SODIUM SERPL-SCNC: 133 MMOL/L (ref 136–145)
SP GR UR REFRACTOMETRY: 1.02
URINE CULTURE IF INDICATED: ABNORMAL
UROBILINOGEN UR QL STRIP.AUTO: 0.2 EU/DL (ref 0.2–1)
WBC # BLD AUTO: 10.8 K/UL (ref 3.6–11)
WBC URNS QL MICRO: >100 /HPF (ref 0–4)
YEAST URNS QL MICRO: PRESENT

## 2025-01-16 PROCEDURE — 1100000003 HC PRIVATE W/ TELEMETRY

## 2025-01-16 PROCEDURE — 81001 URINALYSIS AUTO W/SCOPE: CPT

## 2025-01-16 PROCEDURE — 6370000000 HC RX 637 (ALT 250 FOR IP): Performed by: INTERNAL MEDICINE

## 2025-01-16 PROCEDURE — 76770 US EXAM ABDO BACK WALL COMP: CPT

## 2025-01-16 PROCEDURE — 80048 BASIC METABOLIC PNL TOTAL CA: CPT

## 2025-01-16 PROCEDURE — 94760 N-INVAS EAR/PLS OXIMETRY 1: CPT

## 2025-01-16 PROCEDURE — 2500000003 HC RX 250 WO HCPCS: Performed by: STUDENT IN AN ORGANIZED HEALTH CARE EDUCATION/TRAINING PROGRAM

## 2025-01-16 PROCEDURE — 82962 GLUCOSE BLOOD TEST: CPT

## 2025-01-16 PROCEDURE — 85025 COMPLETE CBC W/AUTO DIFF WBC: CPT

## 2025-01-16 PROCEDURE — 6360000002 HC RX W HCPCS: Performed by: STUDENT IN AN ORGANIZED HEALTH CARE EDUCATION/TRAINING PROGRAM

## 2025-01-16 PROCEDURE — 87086 URINE CULTURE/COLONY COUNT: CPT

## 2025-01-16 PROCEDURE — 6370000000 HC RX 637 (ALT 250 FOR IP): Performed by: STUDENT IN AN ORGANIZED HEALTH CARE EDUCATION/TRAINING PROGRAM

## 2025-01-16 PROCEDURE — 36415 COLL VENOUS BLD VENIPUNCTURE: CPT

## 2025-01-16 PROCEDURE — 94640 AIRWAY INHALATION TREATMENT: CPT

## 2025-01-16 PROCEDURE — 2700000000 HC OXYGEN THERAPY PER DAY

## 2025-01-16 RX ORDER — HEPARIN SODIUM 5000 [USP'U]/ML
5000 INJECTION, SOLUTION INTRAVENOUS; SUBCUTANEOUS EVERY 8 HOURS SCHEDULED
Status: DISCONTINUED | OUTPATIENT
Start: 2025-01-16 | End: 2025-01-29 | Stop reason: HOSPADM

## 2025-01-16 RX ORDER — ATORVASTATIN CALCIUM 40 MG/1
40 TABLET, FILM COATED ORAL DAILY
Status: DISCONTINUED | OUTPATIENT
Start: 2025-01-16 | End: 2025-01-29 | Stop reason: HOSPADM

## 2025-01-16 RX ORDER — PANTOPRAZOLE SODIUM 40 MG/1
40 TABLET, DELAYED RELEASE ORAL DAILY
COMMUNITY

## 2025-01-16 RX ORDER — BUMETANIDE 1 MG/1
2 TABLET ORAL 2 TIMES DAILY
Status: DISCONTINUED | OUTPATIENT
Start: 2025-01-16 | End: 2025-01-22

## 2025-01-16 RX ORDER — POLYETHYLENE GLYCOL 3350 17 G/17G
17 POWDER, FOR SOLUTION ORAL DAILY PRN
Status: DISCONTINUED | OUTPATIENT
Start: 2025-01-16 | End: 2025-01-29 | Stop reason: HOSPADM

## 2025-01-16 RX ORDER — MONTELUKAST SODIUM 10 MG/1
10 TABLET ORAL DAILY
Status: DISCONTINUED | OUTPATIENT
Start: 2025-01-16 | End: 2025-01-29 | Stop reason: HOSPADM

## 2025-01-16 RX ORDER — HYDRALAZINE HYDROCHLORIDE 50 MG/1
100 TABLET, FILM COATED ORAL 3 TIMES DAILY
Status: DISCONTINUED | OUTPATIENT
Start: 2025-01-16 | End: 2025-01-29 | Stop reason: HOSPADM

## 2025-01-16 RX ORDER — SODIUM CHLORIDE 0.9 % (FLUSH) 0.9 %
5-40 SYRINGE (ML) INJECTION PRN
Status: DISCONTINUED | OUTPATIENT
Start: 2025-01-16 | End: 2025-01-29 | Stop reason: HOSPADM

## 2025-01-16 RX ORDER — INSULIN LISPRO 100 [IU]/ML
0-8 INJECTION, SOLUTION INTRAVENOUS; SUBCUTANEOUS
Status: DISCONTINUED | OUTPATIENT
Start: 2025-01-16 | End: 2025-01-29 | Stop reason: HOSPADM

## 2025-01-16 RX ORDER — ACETAMINOPHEN 650 MG/1
650 SUPPOSITORY RECTAL EVERY 6 HOURS PRN
Status: DISCONTINUED | OUTPATIENT
Start: 2025-01-16 | End: 2025-01-29 | Stop reason: HOSPADM

## 2025-01-16 RX ORDER — ONDANSETRON 2 MG/ML
4 INJECTION INTRAMUSCULAR; INTRAVENOUS EVERY 6 HOURS PRN
Status: DISCONTINUED | OUTPATIENT
Start: 2025-01-16 | End: 2025-01-29 | Stop reason: HOSPADM

## 2025-01-16 RX ORDER — PAROXETINE 20 MG/1
30 TABLET, FILM COATED ORAL DAILY
Status: DISCONTINUED | OUTPATIENT
Start: 2025-01-16 | End: 2025-01-29 | Stop reason: HOSPADM

## 2025-01-16 RX ORDER — INSULIN GLARGINE 100 [IU]/ML
30 INJECTION, SOLUTION SUBCUTANEOUS DAILY
Status: DISCONTINUED | OUTPATIENT
Start: 2025-01-16 | End: 2025-01-29 | Stop reason: HOSPADM

## 2025-01-16 RX ORDER — SODIUM CHLORIDE 9 MG/ML
INJECTION, SOLUTION INTRAVENOUS PRN
Status: DISCONTINUED | OUTPATIENT
Start: 2025-01-16 | End: 2025-01-29 | Stop reason: HOSPADM

## 2025-01-16 RX ORDER — GLUCAGON 1 MG/ML
1 KIT INJECTION PRN
Status: DISCONTINUED | OUTPATIENT
Start: 2025-01-16 | End: 2025-01-29 | Stop reason: HOSPADM

## 2025-01-16 RX ORDER — LOSARTAN POTASSIUM 50 MG/1
50 TABLET ORAL DAILY
Status: DISCONTINUED | OUTPATIENT
Start: 2025-01-16 | End: 2025-01-29 | Stop reason: HOSPADM

## 2025-01-16 RX ORDER — ALBUTEROL SULFATE 0.83 MG/ML
2.5 SOLUTION RESPIRATORY (INHALATION) EVERY 4 HOURS PRN
Status: DISCONTINUED | OUTPATIENT
Start: 2025-01-16 | End: 2025-01-29 | Stop reason: HOSPADM

## 2025-01-16 RX ORDER — ASPIRIN 81 MG/1
81 TABLET ORAL DAILY
Status: DISCONTINUED | OUTPATIENT
Start: 2025-01-16 | End: 2025-01-29 | Stop reason: HOSPADM

## 2025-01-16 RX ORDER — SODIUM CHLORIDE 0.9 % (FLUSH) 0.9 %
5-40 SYRINGE (ML) INJECTION EVERY 12 HOURS SCHEDULED
Status: DISCONTINUED | OUTPATIENT
Start: 2025-01-16 | End: 2025-01-29 | Stop reason: HOSPADM

## 2025-01-16 RX ORDER — MIDODRINE HYDROCHLORIDE 5 MG/1
5 TABLET ORAL 3 TIMES DAILY PRN
Status: DISCONTINUED | OUTPATIENT
Start: 2025-01-16 | End: 2025-01-19

## 2025-01-16 RX ORDER — ACETAMINOPHEN 325 MG/1
650 TABLET ORAL EVERY 6 HOURS PRN
Status: DISCONTINUED | OUTPATIENT
Start: 2025-01-16 | End: 2025-01-29 | Stop reason: HOSPADM

## 2025-01-16 RX ORDER — ONDANSETRON 4 MG/1
4 TABLET, ORALLY DISINTEGRATING ORAL EVERY 8 HOURS PRN
Status: DISCONTINUED | OUTPATIENT
Start: 2025-01-16 | End: 2025-01-29 | Stop reason: HOSPADM

## 2025-01-16 RX ORDER — METHOXY POLYETHYLENE GLYCOL-EPOETIN BETA 30 UG/.3ML
30 INJECTION, SOLUTION INTRAVENOUS
COMMUNITY

## 2025-01-16 RX ORDER — DICYCLOMINE HYDROCHLORIDE 10 MG/1
10 CAPSULE ORAL 4 TIMES DAILY PRN
Status: DISCONTINUED | OUTPATIENT
Start: 2025-01-16 | End: 2025-01-29 | Stop reason: HOSPADM

## 2025-01-16 RX ORDER — SEVELAMER CARBONATE FOR ORAL SUSPENSION 2400 MG/1
2.4 POWDER, FOR SUSPENSION ORAL
COMMUNITY

## 2025-01-16 RX ORDER — QUETIAPINE FUMARATE 25 MG/1
25 TABLET, FILM COATED ORAL 3 TIMES DAILY
Status: DISCONTINUED | OUTPATIENT
Start: 2025-01-16 | End: 2025-01-29 | Stop reason: HOSPADM

## 2025-01-16 RX ORDER — FAMOTIDINE 20 MG/1
10 TABLET, FILM COATED ORAL 2 TIMES DAILY
Status: DISCONTINUED | OUTPATIENT
Start: 2025-01-16 | End: 2025-01-16

## 2025-01-16 RX ORDER — FLUTICASONE PROPIONATE AND SALMETEROL 250; 50 UG/1; UG/1
1 POWDER RESPIRATORY (INHALATION) EVERY 12 HOURS
COMMUNITY

## 2025-01-16 RX ORDER — FAMOTIDINE 20 MG/1
10 TABLET, FILM COATED ORAL DAILY
Status: DISCONTINUED | OUTPATIENT
Start: 2025-01-17 | End: 2025-01-29 | Stop reason: HOSPADM

## 2025-01-16 RX ORDER — ENOXAPARIN SODIUM 100 MG/ML
30 INJECTION SUBCUTANEOUS DAILY
Status: CANCELLED | OUTPATIENT
Start: 2025-01-16

## 2025-01-16 RX ORDER — CARVEDILOL 6.25 MG/1
25 TABLET ORAL 2 TIMES DAILY WITH MEALS
Status: DISCONTINUED | OUTPATIENT
Start: 2025-01-16 | End: 2025-01-23

## 2025-01-16 RX ORDER — CALCITRIOL 0.25 UG/1
0.75 CAPSULE, LIQUID FILLED ORAL
COMMUNITY

## 2025-01-16 RX ORDER — FERROUS SULFATE 325(65) MG
325 TABLET ORAL 2 TIMES DAILY WITH MEALS
Status: DISCONTINUED | OUTPATIENT
Start: 2025-01-16 | End: 2025-01-29 | Stop reason: HOSPADM

## 2025-01-16 RX ORDER — DEXTROSE MONOHYDRATE 100 MG/ML
INJECTION, SOLUTION INTRAVENOUS CONTINUOUS PRN
Status: DISCONTINUED | OUTPATIENT
Start: 2025-01-16 | End: 2025-01-29 | Stop reason: HOSPADM

## 2025-01-16 RX ADMIN — MONTELUKAST 10 MG: 10 TABLET, FILM COATED ORAL at 09:08

## 2025-01-16 RX ADMIN — ASPIRIN 81 MG: 81 TABLET, COATED ORAL at 09:02

## 2025-01-16 RX ADMIN — BUMETANIDE 2 MG: 1 TABLET ORAL at 21:31

## 2025-01-16 RX ADMIN — DICYCLOMINE HYDROCHLORIDE 10 MG: 10 CAPSULE ORAL at 11:29

## 2025-01-16 RX ADMIN — QUETIAPINE FUMARATE 25 MG: 25 TABLET ORAL at 15:05

## 2025-01-16 RX ADMIN — PAROXETINE HYDROCHLORIDE 30 MG: 20 TABLET, FILM COATED ORAL at 09:03

## 2025-01-16 RX ADMIN — HEPARIN SODIUM 5000 UNITS: 5000 INJECTION INTRAVENOUS; SUBCUTANEOUS at 14:59

## 2025-01-16 RX ADMIN — FAMOTIDINE 10 MG: 20 TABLET, FILM COATED ORAL at 03:16

## 2025-01-16 RX ADMIN — INSULIN GLARGINE 30 UNITS: 100 INJECTION, SOLUTION SUBCUTANEOUS at 09:04

## 2025-01-16 RX ADMIN — INSULIN LISPRO 4 UNITS: 100 INJECTION, SOLUTION INTRAVENOUS; SUBCUTANEOUS at 13:28

## 2025-01-16 RX ADMIN — FERROUS SULFATE TAB 325 MG (65 MG ELEMENTAL FE) 325 MG: 325 (65 FE) TAB at 18:34

## 2025-01-16 RX ADMIN — SODIUM CHLORIDE, PRESERVATIVE FREE 10 ML: 5 INJECTION INTRAVENOUS at 21:32

## 2025-01-16 RX ADMIN — QUETIAPINE FUMARATE 25 MG: 25 TABLET ORAL at 09:06

## 2025-01-16 RX ADMIN — MIDODRINE HYDROCHLORIDE 5 MG: 5 TABLET ORAL at 13:35

## 2025-01-16 RX ADMIN — FERROUS SULFATE TAB 325 MG (65 MG ELEMENTAL FE) 325 MG: 325 (65 FE) TAB at 09:06

## 2025-01-16 RX ADMIN — HEPARIN SODIUM 5000 UNITS: 5000 INJECTION INTRAVENOUS; SUBCUTANEOUS at 06:26

## 2025-01-16 RX ADMIN — WATER 2000 MG: 1 INJECTION INTRAMUSCULAR; INTRAVENOUS; SUBCUTANEOUS at 03:16

## 2025-01-16 RX ADMIN — ARFORMOTEROL TARTRATE: 15 SOLUTION RESPIRATORY (INHALATION) at 08:18

## 2025-01-16 RX ADMIN — INSULIN LISPRO 2 UNITS: 100 INJECTION, SOLUTION INTRAVENOUS; SUBCUTANEOUS at 22:50

## 2025-01-16 RX ADMIN — BUMETANIDE 2 MG: 1 TABLET ORAL at 03:19

## 2025-01-16 RX ADMIN — INSULIN LISPRO 2 UNITS: 100 INJECTION, SOLUTION INTRAVENOUS; SUBCUTANEOUS at 09:15

## 2025-01-16 RX ADMIN — HEPARIN SODIUM 5000 UNITS: 5000 INJECTION INTRAVENOUS; SUBCUTANEOUS at 21:31

## 2025-01-16 RX ADMIN — CARVEDILOL 25 MG: 12.5 TABLET, FILM COATED ORAL at 09:02

## 2025-01-16 RX ADMIN — ATORVASTATIN CALCIUM 40 MG: 40 TABLET, FILM COATED ORAL at 09:03

## 2025-01-16 RX ADMIN — ARFORMOTEROL TARTRATE: 15 SOLUTION RESPIRATORY (INHALATION) at 21:51

## 2025-01-16 RX ADMIN — FAMOTIDINE 10 MG: 20 TABLET, FILM COATED ORAL at 09:03

## 2025-01-16 RX ADMIN — QUETIAPINE FUMARATE 25 MG: 25 TABLET ORAL at 21:31

## 2025-01-16 RX ADMIN — BUMETANIDE 2 MG: 1 TABLET ORAL at 09:08

## 2025-01-16 RX ADMIN — SODIUM CHLORIDE, PRESERVATIVE FREE 10 ML: 5 INJECTION INTRAVENOUS at 09:05

## 2025-01-16 RX ADMIN — INSULIN LISPRO 4 UNITS: 100 INJECTION, SOLUTION INTRAVENOUS; SUBCUTANEOUS at 18:34

## 2025-01-16 RX ADMIN — ONDANSETRON 4 MG: 2 INJECTION INTRAMUSCULAR; INTRAVENOUS at 11:28

## 2025-01-16 ASSESSMENT — PAIN DESCRIPTION - DESCRIPTORS
DESCRIPTORS: DULL
DESCRIPTORS: SHARP;OTHER (COMMENT)
DESCRIPTORS: DULL

## 2025-01-16 ASSESSMENT — PAIN SCALES - GENERAL
PAINLEVEL_OUTOF10: 0
PAINLEVEL_OUTOF10: 4
PAINLEVEL_OUTOF10: 0
PAINLEVEL_OUTOF10: 2
PAINLEVEL_OUTOF10: 4

## 2025-01-16 ASSESSMENT — PAIN DESCRIPTION - LOCATION
LOCATION: ABDOMEN
LOCATION: ABDOMEN
LOCATION: BACK;ABDOMEN

## 2025-01-16 ASSESSMENT — PAIN DESCRIPTION - PAIN TYPE: TYPE: ACUTE PAIN

## 2025-01-16 ASSESSMENT — PAIN DESCRIPTION - ORIENTATION: ORIENTATION: LOWER;RIGHT

## 2025-01-16 NOTE — H&P
Hospitalist Admission Note    NAME:  Tiera Ramon   :  1955   MRN:  [id number]     Date/Time:  2025 2:14 AM    Patient PCP: None, None    ______________________________________________________________________  Given the patient's current clinical presentation, I have a high level of concern for decompensation if discharged from the emergency department.  Complex decision making was performed, which includes reviewing the patient's available past medical records, laboratory results, and x-ray films.       My assessment of this patient's clinical condition and my plan of care is as follows.    Assessment / Plan:    Active Problems:  Complicated UTI  ESRD on MWF IHD  Multiple renal hypodensities of unclear significance  Essential hypertension  Hyperlipidemia  Diabetes mellitus  COPD  MDD, UNA  Obesity class III by BMI 42.05    Plan:  Complicated UTI  ESRD on MWF IHD  Multiple renal hypodensities of unclear significance  Admit to telemetry monitoring  Nephrology consulted, greatly appreciate their expertise  Obtain renal ultrasound to further investigate masses  -No recommendation for MRI with contrast however renal function would preclude gadolinium administration  Start empiric ceftriaxone  Follow urine cultures adjust antibiotics as indicated    Essential hypertension  Hyperlipidemia  Continue PTA aspirin, atorvastatin, Coreg, hydralazine, losartan    Diabetes mellitus  Continue PTA glargine 30 units daily  Corrective coverage insulin  Accu-Cheks  Diabetic diet  Hypoglycemia protocol in place    COPD  Formally substitution arformoterol budesonide nebulizer  Continue PTA montelukast  As needed albuterol nebulizer    MDD, UNA  Continue PTA Paxil, Seroquel    Obesity class III by BMI 42.05  Recommend medically assisted weight loss in outpatient setting    Medical Decision Making:   I personally reviewed labs: Yes, as listed below  I personally reviewed imaging: CT abdomen pelvis  Toxic drug

## 2025-01-16 NOTE — ED NOTES
Report received from ASPEN Gauthier. Reviewed reason for patient arrival, vitals, labs, medications, orders, procedures, results, pending orders/results and current plan for disposition. Questions were asked and answered prior to departure.

## 2025-01-16 NOTE — DISCHARGE INSTRUCTIONS
Please follow up with PCP and urology.  Please discuss with PCP about pulmonary nodule and repeat CT scan

## 2025-01-16 NOTE — ED NOTES
Report given to ASPEN Vázquez. Nurse was informed of reason for arrival, vitals, labs, medications, orders, procedures, results, anything left pending and current plan of action. Questions were asked and received prior to departure from the patient.

## 2025-01-16 NOTE — ED PROVIDER NOTES
[facility] EMERGENCY DEPARTMENT  EMERGENCY DEPARTMENT ENCOUNTER       Pt Name: Tiera Ramon  MRN: [id number]  Birthdate 1955  Date of Evaluation: 1/15/2025  Provider: DA Schofield NP   PCP: None, None  Note Started: 12:47 AM 1/15/25     CHIEF COMPLAINT       Chief Complaint   Patient presents with    Abdominal Pain     BIBEMS from home for RLQ abdominal pain starting after HD today        HISTORY OF PRESENT ILLNESS: 1 or more elements      History From: Patient  HPI Limitations None     Tiera Ramon is a 69 y.o. female with PMHx of asthma, COPD, diabetes, diastolic heart failure, ESRD on dialysis, hypertension, and pulmonary hypertension who presents to the ED today for concerns regarding severe RLQ abdominal pain s/p hemodialysis today.  Patient states she sees [facility] dialysis center M/W/F. Went earlier today.  Patient has LUE fistula.  Patient still makes urine 2-3 times daily.  Denies fevers, chills, chest pain, SOB, cough, runny nose, sore throat, flank pain, dysuria, frequency, hesitancy, odor.  Patient's had cholecystectomy, but not an appendectomy.      See MDM Documentation for further HPI and PMHx     Nursing Notes were all reviewed and agreed with or any disagreements were addressed in the HPI.     REVIEW OF SYSTEMS      Review of Systems     Positives and Pertinent negatives as per HPI.    PAST HISTORY     Past Medical History:  Past Medical History:   Diagnosis Date    Asthma     COPD (chronic obstructive pulmonary disease) (HCC)     Diabetes (HCC)     Diastolic heart failure (HCC)     ESRD (end stage renal disease) on dialysis (HCC)     Hypertension     Pulmonary HTN (HCC)     Sleep apnea     no CPAP - waiting for it to be delivered       Past Surgical History:  Past Surgical History:   Procedure Laterality Date    CHOLECYSTECTOMY      GYN          HEENT      tonsillectomy    HEENT      deviated septum repair       Family History:  Family History   Problem

## 2025-01-17 LAB
ANION GAP SERPL CALC-SCNC: 9 MMOL/L (ref 2–12)
BACTERIA SPEC CULT: NORMAL
BASOPHILS # BLD: 0.08 K/UL (ref 0–0.1)
BASOPHILS NFR BLD: 0.7 % (ref 0–1)
BUN SERPL-MCNC: 43 MG/DL (ref 6–20)
BUN/CREAT SERPL: 7 (ref 12–20)
CALCIUM SERPL-MCNC: 9.4 MG/DL (ref 8.5–10.1)
CHLORIDE SERPL-SCNC: 96 MMOL/L (ref 97–108)
CO2 SERPL-SCNC: 28 MMOL/L (ref 21–32)
CREAT SERPL-MCNC: 6.06 MG/DL (ref 0.55–1.02)
DIFFERENTIAL METHOD BLD: ABNORMAL
EKG ATRIAL RATE: 106 BPM
EKG DIAGNOSIS: NORMAL
EKG P AXIS: 52 DEGREES
EKG P-R INTERVAL: 182 MS
EKG Q-T INTERVAL: 376 MS
EKG QRS DURATION: 114 MS
EKG QTC CALCULATION (BAZETT): 499 MS
EKG R AXIS: -57 DEGREES
EKG T AXIS: 72 DEGREES
EKG VENTRICULAR RATE: 106 BPM
EOSINOPHIL # BLD: 0.23 K/UL (ref 0–0.4)
EOSINOPHIL NFR BLD: 2 % (ref 0–7)
ERYTHROCYTE [DISTWIDTH] IN BLOOD BY AUTOMATED COUNT: 14.5 % (ref 11.5–14.5)
GLUCOSE BLD STRIP.AUTO-MCNC: 112 MG/DL (ref 65–117)
GLUCOSE BLD STRIP.AUTO-MCNC: 121 MG/DL (ref 65–117)
GLUCOSE BLD STRIP.AUTO-MCNC: 203 MG/DL (ref 65–117)
GLUCOSE BLD STRIP.AUTO-MCNC: 220 MG/DL (ref 65–117)
GLUCOSE SERPL-MCNC: 87 MG/DL (ref 65–100)
HCT VFR BLD AUTO: 37.9 % (ref 35–47)
HGB BLD-MCNC: 11.4 G/DL (ref 11.5–16)
IMM GRANULOCYTES # BLD AUTO: 0.04 K/UL (ref 0–0.04)
IMM GRANULOCYTES NFR BLD AUTO: 0.3 % (ref 0–0.5)
LYMPHOCYTES # BLD: 1.93 K/UL (ref 0.8–3.5)
LYMPHOCYTES NFR BLD: 16.6 % (ref 12–49)
MCH RBC QN AUTO: 27.8 PG (ref 26–34)
MCHC RBC AUTO-ENTMCNC: 30.1 G/DL (ref 30–36.5)
MCV RBC AUTO: 92.4 FL (ref 80–99)
MONOCYTES # BLD: 0.79 K/UL (ref 0–1)
MONOCYTES NFR BLD: 6.8 % (ref 5–13)
NEUTS SEG # BLD: 8.56 K/UL (ref 1.8–8)
NEUTS SEG NFR BLD: 73.6 % (ref 32–75)
NRBC # BLD: 0 K/UL (ref 0–0.01)
NRBC BLD-RTO: 0 PER 100 WBC
PLATELET # BLD AUTO: 176 K/UL (ref 150–400)
PMV BLD AUTO: 11.9 FL (ref 8.9–12.9)
POTASSIUM SERPL-SCNC: 4.6 MMOL/L (ref 3.5–5.1)
RBC # BLD AUTO: 4.1 M/UL (ref 3.8–5.2)
SERVICE CMNT-IMP: ABNORMAL
SERVICE CMNT-IMP: NORMAL
SERVICE CMNT-IMP: NORMAL
SODIUM SERPL-SCNC: 133 MMOL/L (ref 136–145)
WBC # BLD AUTO: 11.6 K/UL (ref 3.6–11)

## 2025-01-17 PROCEDURE — 6370000000 HC RX 637 (ALT 250 FOR IP): Performed by: INTERNAL MEDICINE

## 2025-01-17 PROCEDURE — 90935 HEMODIALYSIS ONE EVALUATION: CPT

## 2025-01-17 PROCEDURE — 6360000002 HC RX W HCPCS: Performed by: STUDENT IN AN ORGANIZED HEALTH CARE EDUCATION/TRAINING PROGRAM

## 2025-01-17 PROCEDURE — 2500000003 HC RX 250 WO HCPCS: Performed by: STUDENT IN AN ORGANIZED HEALTH CARE EDUCATION/TRAINING PROGRAM

## 2025-01-17 PROCEDURE — 94640 AIRWAY INHALATION TREATMENT: CPT

## 2025-01-17 PROCEDURE — 6370000000 HC RX 637 (ALT 250 FOR IP): Performed by: STUDENT IN AN ORGANIZED HEALTH CARE EDUCATION/TRAINING PROGRAM

## 2025-01-17 PROCEDURE — 82962 GLUCOSE BLOOD TEST: CPT

## 2025-01-17 PROCEDURE — 36415 COLL VENOUS BLD VENIPUNCTURE: CPT

## 2025-01-17 PROCEDURE — 85025 COMPLETE CBC W/AUTO DIFF WBC: CPT

## 2025-01-17 PROCEDURE — 94760 N-INVAS EAR/PLS OXIMETRY 1: CPT

## 2025-01-17 PROCEDURE — 80048 BASIC METABOLIC PNL TOTAL CA: CPT

## 2025-01-17 PROCEDURE — 5A1D70Z PERFORMANCE OF URINARY FILTRATION, INTERMITTENT, LESS THAN 6 HOURS PER DAY: ICD-10-PCS | Performed by: INTERNAL MEDICINE

## 2025-01-17 PROCEDURE — 1100000003 HC PRIVATE W/ TELEMETRY

## 2025-01-17 RX ADMIN — FERROUS SULFATE TAB 325 MG (65 MG ELEMENTAL FE) 325 MG: 325 (65 FE) TAB at 18:17

## 2025-01-17 RX ADMIN — ARFORMOTEROL TARTRATE: 15 SOLUTION RESPIRATORY (INHALATION) at 22:21

## 2025-01-17 RX ADMIN — QUETIAPINE FUMARATE 25 MG: 25 TABLET ORAL at 21:41

## 2025-01-17 RX ADMIN — HEPARIN SODIUM 5000 UNITS: 5000 INJECTION INTRAVENOUS; SUBCUTANEOUS at 06:13

## 2025-01-17 RX ADMIN — INSULIN LISPRO 2 UNITS: 100 INJECTION, SOLUTION INTRAVENOUS; SUBCUTANEOUS at 21:47

## 2025-01-17 RX ADMIN — QUETIAPINE FUMARATE 25 MG: 25 TABLET ORAL at 09:40

## 2025-01-17 RX ADMIN — ATORVASTATIN CALCIUM 40 MG: 40 TABLET, FILM COATED ORAL at 09:40

## 2025-01-17 RX ADMIN — WATER 1000 MG: 1 INJECTION INTRAMUSCULAR; INTRAVENOUS; SUBCUTANEOUS at 02:05

## 2025-01-17 RX ADMIN — HEPARIN SODIUM 5000 UNITS: 5000 INJECTION INTRAVENOUS; SUBCUTANEOUS at 13:34

## 2025-01-17 RX ADMIN — INSULIN GLARGINE 30 UNITS: 100 INJECTION, SOLUTION SUBCUTANEOUS at 09:43

## 2025-01-17 RX ADMIN — FERROUS SULFATE TAB 325 MG (65 MG ELEMENTAL FE) 325 MG: 325 (65 FE) TAB at 09:40

## 2025-01-17 RX ADMIN — FAMOTIDINE 10 MG: 20 TABLET, FILM COATED ORAL at 09:40

## 2025-01-17 RX ADMIN — ARFORMOTEROL TARTRATE: 15 SOLUTION RESPIRATORY (INHALATION) at 07:25

## 2025-01-17 RX ADMIN — QUETIAPINE FUMARATE 25 MG: 25 TABLET ORAL at 13:35

## 2025-01-17 RX ADMIN — SODIUM CHLORIDE, PRESERVATIVE FREE 8 ML: 5 INJECTION INTRAVENOUS at 22:45

## 2025-01-17 RX ADMIN — ASPIRIN 81 MG: 81 TABLET, COATED ORAL at 09:39

## 2025-01-17 RX ADMIN — MONTELUKAST 10 MG: 10 TABLET, FILM COATED ORAL at 09:39

## 2025-01-17 RX ADMIN — PAROXETINE HYDROCHLORIDE 30 MG: 20 TABLET, FILM COATED ORAL at 09:40

## 2025-01-17 RX ADMIN — SODIUM CHLORIDE, PRESERVATIVE FREE 10 ML: 5 INJECTION INTRAVENOUS at 09:48

## 2025-01-17 RX ADMIN — INSULIN LISPRO 2 UNITS: 100 INJECTION, SOLUTION INTRAVENOUS; SUBCUTANEOUS at 13:34

## 2025-01-17 RX ADMIN — BUMETANIDE 2 MG: 1 TABLET ORAL at 09:40

## 2025-01-17 RX ADMIN — HEPARIN SODIUM 5000 UNITS: 5000 INJECTION INTRAVENOUS; SUBCUTANEOUS at 21:41

## 2025-01-17 NOTE — CONSULTS
Requesting Provider: Derek Blakely MD - Reason for Consultation: \"renal masses r/o rcc\"  Pre-existing [facility] Patient:   No                Patient: Tiera Ramon MRN: [id number]  SSN: [id number]    YOB: 1955  Age: 69 y.o.  Sex: female     Location: [address] [place] [address]       Code Status: Full Code   PCP: None, None  - None   Emergency Contact:  Primary Emergency Contact: JUNIE LAUREN   Race/Yarsani/Language: Black /  / Other / Speaks English   Payor: Payor: [facility] MEDICARE / Plan: [employer] DUAL COMPLETE / Product Type: *No Product type* /    Prior Admission Data: 2/4/21 [facility] EMERGENCY DEPT     Hospitalized:  Hospital Day: 3 - Admitted 1/15/2025  7:42 PM   POD # * No surgery found *  by * Surgery not found * - Blood Loss: * No surgery found * * Surgery not found *     CONSULTANTS  IP CONSULT TO NEPHROLOGY  IP CONSULT TO PHARMACY  IP CONSULT TO UROLOGY   ADMISSION DIAGNOSES  [unfilled]      Assessment/Plan:       Renal Masses  ESRD on HD  CKD  UTI  Oliguric  - No urgent urological surgical intervention indicated at this time  - Antibiotics per primary team  - We will plan outpatient follow-up for further evaluation of renal masses and discussion of advanced imaging at that time.  - Our office will schedule follow-up; I stressed the importance of follow-up with the patient today.  - Urology signing off; available for questions or concerns.    D/w supervising Dr. Amarjit SNYDER       CC: [unfilled]   HPI: She is a 69 y.o. female who is new to our practice who we are consulted to see for the above.  She denies prior history of renal cancer or cancer.  Uncertain family history of renal cancer.  States that there have been multiple family members with renal lesions but uncertain if RCC.  She presented to the emergency department with chief complaint of right lower abdominal discomfort which was noted to be intermittent and crampy with shortness at the time.  She is having bowel

## 2025-01-17 NOTE — CONSULTS
Consultation Note    NAME: Tiera Ramon   :  1955   MRN:  [id number]     Date/Time:  2025 11:10 AM    Just perfect served with consult    I have been asked to see this patient by Dr. Blakely  for advice/opinion re: ESKD.     Assessment :    Plan:  ESKD  AVF  Mild hyponatremia  UTI  DM  Obesity class 3 (BMI > 40)  HTN  Anemia of eskd MWF HD at [facility] HD today    Urology consult to give recommendation re: CARSON/CT?    Hgb > 10, no josselyn for now    Will see again on Monday, but please call with questions or changes in the meantime.            Subjective:   CHIEF COMPLAINT:  eskd    HISTORY OF PRESENT ILLNESS:     Tiera is a 69 y.o.   female who has a history of the below. Sitting on the side of the bed. No current complaint. Admitted with a UTI. Chart reviewed. I spoke with kourtney mai.    Past Medical History:   Diagnosis Date    Asthma     COPD (chronic obstructive pulmonary disease) (HCC)     Diabetes (HCC)     Diastolic heart failure (HCC)     ESRD (end stage renal disease) on dialysis (HCC)     Hypertension     Pulmonary HTN (HCC)     Sleep apnea     no CPAP - waiting for it to be delivered      Past Surgical History:   Procedure Laterality Date    CHOLECYSTECTOMY      GYN          HEENT      tonsillectomy    HEENT      deviated septum repair     Social History     Tobacco Use    Smoking status: Never    Smokeless tobacco: Never   Substance Use Topics    Alcohol use: No      Family History   Problem Relation Age of Onset    Hypertension Mother     Heart Failure Mother       Allergies   Allergen Reactions    Codeine Hives    Lisinopril Hives      Prior to Admission medications    Medication Sig Start Date End Date Taking? Authorizing Provider   calcitRIOL (ROCALTROL) 0.25 MCG capsule Take 3 capsules by mouth Every Monday, Wednesday, and Friday At dialysis   Yes Provider, MD Adair   fluticasone-salmeterol (ADVAIR) 250-50 MCG/ACT AEPB diskus inhaler Inhale

## 2025-01-18 LAB
ANION GAP SERPL CALC-SCNC: 7 MMOL/L (ref 2–12)
BASOPHILS # BLD: 0.07 K/UL (ref 0–0.1)
BASOPHILS NFR BLD: 0.6 % (ref 0–1)
BUN SERPL-MCNC: 26 MG/DL (ref 6–20)
BUN/CREAT SERPL: 5 (ref 12–20)
CALCIUM SERPL-MCNC: 8.9 MG/DL (ref 8.5–10.1)
CHLORIDE SERPL-SCNC: 98 MMOL/L (ref 97–108)
CO2 SERPL-SCNC: 28 MMOL/L (ref 21–32)
CREAT SERPL-MCNC: 4.75 MG/DL (ref 0.55–1.02)
DIFFERENTIAL METHOD BLD: ABNORMAL
EOSINOPHIL # BLD: 0.2 K/UL (ref 0–0.4)
EOSINOPHIL NFR BLD: 1.8 % (ref 0–7)
ERYTHROCYTE [DISTWIDTH] IN BLOOD BY AUTOMATED COUNT: 15 % (ref 11.5–14.5)
GLUCOSE BLD STRIP.AUTO-MCNC: 243 MG/DL (ref 65–117)
GLUCOSE BLD STRIP.AUTO-MCNC: 250 MG/DL (ref 65–117)
GLUCOSE BLD STRIP.AUTO-MCNC: 260 MG/DL (ref 65–117)
GLUCOSE BLD STRIP.AUTO-MCNC: 262 MG/DL (ref 65–117)
GLUCOSE SERPL-MCNC: 214 MG/DL (ref 65–100)
HCT VFR BLD AUTO: 35.1 % (ref 35–47)
HGB BLD-MCNC: 10.4 G/DL (ref 11.5–16)
IMM GRANULOCYTES # BLD AUTO: 0.05 K/UL (ref 0–0.04)
IMM GRANULOCYTES NFR BLD AUTO: 0.5 % (ref 0–0.5)
LYMPHOCYTES # BLD: 2.16 K/UL (ref 0.8–3.5)
LYMPHOCYTES NFR BLD: 19.6 % (ref 12–49)
MCH RBC QN AUTO: 27.7 PG (ref 26–34)
MCHC RBC AUTO-ENTMCNC: 29.6 G/DL (ref 30–36.5)
MCV RBC AUTO: 93.6 FL (ref 80–99)
MONOCYTES # BLD: 0.8 K/UL (ref 0–1)
MONOCYTES NFR BLD: 7.2 % (ref 5–13)
NEUTS SEG # BLD: 7.76 K/UL (ref 1.8–8)
NEUTS SEG NFR BLD: 70.3 % (ref 32–75)
NRBC # BLD: 0 K/UL (ref 0–0.01)
NRBC BLD-RTO: 0 PER 100 WBC
PLATELET # BLD AUTO: 149 K/UL (ref 150–400)
PMV BLD AUTO: 11.2 FL (ref 8.9–12.9)
POTASSIUM SERPL-SCNC: 4.3 MMOL/L (ref 3.5–5.1)
RBC # BLD AUTO: 3.75 M/UL (ref 3.8–5.2)
SERVICE CMNT-IMP: ABNORMAL
SODIUM SERPL-SCNC: 133 MMOL/L (ref 136–145)
WBC # BLD AUTO: 11 K/UL (ref 3.6–11)

## 2025-01-18 PROCEDURE — 82962 GLUCOSE BLOOD TEST: CPT

## 2025-01-18 PROCEDURE — 94640 AIRWAY INHALATION TREATMENT: CPT

## 2025-01-18 PROCEDURE — 6360000002 HC RX W HCPCS: Performed by: INTERNAL MEDICINE

## 2025-01-18 PROCEDURE — 80048 BASIC METABOLIC PNL TOTAL CA: CPT

## 2025-01-18 PROCEDURE — 6360000002 HC RX W HCPCS: Performed by: STUDENT IN AN ORGANIZED HEALTH CARE EDUCATION/TRAINING PROGRAM

## 2025-01-18 PROCEDURE — 1100000003 HC PRIVATE W/ TELEMETRY

## 2025-01-18 PROCEDURE — 2500000003 HC RX 250 WO HCPCS: Performed by: INTERNAL MEDICINE

## 2025-01-18 PROCEDURE — 85025 COMPLETE CBC W/AUTO DIFF WBC: CPT

## 2025-01-18 PROCEDURE — 97116 GAIT TRAINING THERAPY: CPT

## 2025-01-18 PROCEDURE — 36415 COLL VENOUS BLD VENIPUNCTURE: CPT

## 2025-01-18 PROCEDURE — 2500000003 HC RX 250 WO HCPCS: Performed by: STUDENT IN AN ORGANIZED HEALTH CARE EDUCATION/TRAINING PROGRAM

## 2025-01-18 PROCEDURE — 6370000000 HC RX 637 (ALT 250 FOR IP): Performed by: INTERNAL MEDICINE

## 2025-01-18 PROCEDURE — 97166 OT EVAL MOD COMPLEX 45 MIN: CPT | Performed by: OCCUPATIONAL THERAPIST

## 2025-01-18 PROCEDURE — 97530 THERAPEUTIC ACTIVITIES: CPT | Performed by: OCCUPATIONAL THERAPIST

## 2025-01-18 PROCEDURE — 97110 THERAPEUTIC EXERCISES: CPT

## 2025-01-18 PROCEDURE — 97162 PT EVAL MOD COMPLEX 30 MIN: CPT

## 2025-01-18 PROCEDURE — 94761 N-INVAS EAR/PLS OXIMETRY MLT: CPT

## 2025-01-18 PROCEDURE — 6370000000 HC RX 637 (ALT 250 FOR IP): Performed by: STUDENT IN AN ORGANIZED HEALTH CARE EDUCATION/TRAINING PROGRAM

## 2025-01-18 RX ADMIN — QUETIAPINE FUMARATE 25 MG: 25 TABLET ORAL at 16:56

## 2025-01-18 RX ADMIN — ACETAMINOPHEN 650 MG: 325 TABLET ORAL at 20:56

## 2025-01-18 RX ADMIN — HEPARIN SODIUM 5000 UNITS: 5000 INJECTION INTRAVENOUS; SUBCUTANEOUS at 22:25

## 2025-01-18 RX ADMIN — QUETIAPINE FUMARATE 25 MG: 25 TABLET ORAL at 20:56

## 2025-01-18 RX ADMIN — INSULIN LISPRO 4 UNITS: 100 INJECTION, SOLUTION INTRAVENOUS; SUBCUTANEOUS at 12:44

## 2025-01-18 RX ADMIN — ARFORMOTEROL TARTRATE: 15 SOLUTION RESPIRATORY (INHALATION) at 21:05

## 2025-01-18 RX ADMIN — ATORVASTATIN CALCIUM 40 MG: 40 TABLET, FILM COATED ORAL at 09:01

## 2025-01-18 RX ADMIN — ASPIRIN 81 MG: 81 TABLET, COATED ORAL at 09:00

## 2025-01-18 RX ADMIN — ARFORMOTEROL TARTRATE: 15 SOLUTION RESPIRATORY (INHALATION) at 07:21

## 2025-01-18 RX ADMIN — WATER 1000 MG: 1 INJECTION INTRAMUSCULAR; INTRAVENOUS; SUBCUTANEOUS at 23:54

## 2025-01-18 RX ADMIN — ACETAMINOPHEN 650 MG: 325 TABLET ORAL at 00:46

## 2025-01-18 RX ADMIN — QUETIAPINE FUMARATE 25 MG: 25 TABLET ORAL at 09:01

## 2025-01-18 RX ADMIN — SODIUM CHLORIDE, PRESERVATIVE FREE 10 ML: 5 INJECTION INTRAVENOUS at 21:00

## 2025-01-18 RX ADMIN — BUMETANIDE 2 MG: 1 TABLET ORAL at 20:56

## 2025-01-18 RX ADMIN — MIDODRINE HYDROCHLORIDE 5 MG: 5 TABLET ORAL at 13:34

## 2025-01-18 RX ADMIN — FERROUS SULFATE TAB 325 MG (65 MG ELEMENTAL FE) 325 MG: 325 (65 FE) TAB at 09:01

## 2025-01-18 RX ADMIN — PAROXETINE HYDROCHLORIDE 30 MG: 20 TABLET, FILM COATED ORAL at 09:01

## 2025-01-18 RX ADMIN — SODIUM CHLORIDE, PRESERVATIVE FREE 10 ML: 5 INJECTION INTRAVENOUS at 09:02

## 2025-01-18 RX ADMIN — HEPARIN SODIUM 5000 UNITS: 5000 INJECTION INTRAVENOUS; SUBCUTANEOUS at 06:51

## 2025-01-18 RX ADMIN — HEPARIN SODIUM 5000 UNITS: 5000 INJECTION INTRAVENOUS; SUBCUTANEOUS at 16:55

## 2025-01-18 RX ADMIN — INSULIN LISPRO 2 UNITS: 100 INJECTION, SOLUTION INTRAVENOUS; SUBCUTANEOUS at 18:46

## 2025-01-18 RX ADMIN — INSULIN GLARGINE 30 UNITS: 100 INJECTION, SOLUTION SUBCUTANEOUS at 09:00

## 2025-01-18 RX ADMIN — MONTELUKAST 10 MG: 10 TABLET, FILM COATED ORAL at 09:02

## 2025-01-18 RX ADMIN — WATER 1000 MG: 1 INJECTION INTRAMUSCULAR; INTRAVENOUS; SUBCUTANEOUS at 00:06

## 2025-01-18 RX ADMIN — FAMOTIDINE 10 MG: 20 TABLET, FILM COATED ORAL at 09:01

## 2025-01-18 RX ADMIN — INSULIN LISPRO 4 UNITS: 100 INJECTION, SOLUTION INTRAVENOUS; SUBCUTANEOUS at 22:25

## 2025-01-18 RX ADMIN — INSULIN LISPRO 4 UNITS: 100 INJECTION, SOLUTION INTRAVENOUS; SUBCUTANEOUS at 06:50

## 2025-01-18 RX ADMIN — BUMETANIDE 2 MG: 1 TABLET ORAL at 09:01

## 2025-01-18 RX ADMIN — FERROUS SULFATE TAB 325 MG (65 MG ELEMENTAL FE) 325 MG: 325 (65 FE) TAB at 16:56

## 2025-01-18 ASSESSMENT — PAIN SCALES - GENERAL
PAINLEVEL_OUTOF10: 5
PAINLEVEL_OUTOF10: 2
PAINLEVEL_OUTOF10: 3
PAINLEVEL_OUTOF10: 0
PAINLEVEL_OUTOF10: 1
PAINLEVEL_OUTOF10: 1

## 2025-01-18 ASSESSMENT — PAIN DESCRIPTION - ORIENTATION
ORIENTATION: RIGHT;LOWER
ORIENTATION: RIGHT

## 2025-01-18 ASSESSMENT — PAIN DESCRIPTION - DESCRIPTORS: DESCRIPTORS: ACHING

## 2025-01-18 ASSESSMENT — PAIN DESCRIPTION - LOCATION
LOCATION: KNEE
LOCATION: ABDOMEN;BACK

## 2025-01-18 ASSESSMENT — PAIN SCALES - WONG BAKER
WONGBAKER_NUMERICALRESPONSE: HURTS A LITTLE BIT
WONGBAKER_NUMERICALRESPONSE: NO HURT

## 2025-01-19 LAB
ANION GAP SERPL CALC-SCNC: 9 MMOL/L (ref 2–12)
BASOPHILS # BLD: 0.07 K/UL (ref 0–0.1)
BASOPHILS NFR BLD: 0.6 % (ref 0–1)
BUN SERPL-MCNC: 42 MG/DL (ref 6–20)
BUN/CREAT SERPL: 7 (ref 12–20)
CALCIUM SERPL-MCNC: 8.9 MG/DL (ref 8.5–10.1)
CHLORIDE SERPL-SCNC: 95 MMOL/L (ref 97–108)
CO2 SERPL-SCNC: 27 MMOL/L (ref 21–32)
CREAT SERPL-MCNC: 6.42 MG/DL (ref 0.55–1.02)
DIFFERENTIAL METHOD BLD: ABNORMAL
EOSINOPHIL # BLD: 0.26 K/UL (ref 0–0.4)
EOSINOPHIL NFR BLD: 2.3 % (ref 0–7)
ERYTHROCYTE [DISTWIDTH] IN BLOOD BY AUTOMATED COUNT: 14.6 % (ref 11.5–14.5)
GLUCOSE BLD STRIP.AUTO-MCNC: 116 MG/DL (ref 65–117)
GLUCOSE BLD STRIP.AUTO-MCNC: 124 MG/DL (ref 65–117)
GLUCOSE BLD STRIP.AUTO-MCNC: 170 MG/DL (ref 65–117)
GLUCOSE BLD STRIP.AUTO-MCNC: 187 MG/DL (ref 65–117)
GLUCOSE BLD STRIP.AUTO-MCNC: 200 MG/DL (ref 65–117)
GLUCOSE SERPL-MCNC: 102 MG/DL (ref 65–100)
HCT VFR BLD AUTO: 35.2 % (ref 35–47)
HGB BLD-MCNC: 10.9 G/DL (ref 11.5–16)
IMM GRANULOCYTES # BLD AUTO: 0.04 K/UL (ref 0–0.04)
IMM GRANULOCYTES NFR BLD AUTO: 0.3 % (ref 0–0.5)
LYMPHOCYTES # BLD: 2.15 K/UL (ref 0.8–3.5)
LYMPHOCYTES NFR BLD: 18.7 % (ref 12–49)
MCH RBC QN AUTO: 27.9 PG (ref 26–34)
MCHC RBC AUTO-ENTMCNC: 31 G/DL (ref 30–36.5)
MCV RBC AUTO: 90.3 FL (ref 80–99)
MONOCYTES # BLD: 0.85 K/UL (ref 0–1)
MONOCYTES NFR BLD: 7.4 % (ref 5–13)
NEUTS SEG # BLD: 8.14 K/UL (ref 1.8–8)
NEUTS SEG NFR BLD: 70.7 % (ref 32–75)
NRBC # BLD: 0 K/UL (ref 0–0.01)
NRBC BLD-RTO: 0 PER 100 WBC
PLATELET # BLD AUTO: 162 K/UL (ref 150–400)
PMV BLD AUTO: 11.6 FL (ref 8.9–12.9)
POTASSIUM SERPL-SCNC: 4.6 MMOL/L (ref 3.5–5.1)
RBC # BLD AUTO: 3.9 M/UL (ref 3.8–5.2)
SERVICE CMNT-IMP: ABNORMAL
SERVICE CMNT-IMP: NORMAL
SODIUM SERPL-SCNC: 131 MMOL/L (ref 136–145)
WBC # BLD AUTO: 11.5 K/UL (ref 3.6–11)

## 2025-01-19 PROCEDURE — 6370000000 HC RX 637 (ALT 250 FOR IP): Performed by: INTERNAL MEDICINE

## 2025-01-19 PROCEDURE — 1100000003 HC PRIVATE W/ TELEMETRY

## 2025-01-19 PROCEDURE — 2500000003 HC RX 250 WO HCPCS: Performed by: STUDENT IN AN ORGANIZED HEALTH CARE EDUCATION/TRAINING PROGRAM

## 2025-01-19 PROCEDURE — 80048 BASIC METABOLIC PNL TOTAL CA: CPT

## 2025-01-19 PROCEDURE — 94761 N-INVAS EAR/PLS OXIMETRY MLT: CPT

## 2025-01-19 PROCEDURE — 94640 AIRWAY INHALATION TREATMENT: CPT

## 2025-01-19 PROCEDURE — 82962 GLUCOSE BLOOD TEST: CPT

## 2025-01-19 PROCEDURE — 6360000002 HC RX W HCPCS: Performed by: STUDENT IN AN ORGANIZED HEALTH CARE EDUCATION/TRAINING PROGRAM

## 2025-01-19 PROCEDURE — 6370000000 HC RX 637 (ALT 250 FOR IP): Performed by: STUDENT IN AN ORGANIZED HEALTH CARE EDUCATION/TRAINING PROGRAM

## 2025-01-19 PROCEDURE — 36415 COLL VENOUS BLD VENIPUNCTURE: CPT

## 2025-01-19 PROCEDURE — 85025 COMPLETE CBC W/AUTO DIFF WBC: CPT

## 2025-01-19 RX ORDER — MIDODRINE HYDROCHLORIDE 5 MG/1
5 TABLET ORAL 3 TIMES DAILY
Status: DISCONTINUED | OUTPATIENT
Start: 2025-01-19 | End: 2025-01-29 | Stop reason: HOSPADM

## 2025-01-19 RX ADMIN — QUETIAPINE FUMARATE 25 MG: 25 TABLET ORAL at 09:30

## 2025-01-19 RX ADMIN — QUETIAPINE FUMARATE 25 MG: 25 TABLET ORAL at 20:42

## 2025-01-19 RX ADMIN — INSULIN LISPRO 2 UNITS: 100 INJECTION, SOLUTION INTRAVENOUS; SUBCUTANEOUS at 11:54

## 2025-01-19 RX ADMIN — ARFORMOTEROL TARTRATE: 15 SOLUTION RESPIRATORY (INHALATION) at 21:17

## 2025-01-19 RX ADMIN — MONTELUKAST 10 MG: 10 TABLET, FILM COATED ORAL at 09:29

## 2025-01-19 RX ADMIN — ATORVASTATIN CALCIUM 40 MG: 40 TABLET, FILM COATED ORAL at 09:29

## 2025-01-19 RX ADMIN — FERROUS SULFATE TAB 325 MG (65 MG ELEMENTAL FE) 325 MG: 325 (65 FE) TAB at 16:37

## 2025-01-19 RX ADMIN — ASPIRIN 81 MG: 81 TABLET, COATED ORAL at 09:29

## 2025-01-19 RX ADMIN — HEPARIN SODIUM 5000 UNITS: 5000 INJECTION INTRAVENOUS; SUBCUTANEOUS at 20:43

## 2025-01-19 RX ADMIN — SODIUM CHLORIDE, PRESERVATIVE FREE 10 ML: 5 INJECTION INTRAVENOUS at 20:43

## 2025-01-19 RX ADMIN — ACETAMINOPHEN 650 MG: 325 TABLET ORAL at 10:10

## 2025-01-19 RX ADMIN — HEPARIN SODIUM 5000 UNITS: 5000 INJECTION INTRAVENOUS; SUBCUTANEOUS at 06:09

## 2025-01-19 RX ADMIN — BUMETANIDE 2 MG: 1 TABLET ORAL at 09:29

## 2025-01-19 RX ADMIN — FAMOTIDINE 10 MG: 20 TABLET, FILM COATED ORAL at 09:29

## 2025-01-19 RX ADMIN — QUETIAPINE FUMARATE 25 MG: 25 TABLET ORAL at 13:34

## 2025-01-19 RX ADMIN — INSULIN GLARGINE 30 UNITS: 100 INJECTION, SOLUTION SUBCUTANEOUS at 08:52

## 2025-01-19 RX ADMIN — INSULIN LISPRO 2 UNITS: 100 INJECTION, SOLUTION INTRAVENOUS; SUBCUTANEOUS at 17:59

## 2025-01-19 RX ADMIN — FERROUS SULFATE TAB 325 MG (65 MG ELEMENTAL FE) 325 MG: 325 (65 FE) TAB at 09:30

## 2025-01-19 RX ADMIN — POLYETHYLENE GLYCOL 3350 17 G: 17 POWDER, FOR SOLUTION ORAL at 06:16

## 2025-01-19 RX ADMIN — ARFORMOTEROL TARTRATE: 15 SOLUTION RESPIRATORY (INHALATION) at 07:15

## 2025-01-19 RX ADMIN — MIDODRINE HYDROCHLORIDE 5 MG: 5 TABLET ORAL at 13:34

## 2025-01-19 RX ADMIN — BUMETANIDE 2 MG: 1 TABLET ORAL at 20:42

## 2025-01-19 RX ADMIN — PAROXETINE HYDROCHLORIDE 30 MG: 20 TABLET, FILM COATED ORAL at 09:29

## 2025-01-19 RX ADMIN — HEPARIN SODIUM 5000 UNITS: 5000 INJECTION INTRAVENOUS; SUBCUTANEOUS at 13:34

## 2025-01-19 ASSESSMENT — PAIN SCALES - WONG BAKER
WONGBAKER_NUMERICALRESPONSE: NO HURT
WONGBAKER_NUMERICALRESPONSE: NO HURT

## 2025-01-19 ASSESSMENT — PAIN SCALES - GENERAL
PAINLEVEL_OUTOF10: 0
PAINLEVEL_OUTOF10: 7

## 2025-01-20 LAB
ANION GAP SERPL CALC-SCNC: 11 MMOL/L (ref 2–12)
BASOPHILS # BLD: 0.06 K/UL (ref 0–0.1)
BASOPHILS NFR BLD: 0.5 % (ref 0–1)
BUN SERPL-MCNC: 55 MG/DL (ref 6–20)
BUN/CREAT SERPL: 7 (ref 12–20)
CALCIUM SERPL-MCNC: 8.8 MG/DL (ref 8.5–10.1)
CHLORIDE SERPL-SCNC: 96 MMOL/L (ref 97–108)
CO2 SERPL-SCNC: 26 MMOL/L (ref 21–32)
CREAT SERPL-MCNC: 7.66 MG/DL (ref 0.55–1.02)
DIFFERENTIAL METHOD BLD: ABNORMAL
EOSINOPHIL # BLD: 0.23 K/UL (ref 0–0.4)
EOSINOPHIL NFR BLD: 1.9 % (ref 0–7)
ERYTHROCYTE [DISTWIDTH] IN BLOOD BY AUTOMATED COUNT: 14.8 % (ref 11.5–14.5)
GLUCOSE BLD STRIP.AUTO-MCNC: 154 MG/DL (ref 65–117)
GLUCOSE BLD STRIP.AUTO-MCNC: 210 MG/DL (ref 65–117)
GLUCOSE BLD STRIP.AUTO-MCNC: 224 MG/DL (ref 65–117)
GLUCOSE SERPL-MCNC: 115 MG/DL (ref 65–100)
HCT VFR BLD AUTO: 33.6 % (ref 35–47)
HGB BLD-MCNC: 10.4 G/DL (ref 11.5–16)
IMM GRANULOCYTES # BLD AUTO: 0.06 K/UL (ref 0–0.04)
IMM GRANULOCYTES NFR BLD AUTO: 0.5 % (ref 0–0.5)
LYMPHOCYTES # BLD: 1.81 K/UL (ref 0.8–3.5)
LYMPHOCYTES NFR BLD: 14.7 % (ref 12–49)
MCH RBC QN AUTO: 28 PG (ref 26–34)
MCHC RBC AUTO-ENTMCNC: 31 G/DL (ref 30–36.5)
MCV RBC AUTO: 90.3 FL (ref 80–99)
MONOCYTES # BLD: 0.82 K/UL (ref 0–1)
MONOCYTES NFR BLD: 6.7 % (ref 5–13)
NEUTS SEG # BLD: 9.34 K/UL (ref 1.8–8)
NEUTS SEG NFR BLD: 75.7 % (ref 32–75)
NRBC # BLD: 0 K/UL (ref 0–0.01)
NRBC BLD-RTO: 0 PER 100 WBC
PLATELET # BLD AUTO: 175 K/UL (ref 150–400)
PMV BLD AUTO: 11.9 FL (ref 8.9–12.9)
POTASSIUM SERPL-SCNC: 4.8 MMOL/L (ref 3.5–5.1)
RBC # BLD AUTO: 3.72 M/UL (ref 3.8–5.2)
SERVICE CMNT-IMP: ABNORMAL
SODIUM SERPL-SCNC: 133 MMOL/L (ref 136–145)
WBC # BLD AUTO: 12.3 K/UL (ref 3.6–11)

## 2025-01-20 PROCEDURE — 85025 COMPLETE CBC W/AUTO DIFF WBC: CPT

## 2025-01-20 PROCEDURE — 6370000000 HC RX 637 (ALT 250 FOR IP): Performed by: INTERNAL MEDICINE

## 2025-01-20 PROCEDURE — 1100000003 HC PRIVATE W/ TELEMETRY

## 2025-01-20 PROCEDURE — 6360000002 HC RX W HCPCS: Performed by: STUDENT IN AN ORGANIZED HEALTH CARE EDUCATION/TRAINING PROGRAM

## 2025-01-20 PROCEDURE — 94761 N-INVAS EAR/PLS OXIMETRY MLT: CPT

## 2025-01-20 PROCEDURE — 90935 HEMODIALYSIS ONE EVALUATION: CPT

## 2025-01-20 PROCEDURE — 82962 GLUCOSE BLOOD TEST: CPT

## 2025-01-20 PROCEDURE — 2500000003 HC RX 250 WO HCPCS: Performed by: INTERNAL MEDICINE

## 2025-01-20 PROCEDURE — 6360000002 HC RX W HCPCS: Performed by: INTERNAL MEDICINE

## 2025-01-20 PROCEDURE — 36415 COLL VENOUS BLD VENIPUNCTURE: CPT

## 2025-01-20 PROCEDURE — 6370000000 HC RX 637 (ALT 250 FOR IP): Performed by: STUDENT IN AN ORGANIZED HEALTH CARE EDUCATION/TRAINING PROGRAM

## 2025-01-20 PROCEDURE — 2500000003 HC RX 250 WO HCPCS: Performed by: STUDENT IN AN ORGANIZED HEALTH CARE EDUCATION/TRAINING PROGRAM

## 2025-01-20 PROCEDURE — 80048 BASIC METABOLIC PNL TOTAL CA: CPT

## 2025-01-20 PROCEDURE — 94640 AIRWAY INHALATION TREATMENT: CPT

## 2025-01-20 RX ADMIN — SODIUM CHLORIDE, PRESERVATIVE FREE 10 ML: 5 INJECTION INTRAVENOUS at 21:01

## 2025-01-20 RX ADMIN — PAROXETINE HYDROCHLORIDE 30 MG: 20 TABLET, FILM COATED ORAL at 14:27

## 2025-01-20 RX ADMIN — INSULIN GLARGINE 30 UNITS: 100 INJECTION, SOLUTION SUBCUTANEOUS at 14:29

## 2025-01-20 RX ADMIN — ARFORMOTEROL TARTRATE: 15 SOLUTION RESPIRATORY (INHALATION) at 21:11

## 2025-01-20 RX ADMIN — FAMOTIDINE 10 MG: 20 TABLET, FILM COATED ORAL at 14:27

## 2025-01-20 RX ADMIN — BUMETANIDE 2 MG: 1 TABLET ORAL at 14:27

## 2025-01-20 RX ADMIN — ARFORMOTEROL TARTRATE: 15 SOLUTION RESPIRATORY (INHALATION) at 08:35

## 2025-01-20 RX ADMIN — MIDODRINE HYDROCHLORIDE 5 MG: 5 TABLET ORAL at 21:00

## 2025-01-20 RX ADMIN — FERROUS SULFATE TAB 325 MG (65 MG ELEMENTAL FE) 325 MG: 325 (65 FE) TAB at 14:27

## 2025-01-20 RX ADMIN — QUETIAPINE FUMARATE 25 MG: 25 TABLET ORAL at 14:27

## 2025-01-20 RX ADMIN — Medication 3 MG: at 00:03

## 2025-01-20 RX ADMIN — HEPARIN SODIUM 5000 UNITS: 5000 INJECTION INTRAVENOUS; SUBCUTANEOUS at 14:29

## 2025-01-20 RX ADMIN — BUMETANIDE 2 MG: 1 TABLET ORAL at 21:00

## 2025-01-20 RX ADMIN — WATER 1000 MG: 1 INJECTION INTRAMUSCULAR; INTRAVENOUS; SUBCUTANEOUS at 23:15

## 2025-01-20 RX ADMIN — INSULIN LISPRO 2 UNITS: 100 INJECTION, SOLUTION INTRAVENOUS; SUBCUTANEOUS at 22:00

## 2025-01-20 RX ADMIN — MIDODRINE HYDROCHLORIDE 5 MG: 5 TABLET ORAL at 00:03

## 2025-01-20 RX ADMIN — FERROUS SULFATE TAB 325 MG (65 MG ELEMENTAL FE) 325 MG: 325 (65 FE) TAB at 18:12

## 2025-01-20 RX ADMIN — SODIUM CHLORIDE, PRESERVATIVE FREE 10 ML: 5 INJECTION INTRAVENOUS at 14:28

## 2025-01-20 RX ADMIN — HEPARIN SODIUM 5000 UNITS: 5000 INJECTION INTRAVENOUS; SUBCUTANEOUS at 21:00

## 2025-01-20 RX ADMIN — MONTELUKAST 10 MG: 10 TABLET, FILM COATED ORAL at 14:27

## 2025-01-20 RX ADMIN — ATORVASTATIN CALCIUM 40 MG: 40 TABLET, FILM COATED ORAL at 14:27

## 2025-01-20 RX ADMIN — INSULIN LISPRO 2 UNITS: 100 INJECTION, SOLUTION INTRAVENOUS; SUBCUTANEOUS at 18:12

## 2025-01-20 RX ADMIN — MIDODRINE HYDROCHLORIDE 5 MG: 5 TABLET ORAL at 08:58

## 2025-01-20 RX ADMIN — ASPIRIN 81 MG: 81 TABLET, COATED ORAL at 14:27

## 2025-01-20 RX ADMIN — HEPARIN SODIUM 5000 UNITS: 5000 INJECTION INTRAVENOUS; SUBCUTANEOUS at 05:32

## 2025-01-20 RX ADMIN — QUETIAPINE FUMARATE 25 MG: 25 TABLET ORAL at 21:00

## 2025-01-20 RX ADMIN — WATER 1000 MG: 1 INJECTION INTRAMUSCULAR; INTRAVENOUS; SUBCUTANEOUS at 00:03

## 2025-01-20 RX ADMIN — MIDODRINE HYDROCHLORIDE 5 MG: 5 TABLET ORAL at 14:28

## 2025-01-20 ASSESSMENT — PAIN SCALES - GENERAL
PAINLEVEL_OUTOF10: 0

## 2025-01-20 NOTE — CARE COORDINATION
CM attempted to see patient for CM assessment x2 - patient off floor for HD - attempted to call Madiha - daughter on file and unable to reach and VM is full. SHAW HAM, MSW  [phone]

## 2025-01-20 NOTE — DIALYSIS
Hep B verification performed by (RN): CASSIE Holman RN  Hep B results, dates, and Primary Source: Neg/Imm 5/17/24, Care Everywhere  No results found for: \"HEPBSAG\", \"HBSAGI\", \"HEPBSAB\", \"HBSABI\"  Machine disinfect process: Acid/Heat

## 2025-01-20 NOTE — CARE COORDINATION
Care Management Initial Assessment       RUR:17%  Readmission? No  1st IM letter given? Yes 1/16/2024  1st  letter given: No    CM met with patient - lives alone in an apartment with elevator to 4th floor- has a rollator and wheelchair - goes to OP HD at [facility] at 11 am - uses Medicaid transport - has a standing order with [facility] van for transport - did have a consumer directed aide through [employer] and is in process of getting an new aide and she plans to call them to get that reinstated- has been at a SNF in [place] but does not remember the name and does not want to go there- is agreeable to going to SNF - CM sent perfect serve to attending MD and awaiting response to ensure this is an agreeable plan for patient - patient provided with SNF listing - would like to try [facility] and [facility]. Will need authorization from insurance as well.RUBEN CRUZ      Advance Care Planning     General Advance Care Planning (ACP) Conversation    Date of Conversation: 1/20/2025  Conducted with: Patient with Decision Making Capacity  Other persons present: None    Healthcare Decision Maker: daughter Madiha Alvarez [phone] per patient report is primary - patient states daughter has copy of AMD- encouraged patient to have a copy brought to put on file  Content/Action Overview:  Has ACP document(s) NOT on file - requested patient to provide  Reviewed DNR/DNI and patient elects Full Code (Attempt Resuscitation)      Length of Voluntary ACP Conversation in minutes:  <16 minutes (Non-Billable)    RUBEN CRUZ  [phone]            01/20/25 1641   Service Assessment   Patient Orientation Alert and Oriented   Cognition Alert   History Provided By Patient   Primary Caregiver Self   Support Systems Children   Patient's Healthcare Decision Maker is: Legal Next of Kin  (patient encouraged to have a copy brought in to put on file- Madiha Daughter is listed primary and Jb

## 2025-01-21 LAB
GLUCOSE BLD STRIP.AUTO-MCNC: 107 MG/DL (ref 65–117)
GLUCOSE BLD STRIP.AUTO-MCNC: 175 MG/DL (ref 65–117)
GLUCOSE BLD STRIP.AUTO-MCNC: 218 MG/DL (ref 65–117)
GLUCOSE BLD STRIP.AUTO-MCNC: 294 MG/DL (ref 65–117)
SERVICE CMNT-IMP: ABNORMAL
SERVICE CMNT-IMP: NORMAL

## 2025-01-21 PROCEDURE — 2500000003 HC RX 250 WO HCPCS: Performed by: STUDENT IN AN ORGANIZED HEALTH CARE EDUCATION/TRAINING PROGRAM

## 2025-01-21 PROCEDURE — 97116 GAIT TRAINING THERAPY: CPT | Performed by: PHYSICAL THERAPIST

## 2025-01-21 PROCEDURE — 97530 THERAPEUTIC ACTIVITIES: CPT

## 2025-01-21 PROCEDURE — 6360000002 HC RX W HCPCS: Performed by: STUDENT IN AN ORGANIZED HEALTH CARE EDUCATION/TRAINING PROGRAM

## 2025-01-21 PROCEDURE — 1100000003 HC PRIVATE W/ TELEMETRY

## 2025-01-21 PROCEDURE — 97535 SELF CARE MNGMENT TRAINING: CPT

## 2025-01-21 PROCEDURE — 6370000000 HC RX 637 (ALT 250 FOR IP): Performed by: INTERNAL MEDICINE

## 2025-01-21 PROCEDURE — 6370000000 HC RX 637 (ALT 250 FOR IP): Performed by: STUDENT IN AN ORGANIZED HEALTH CARE EDUCATION/TRAINING PROGRAM

## 2025-01-21 PROCEDURE — 94640 AIRWAY INHALATION TREATMENT: CPT

## 2025-01-21 PROCEDURE — 97530 THERAPEUTIC ACTIVITIES: CPT | Performed by: PHYSICAL THERAPIST

## 2025-01-21 PROCEDURE — 82962 GLUCOSE BLOOD TEST: CPT

## 2025-01-21 RX ADMIN — HEPARIN SODIUM 5000 UNITS: 5000 INJECTION INTRAVENOUS; SUBCUTANEOUS at 14:46

## 2025-01-21 RX ADMIN — WATER 1000 MG: 1 INJECTION INTRAMUSCULAR; INTRAVENOUS; SUBCUTANEOUS at 23:41

## 2025-01-21 RX ADMIN — MIDODRINE HYDROCHLORIDE 5 MG: 5 TABLET ORAL at 09:54

## 2025-01-21 RX ADMIN — HEPARIN SODIUM 5000 UNITS: 5000 INJECTION INTRAVENOUS; SUBCUTANEOUS at 05:23

## 2025-01-21 RX ADMIN — QUETIAPINE FUMARATE 25 MG: 25 TABLET ORAL at 21:44

## 2025-01-21 RX ADMIN — ATORVASTATIN CALCIUM 40 MG: 40 TABLET, FILM COATED ORAL at 09:54

## 2025-01-21 RX ADMIN — HEPARIN SODIUM 5000 UNITS: 5000 INJECTION INTRAVENOUS; SUBCUTANEOUS at 21:43

## 2025-01-21 RX ADMIN — MONTELUKAST 10 MG: 10 TABLET, FILM COATED ORAL at 09:54

## 2025-01-21 RX ADMIN — MIDODRINE HYDROCHLORIDE 5 MG: 5 TABLET ORAL at 14:46

## 2025-01-21 RX ADMIN — ARFORMOTEROL TARTRATE: 15 SOLUTION RESPIRATORY (INHALATION) at 07:45

## 2025-01-21 RX ADMIN — SODIUM CHLORIDE, PRESERVATIVE FREE 10 ML: 5 INJECTION INTRAVENOUS at 10:00

## 2025-01-21 RX ADMIN — FAMOTIDINE 10 MG: 20 TABLET, FILM COATED ORAL at 09:56

## 2025-01-21 RX ADMIN — INSULIN LISPRO 2 UNITS: 100 INJECTION, SOLUTION INTRAVENOUS; SUBCUTANEOUS at 21:43

## 2025-01-21 RX ADMIN — FERROUS SULFATE TAB 325 MG (65 MG ELEMENTAL FE) 325 MG: 325 (65 FE) TAB at 18:05

## 2025-01-21 RX ADMIN — QUETIAPINE FUMARATE 25 MG: 25 TABLET ORAL at 14:46

## 2025-01-21 RX ADMIN — ARFORMOTEROL TARTRATE: 15 SOLUTION RESPIRATORY (INHALATION) at 19:33

## 2025-01-21 RX ADMIN — ASPIRIN 81 MG: 81 TABLET, COATED ORAL at 09:54

## 2025-01-21 RX ADMIN — BUMETANIDE 2 MG: 1 TABLET ORAL at 21:44

## 2025-01-21 RX ADMIN — SODIUM CHLORIDE, PRESERVATIVE FREE 10 ML: 5 INJECTION INTRAVENOUS at 21:44

## 2025-01-21 RX ADMIN — QUETIAPINE FUMARATE 25 MG: 25 TABLET ORAL at 09:54

## 2025-01-21 RX ADMIN — MIDODRINE HYDROCHLORIDE 5 MG: 5 TABLET ORAL at 21:44

## 2025-01-21 RX ADMIN — PAROXETINE HYDROCHLORIDE 30 MG: 20 TABLET, FILM COATED ORAL at 09:57

## 2025-01-21 RX ADMIN — BUMETANIDE 2 MG: 1 TABLET ORAL at 09:54

## 2025-01-21 RX ADMIN — FERROUS SULFATE TAB 325 MG (65 MG ELEMENTAL FE) 325 MG: 325 (65 FE) TAB at 09:54

## 2025-01-21 RX ADMIN — INSULIN LISPRO 4 UNITS: 100 INJECTION, SOLUTION INTRAVENOUS; SUBCUTANEOUS at 18:05

## 2025-01-21 RX ADMIN — INSULIN GLARGINE 30 UNITS: 100 INJECTION, SOLUTION SUBCUTANEOUS at 10:00

## 2025-01-21 ASSESSMENT — PAIN SCALES - WONG BAKER: WONGBAKER_NUMERICALRESPONSE: NO HURT

## 2025-01-21 ASSESSMENT — PAIN SCALES - GENERAL
PAINLEVEL_OUTOF10: 0

## 2025-01-22 LAB
GLUCOSE BLD STRIP.AUTO-MCNC: 115 MG/DL (ref 65–117)
GLUCOSE BLD STRIP.AUTO-MCNC: 119 MG/DL (ref 65–117)
GLUCOSE BLD STRIP.AUTO-MCNC: 162 MG/DL (ref 65–117)
GLUCOSE BLD STRIP.AUTO-MCNC: 266 MG/DL (ref 65–117)
SERVICE CMNT-IMP: ABNORMAL
SERVICE CMNT-IMP: NORMAL

## 2025-01-22 PROCEDURE — 6360000002 HC RX W HCPCS: Performed by: STUDENT IN AN ORGANIZED HEALTH CARE EDUCATION/TRAINING PROGRAM

## 2025-01-22 PROCEDURE — 6370000000 HC RX 637 (ALT 250 FOR IP): Performed by: STUDENT IN AN ORGANIZED HEALTH CARE EDUCATION/TRAINING PROGRAM

## 2025-01-22 PROCEDURE — 6370000000 HC RX 637 (ALT 250 FOR IP): Performed by: INTERNAL MEDICINE

## 2025-01-22 PROCEDURE — 1100000003 HC PRIVATE W/ TELEMETRY

## 2025-01-22 PROCEDURE — 2500000003 HC RX 250 WO HCPCS: Performed by: STUDENT IN AN ORGANIZED HEALTH CARE EDUCATION/TRAINING PROGRAM

## 2025-01-22 PROCEDURE — 82962 GLUCOSE BLOOD TEST: CPT

## 2025-01-22 PROCEDURE — 94761 N-INVAS EAR/PLS OXIMETRY MLT: CPT

## 2025-01-22 PROCEDURE — 94640 AIRWAY INHALATION TREATMENT: CPT

## 2025-01-22 PROCEDURE — 90935 HEMODIALYSIS ONE EVALUATION: CPT

## 2025-01-22 RX ORDER — BUMETANIDE 1 MG/1
1 TABLET ORAL 2 TIMES DAILY
Status: DISCONTINUED | OUTPATIENT
Start: 2025-01-22 | End: 2025-01-29 | Stop reason: HOSPADM

## 2025-01-22 RX ADMIN — ASPIRIN 81 MG: 81 TABLET, COATED ORAL at 14:20

## 2025-01-22 RX ADMIN — WATER 1000 MG: 1 INJECTION INTRAMUSCULAR; INTRAVENOUS; SUBCUTANEOUS at 23:58

## 2025-01-22 RX ADMIN — INSULIN LISPRO 4 UNITS: 100 INJECTION, SOLUTION INTRAVENOUS; SUBCUTANEOUS at 17:37

## 2025-01-22 RX ADMIN — ACETAMINOPHEN 650 MG: 325 TABLET ORAL at 23:58

## 2025-01-22 RX ADMIN — MIDODRINE HYDROCHLORIDE 5 MG: 5 TABLET ORAL at 14:31

## 2025-01-22 RX ADMIN — HEPARIN SODIUM 5000 UNITS: 5000 INJECTION INTRAVENOUS; SUBCUTANEOUS at 21:08

## 2025-01-22 RX ADMIN — ARFORMOTEROL TARTRATE: 15 SOLUTION RESPIRATORY (INHALATION) at 08:17

## 2025-01-22 RX ADMIN — HEPARIN SODIUM 5000 UNITS: 5000 INJECTION INTRAVENOUS; SUBCUTANEOUS at 14:31

## 2025-01-22 RX ADMIN — ARFORMOTEROL TARTRATE: 15 SOLUTION RESPIRATORY (INHALATION) at 21:30

## 2025-01-22 RX ADMIN — SODIUM CHLORIDE, PRESERVATIVE FREE 10 ML: 5 INJECTION INTRAVENOUS at 21:08

## 2025-01-22 RX ADMIN — FAMOTIDINE 10 MG: 20 TABLET, FILM COATED ORAL at 14:20

## 2025-01-22 RX ADMIN — QUETIAPINE FUMARATE 25 MG: 25 TABLET ORAL at 14:20

## 2025-01-22 RX ADMIN — QUETIAPINE FUMARATE 25 MG: 25 TABLET ORAL at 21:07

## 2025-01-22 RX ADMIN — ATORVASTATIN CALCIUM 40 MG: 40 TABLET, FILM COATED ORAL at 14:19

## 2025-01-22 RX ADMIN — HEPARIN SODIUM 5000 UNITS: 5000 INJECTION INTRAVENOUS; SUBCUTANEOUS at 06:17

## 2025-01-22 RX ADMIN — PAROXETINE HYDROCHLORIDE 30 MG: 20 TABLET, FILM COATED ORAL at 14:20

## 2025-01-22 RX ADMIN — BUMETANIDE 2 MG: 1 TABLET ORAL at 14:20

## 2025-01-22 RX ADMIN — MIDODRINE HYDROCHLORIDE 5 MG: 5 TABLET ORAL at 21:08

## 2025-01-22 RX ADMIN — FERROUS SULFATE TAB 325 MG (65 MG ELEMENTAL FE) 325 MG: 325 (65 FE) TAB at 16:54

## 2025-01-22 RX ADMIN — MONTELUKAST 10 MG: 10 TABLET, FILM COATED ORAL at 14:31

## 2025-01-22 RX ADMIN — SODIUM CHLORIDE, PRESERVATIVE FREE 10 ML: 5 INJECTION INTRAVENOUS at 14:21

## 2025-01-22 ASSESSMENT — PAIN SCALES - GENERAL
PAINLEVEL_OUTOF10: 3
PAINLEVEL_OUTOF10: 0

## 2025-01-22 ASSESSMENT — PAIN DESCRIPTION - DESCRIPTORS: DESCRIPTORS: ACHING

## 2025-01-22 ASSESSMENT — PAIN DESCRIPTION - ORIENTATION: ORIENTATION: RIGHT;LEFT

## 2025-01-22 ASSESSMENT — PAIN DESCRIPTION - LOCATION: LOCATION: KNEE

## 2025-01-22 ASSESSMENT — PAIN SCALES - WONG BAKER: WONGBAKER_NUMERICALRESPONSE: HURTS A LITTLE BIT

## 2025-01-22 NOTE — CARE COORDINATION
Transition of Care Plan:    RUR: 16%  Prior Level of Functioning:   Disposition: SNF  EVIE:   If SNF or IPR: Date FOC offered:   Date FOC received: 1/20  Accepting facility: [facility]  Date authorization started with reference number:   Date authorization received and expires:   Follow up appointments:   DME needed: n/a  Transportation at discharge: family vs [facility]  IM/IMM Medicare/ letter given: 2nd IM needed  Is patient a [facility] and connected with [facility]?    If yes, was [facility] transfer form completed and [facility] notified?   Caregiver Contact:   Discharge Caregiver contacted prior to discharge?   Care Conference needed?   Barriers to discharge:     CM selected the [facility] & requested they initiate Auth.  CM attempted to meet w/pt, but pt was off the unit. CM will continue to follow.    Jennifer Vaughn  Ext [phone]

## 2025-01-23 LAB
ANION GAP SERPL CALC-SCNC: 7 MMOL/L (ref 2–12)
BUN SERPL-MCNC: 34 MG/DL (ref 6–20)
BUN/CREAT SERPL: 7 (ref 12–20)
CALCIUM SERPL-MCNC: 9.3 MG/DL (ref 8.5–10.1)
CHLORIDE SERPL-SCNC: 97 MMOL/L (ref 97–108)
CO2 SERPL-SCNC: 29 MMOL/L (ref 21–32)
CREAT SERPL-MCNC: 4.92 MG/DL (ref 0.55–1.02)
ERYTHROCYTE [DISTWIDTH] IN BLOOD BY AUTOMATED COUNT: 15.3 % (ref 11.5–14.5)
GLUCOSE BLD STRIP.AUTO-MCNC: 170 MG/DL (ref 65–117)
GLUCOSE BLD STRIP.AUTO-MCNC: 200 MG/DL (ref 65–117)
GLUCOSE BLD STRIP.AUTO-MCNC: 233 MG/DL (ref 65–117)
GLUCOSE BLD STRIP.AUTO-MCNC: 299 MG/DL (ref 65–117)
GLUCOSE SERPL-MCNC: 222 MG/DL (ref 65–100)
HCT VFR BLD AUTO: 37 % (ref 35–47)
HGB BLD-MCNC: 11.1 G/DL (ref 11.5–16)
MCH RBC QN AUTO: 28 PG (ref 26–34)
MCHC RBC AUTO-ENTMCNC: 30 G/DL (ref 30–36.5)
MCV RBC AUTO: 93.4 FL (ref 80–99)
NRBC # BLD: 0 K/UL (ref 0–0.01)
NRBC BLD-RTO: 0 PER 100 WBC
PLATELET # BLD AUTO: 168 K/UL (ref 150–400)
PMV BLD AUTO: 11.6 FL (ref 8.9–12.9)
POTASSIUM SERPL-SCNC: 4.5 MMOL/L (ref 3.5–5.1)
RBC # BLD AUTO: 3.96 M/UL (ref 3.8–5.2)
SERVICE CMNT-IMP: ABNORMAL
SODIUM SERPL-SCNC: 133 MMOL/L (ref 136–145)
WBC # BLD AUTO: 10.9 K/UL (ref 3.6–11)

## 2025-01-23 PROCEDURE — 6370000000 HC RX 637 (ALT 250 FOR IP): Performed by: INTERNAL MEDICINE

## 2025-01-23 PROCEDURE — 85027 COMPLETE CBC AUTOMATED: CPT

## 2025-01-23 PROCEDURE — 80048 BASIC METABOLIC PNL TOTAL CA: CPT

## 2025-01-23 PROCEDURE — 6360000002 HC RX W HCPCS: Performed by: STUDENT IN AN ORGANIZED HEALTH CARE EDUCATION/TRAINING PROGRAM

## 2025-01-23 PROCEDURE — 6370000000 HC RX 637 (ALT 250 FOR IP): Performed by: STUDENT IN AN ORGANIZED HEALTH CARE EDUCATION/TRAINING PROGRAM

## 2025-01-23 PROCEDURE — 82962 GLUCOSE BLOOD TEST: CPT

## 2025-01-23 PROCEDURE — 36415 COLL VENOUS BLD VENIPUNCTURE: CPT

## 2025-01-23 PROCEDURE — 2500000003 HC RX 250 WO HCPCS: Performed by: STUDENT IN AN ORGANIZED HEALTH CARE EDUCATION/TRAINING PROGRAM

## 2025-01-23 PROCEDURE — 97535 SELF CARE MNGMENT TRAINING: CPT

## 2025-01-23 PROCEDURE — 97530 THERAPEUTIC ACTIVITIES: CPT | Performed by: PHYSICAL THERAPIST

## 2025-01-23 PROCEDURE — 97116 GAIT TRAINING THERAPY: CPT | Performed by: PHYSICAL THERAPIST

## 2025-01-23 PROCEDURE — 97530 THERAPEUTIC ACTIVITIES: CPT

## 2025-01-23 PROCEDURE — 1100000003 HC PRIVATE W/ TELEMETRY

## 2025-01-23 PROCEDURE — 94640 AIRWAY INHALATION TREATMENT: CPT

## 2025-01-23 PROCEDURE — 94761 N-INVAS EAR/PLS OXIMETRY MLT: CPT

## 2025-01-23 RX ORDER — CARVEDILOL 6.25 MG/1
6.25 TABLET ORAL 2 TIMES DAILY WITH MEALS
Status: DISCONTINUED | OUTPATIENT
Start: 2025-01-23 | End: 2025-01-24

## 2025-01-23 RX ADMIN — MIDODRINE HYDROCHLORIDE 5 MG: 5 TABLET ORAL at 21:21

## 2025-01-23 RX ADMIN — INSULIN LISPRO 4 UNITS: 100 INJECTION, SOLUTION INTRAVENOUS; SUBCUTANEOUS at 17:01

## 2025-01-23 RX ADMIN — INSULIN LISPRO 2 UNITS: 100 INJECTION, SOLUTION INTRAVENOUS; SUBCUTANEOUS at 09:00

## 2025-01-23 RX ADMIN — PAROXETINE HYDROCHLORIDE 30 MG: 20 TABLET, FILM COATED ORAL at 08:54

## 2025-01-23 RX ADMIN — INSULIN LISPRO 2 UNITS: 100 INJECTION, SOLUTION INTRAVENOUS; SUBCUTANEOUS at 21:38

## 2025-01-23 RX ADMIN — FAMOTIDINE 10 MG: 20 TABLET, FILM COATED ORAL at 08:53

## 2025-01-23 RX ADMIN — ASPIRIN 81 MG: 81 TABLET, COATED ORAL at 08:54

## 2025-01-23 RX ADMIN — SODIUM CHLORIDE, PRESERVATIVE FREE 10 ML: 5 INJECTION INTRAVENOUS at 21:21

## 2025-01-23 RX ADMIN — HEPARIN SODIUM 5000 UNITS: 5000 INJECTION INTRAVENOUS; SUBCUTANEOUS at 14:09

## 2025-01-23 RX ADMIN — BUMETANIDE 1 MG: 1 TABLET ORAL at 08:54

## 2025-01-23 RX ADMIN — QUETIAPINE FUMARATE 25 MG: 25 TABLET ORAL at 14:08

## 2025-01-23 RX ADMIN — MIDODRINE HYDROCHLORIDE 5 MG: 5 TABLET ORAL at 08:53

## 2025-01-23 RX ADMIN — SODIUM CHLORIDE, PRESERVATIVE FREE 10 ML: 5 INJECTION INTRAVENOUS at 08:57

## 2025-01-23 RX ADMIN — MIDODRINE HYDROCHLORIDE 5 MG: 5 TABLET ORAL at 14:09

## 2025-01-23 RX ADMIN — INSULIN GLARGINE 30 UNITS: 100 INJECTION, SOLUTION SUBCUTANEOUS at 08:57

## 2025-01-23 RX ADMIN — FERROUS SULFATE TAB 325 MG (65 MG ELEMENTAL FE) 325 MG: 325 (65 FE) TAB at 16:35

## 2025-01-23 RX ADMIN — MONTELUKAST 10 MG: 10 TABLET, FILM COATED ORAL at 08:54

## 2025-01-23 RX ADMIN — ATORVASTATIN CALCIUM 40 MG: 40 TABLET, FILM COATED ORAL at 08:53

## 2025-01-23 RX ADMIN — FERROUS SULFATE TAB 325 MG (65 MG ELEMENTAL FE) 325 MG: 325 (65 FE) TAB at 08:53

## 2025-01-23 RX ADMIN — ARFORMOTEROL TARTRATE: 15 SOLUTION RESPIRATORY (INHALATION) at 08:01

## 2025-01-23 RX ADMIN — BUMETANIDE 1 MG: 1 TABLET ORAL at 21:21

## 2025-01-23 RX ADMIN — HEPARIN SODIUM 5000 UNITS: 5000 INJECTION INTRAVENOUS; SUBCUTANEOUS at 23:59

## 2025-01-23 RX ADMIN — QUETIAPINE FUMARATE 25 MG: 25 TABLET ORAL at 08:54

## 2025-01-23 RX ADMIN — QUETIAPINE FUMARATE 25 MG: 25 TABLET ORAL at 21:21

## 2025-01-23 RX ADMIN — HEPARIN SODIUM 5000 UNITS: 5000 INJECTION INTRAVENOUS; SUBCUTANEOUS at 05:56

## 2025-01-23 RX ADMIN — ARFORMOTEROL TARTRATE: 15 SOLUTION RESPIRATORY (INHALATION) at 20:15

## 2025-01-23 NOTE — CARE COORDINATION
Transition of Care Plan:     RUR: 14%  Prior Level of Functioning:   Disposition: SNF - -[facility]  EVIE:   If SNF or IPR: Date FOC offered:   Date FOC received: 1/20  Accepting facility: [facility]  Date authorization started with reference number: 1/22 afternoon  Date authorization received and expires:   Follow up appointments:   DME needed: n/a  Transportation at discharge: family vs [facility]  IM/IMM Medicare/ letter given: 2nd IM needed  Is patient a [place] and connected with VA?               If yes, was  transfer form completed and VA notified?   Caregiver Contact:   Discharge Caregiver contacted prior to discharge?   Care Conference needed?   Barriers to discharge: Auth    CM verified pt is in agreement with d/c to SNF.  Patient confirmed she is to return home when d/c from SNF.  CM will continue to follow.    Jennifer Vaughn  Ext [phone]

## 2025-01-24 LAB
ANION GAP SERPL CALC-SCNC: 9 MMOL/L (ref 2–12)
BUN SERPL-MCNC: 53 MG/DL (ref 6–20)
BUN/CREAT SERPL: 8 (ref 12–20)
CALCIUM SERPL-MCNC: 8.9 MG/DL (ref 8.5–10.1)
CHLORIDE SERPL-SCNC: 99 MMOL/L (ref 97–108)
CO2 SERPL-SCNC: 27 MMOL/L (ref 21–32)
CREAT SERPL-MCNC: 6.3 MG/DL (ref 0.55–1.02)
ERYTHROCYTE [DISTWIDTH] IN BLOOD BY AUTOMATED COUNT: 15 % (ref 11.5–14.5)
GLUCOSE BLD STRIP.AUTO-MCNC: 145 MG/DL (ref 65–117)
GLUCOSE BLD STRIP.AUTO-MCNC: 164 MG/DL (ref 65–117)
GLUCOSE BLD STRIP.AUTO-MCNC: 246 MG/DL (ref 65–117)
GLUCOSE BLD STRIP.AUTO-MCNC: 268 MG/DL (ref 65–117)
GLUCOSE SERPL-MCNC: 166 MG/DL (ref 65–100)
HBV SURFACE AB SER QL: REACTIVE
HBV SURFACE AB SER-ACNC: 23.49 MIU/ML
HBV SURFACE AG SER QL: 0.29 INDEX
HBV SURFACE AG SER QL: NEGATIVE
HCT VFR BLD AUTO: 34 % (ref 35–47)
HGB BLD-MCNC: 10.3 G/DL (ref 11.5–16)
MCH RBC QN AUTO: 27.4 PG (ref 26–34)
MCHC RBC AUTO-ENTMCNC: 30.3 G/DL (ref 30–36.5)
MCV RBC AUTO: 90.4 FL (ref 80–99)
NRBC # BLD: 0 K/UL (ref 0–0.01)
NRBC BLD-RTO: 0 PER 100 WBC
PLATELET # BLD AUTO: 166 K/UL (ref 150–400)
PMV BLD AUTO: 11.7 FL (ref 8.9–12.9)
POTASSIUM SERPL-SCNC: 4.2 MMOL/L (ref 3.5–5.1)
RBC # BLD AUTO: 3.76 M/UL (ref 3.8–5.2)
SERVICE CMNT-IMP: ABNORMAL
SODIUM SERPL-SCNC: 135 MMOL/L (ref 136–145)
WBC # BLD AUTO: 12.4 K/UL (ref 3.6–11)

## 2025-01-24 PROCEDURE — 36415 COLL VENOUS BLD VENIPUNCTURE: CPT

## 2025-01-24 PROCEDURE — 90935 HEMODIALYSIS ONE EVALUATION: CPT

## 2025-01-24 PROCEDURE — 6370000000 HC RX 637 (ALT 250 FOR IP): Performed by: STUDENT IN AN ORGANIZED HEALTH CARE EDUCATION/TRAINING PROGRAM

## 2025-01-24 PROCEDURE — 6360000002 HC RX W HCPCS: Performed by: STUDENT IN AN ORGANIZED HEALTH CARE EDUCATION/TRAINING PROGRAM

## 2025-01-24 PROCEDURE — 94761 N-INVAS EAR/PLS OXIMETRY MLT: CPT

## 2025-01-24 PROCEDURE — 85027 COMPLETE CBC AUTOMATED: CPT

## 2025-01-24 PROCEDURE — 2500000003 HC RX 250 WO HCPCS: Performed by: STUDENT IN AN ORGANIZED HEALTH CARE EDUCATION/TRAINING PROGRAM

## 2025-01-24 PROCEDURE — 6370000000 HC RX 637 (ALT 250 FOR IP): Performed by: INTERNAL MEDICINE

## 2025-01-24 PROCEDURE — 1100000003 HC PRIVATE W/ TELEMETRY

## 2025-01-24 PROCEDURE — 80048 BASIC METABOLIC PNL TOTAL CA: CPT

## 2025-01-24 PROCEDURE — 86706 HEP B SURFACE ANTIBODY: CPT

## 2025-01-24 PROCEDURE — 87340 HEPATITIS B SURFACE AG IA: CPT

## 2025-01-24 PROCEDURE — 94640 AIRWAY INHALATION TREATMENT: CPT

## 2025-01-24 PROCEDURE — 82962 GLUCOSE BLOOD TEST: CPT

## 2025-01-24 RX ORDER — CARVEDILOL 6.25 MG/1
12.5 TABLET ORAL 2 TIMES DAILY WITH MEALS
Status: DISCONTINUED | OUTPATIENT
Start: 2025-01-24 | End: 2025-01-29 | Stop reason: HOSPADM

## 2025-01-24 RX ADMIN — FERROUS SULFATE TAB 325 MG (65 MG ELEMENTAL FE) 325 MG: 325 (65 FE) TAB at 12:47

## 2025-01-24 RX ADMIN — MIDODRINE HYDROCHLORIDE 5 MG: 5 TABLET ORAL at 12:49

## 2025-01-24 RX ADMIN — SODIUM CHLORIDE, PRESERVATIVE FREE 10 ML: 5 INJECTION INTRAVENOUS at 20:53

## 2025-01-24 RX ADMIN — MONTELUKAST 10 MG: 10 TABLET, FILM COATED ORAL at 12:46

## 2025-01-24 RX ADMIN — ARFORMOTEROL TARTRATE: 15 SOLUTION RESPIRATORY (INHALATION) at 20:37

## 2025-01-24 RX ADMIN — WATER 1000 MG: 1 INJECTION INTRAMUSCULAR; INTRAVENOUS; SUBCUTANEOUS at 00:01

## 2025-01-24 RX ADMIN — ATORVASTATIN CALCIUM 40 MG: 40 TABLET, FILM COATED ORAL at 12:45

## 2025-01-24 RX ADMIN — FAMOTIDINE 10 MG: 20 TABLET, FILM COATED ORAL at 12:47

## 2025-01-24 RX ADMIN — MIDODRINE HYDROCHLORIDE 5 MG: 5 TABLET ORAL at 20:53

## 2025-01-24 RX ADMIN — INSULIN LISPRO 2 UNITS: 100 INJECTION, SOLUTION INTRAVENOUS; SUBCUTANEOUS at 20:54

## 2025-01-24 RX ADMIN — ASPIRIN 81 MG: 81 TABLET, COATED ORAL at 12:46

## 2025-01-24 RX ADMIN — PAROXETINE HYDROCHLORIDE 30 MG: 20 TABLET, FILM COATED ORAL at 12:45

## 2025-01-24 RX ADMIN — QUETIAPINE FUMARATE 25 MG: 25 TABLET ORAL at 20:53

## 2025-01-24 RX ADMIN — BUMETANIDE 1 MG: 1 TABLET ORAL at 12:48

## 2025-01-24 RX ADMIN — FERROUS SULFATE TAB 325 MG (65 MG ELEMENTAL FE) 325 MG: 325 (65 FE) TAB at 18:09

## 2025-01-24 RX ADMIN — HEPARIN SODIUM 5000 UNITS: 5000 INJECTION INTRAVENOUS; SUBCUTANEOUS at 12:57

## 2025-01-24 RX ADMIN — SODIUM CHLORIDE, PRESERVATIVE FREE 10 ML: 5 INJECTION INTRAVENOUS at 07:45

## 2025-01-24 RX ADMIN — BUMETANIDE 1 MG: 1 TABLET ORAL at 20:53

## 2025-01-24 RX ADMIN — HEPARIN SODIUM 5000 UNITS: 5000 INJECTION INTRAVENOUS; SUBCUTANEOUS at 05:39

## 2025-01-24 RX ADMIN — QUETIAPINE FUMARATE 25 MG: 25 TABLET ORAL at 12:47

## 2025-01-24 RX ADMIN — HEPARIN SODIUM 5000 UNITS: 5000 INJECTION INTRAVENOUS; SUBCUTANEOUS at 22:09

## 2025-01-24 RX ADMIN — INSULIN LISPRO 4 UNITS: 100 INJECTION, SOLUTION INTRAVENOUS; SUBCUTANEOUS at 18:09

## 2025-01-24 NOTE — DIALYSIS
RN patient assessment performed for (LPN): Hieu Lewis  Vascular Access type/ assessment: AVG  Respiratory status: Unlabored  Cardiac Status: RRR  Edema: None  Skin assessment: Dry, Warm  A&Ox : 4  VS reviewed: Yes  Hep B results, dates, and Primary Source:   Hep B Surface Ag Negative 12/16/24- [facility]OW  Hep B Surface Ab Immune 1/29/24- CWOW    Machine disinfect process:Acid/Heat

## 2025-01-25 LAB
ANION GAP SERPL CALC-SCNC: 8 MMOL/L (ref 2–12)
BUN SERPL-MCNC: 36 MG/DL (ref 6–20)
BUN/CREAT SERPL: 8 (ref 12–20)
CALCIUM SERPL-MCNC: 9.5 MG/DL (ref 8.5–10.1)
CHLORIDE SERPL-SCNC: 97 MMOL/L (ref 97–108)
CO2 SERPL-SCNC: 29 MMOL/L (ref 21–32)
CREAT SERPL-MCNC: 4.67 MG/DL (ref 0.55–1.02)
ERYTHROCYTE [DISTWIDTH] IN BLOOD BY AUTOMATED COUNT: 15.3 % (ref 11.5–14.5)
GLUCOSE BLD STRIP.AUTO-MCNC: 212 MG/DL (ref 65–117)
GLUCOSE BLD STRIP.AUTO-MCNC: 222 MG/DL (ref 65–117)
GLUCOSE BLD STRIP.AUTO-MCNC: 249 MG/DL (ref 65–117)
GLUCOSE BLD STRIP.AUTO-MCNC: 252 MG/DL (ref 65–117)
GLUCOSE SERPL-MCNC: 260 MG/DL (ref 65–100)
HCT VFR BLD AUTO: 35.9 % (ref 35–47)
HGB BLD-MCNC: 10.8 G/DL (ref 11.5–16)
MCH RBC QN AUTO: 27.7 PG (ref 26–34)
MCHC RBC AUTO-ENTMCNC: 30.1 G/DL (ref 30–36.5)
MCV RBC AUTO: 92.1 FL (ref 80–99)
NRBC # BLD: 0 K/UL (ref 0–0.01)
NRBC BLD-RTO: 0 PER 100 WBC
PLATELET # BLD AUTO: 147 K/UL (ref 150–400)
PMV BLD AUTO: 11.6 FL (ref 8.9–12.9)
POTASSIUM SERPL-SCNC: 4.3 MMOL/L (ref 3.5–5.1)
RBC # BLD AUTO: 3.9 M/UL (ref 3.8–5.2)
SERVICE CMNT-IMP: ABNORMAL
SODIUM SERPL-SCNC: 134 MMOL/L (ref 136–145)
WBC # BLD AUTO: 10.2 K/UL (ref 3.6–11)

## 2025-01-25 PROCEDURE — 94640 AIRWAY INHALATION TREATMENT: CPT

## 2025-01-25 PROCEDURE — 6360000002 HC RX W HCPCS: Performed by: STUDENT IN AN ORGANIZED HEALTH CARE EDUCATION/TRAINING PROGRAM

## 2025-01-25 PROCEDURE — 6370000000 HC RX 637 (ALT 250 FOR IP): Performed by: INTERNAL MEDICINE

## 2025-01-25 PROCEDURE — 36415 COLL VENOUS BLD VENIPUNCTURE: CPT

## 2025-01-25 PROCEDURE — 80048 BASIC METABOLIC PNL TOTAL CA: CPT

## 2025-01-25 PROCEDURE — 94761 N-INVAS EAR/PLS OXIMETRY MLT: CPT

## 2025-01-25 PROCEDURE — 6370000000 HC RX 637 (ALT 250 FOR IP): Performed by: STUDENT IN AN ORGANIZED HEALTH CARE EDUCATION/TRAINING PROGRAM

## 2025-01-25 PROCEDURE — 82962 GLUCOSE BLOOD TEST: CPT

## 2025-01-25 PROCEDURE — 1100000003 HC PRIVATE W/ TELEMETRY

## 2025-01-25 PROCEDURE — 2500000003 HC RX 250 WO HCPCS: Performed by: STUDENT IN AN ORGANIZED HEALTH CARE EDUCATION/TRAINING PROGRAM

## 2025-01-25 PROCEDURE — 85027 COMPLETE CBC AUTOMATED: CPT

## 2025-01-25 RX ADMIN — SODIUM CHLORIDE, PRESERVATIVE FREE 10 ML: 5 INJECTION INTRAVENOUS at 08:06

## 2025-01-25 RX ADMIN — MONTELUKAST 10 MG: 10 TABLET, FILM COATED ORAL at 08:00

## 2025-01-25 RX ADMIN — ASPIRIN 81 MG: 81 TABLET, COATED ORAL at 08:00

## 2025-01-25 RX ADMIN — QUETIAPINE FUMARATE 25 MG: 25 TABLET ORAL at 08:00

## 2025-01-25 RX ADMIN — QUETIAPINE FUMARATE 25 MG: 25 TABLET ORAL at 13:44

## 2025-01-25 RX ADMIN — FERROUS SULFATE TAB 325 MG (65 MG ELEMENTAL FE) 325 MG: 325 (65 FE) TAB at 08:00

## 2025-01-25 RX ADMIN — INSULIN LISPRO 2 UNITS: 100 INJECTION, SOLUTION INTRAVENOUS; SUBCUTANEOUS at 17:37

## 2025-01-25 RX ADMIN — FERROUS SULFATE TAB 325 MG (65 MG ELEMENTAL FE) 325 MG: 325 (65 FE) TAB at 17:37

## 2025-01-25 RX ADMIN — HEPARIN SODIUM 5000 UNITS: 5000 INJECTION INTRAVENOUS; SUBCUTANEOUS at 05:38

## 2025-01-25 RX ADMIN — WATER 1000 MG: 1 INJECTION INTRAMUSCULAR; INTRAVENOUS; SUBCUTANEOUS at 23:45

## 2025-01-25 RX ADMIN — INSULIN LISPRO 2 UNITS: 100 INJECTION, SOLUTION INTRAVENOUS; SUBCUTANEOUS at 06:27

## 2025-01-25 RX ADMIN — HEPARIN SODIUM 5000 UNITS: 5000 INJECTION INTRAVENOUS; SUBCUTANEOUS at 13:44

## 2025-01-25 RX ADMIN — CARVEDILOL 12.5 MG: 6.25 TABLET, FILM COATED ORAL at 08:00

## 2025-01-25 RX ADMIN — INSULIN LISPRO 4 UNITS: 100 INJECTION, SOLUTION INTRAVENOUS; SUBCUTANEOUS at 11:41

## 2025-01-25 RX ADMIN — WATER 1000 MG: 1 INJECTION INTRAMUSCULAR; INTRAVENOUS; SUBCUTANEOUS at 00:21

## 2025-01-25 RX ADMIN — MIDODRINE HYDROCHLORIDE 5 MG: 5 TABLET ORAL at 08:00

## 2025-01-25 RX ADMIN — HEPARIN SODIUM 5000 UNITS: 5000 INJECTION INTRAVENOUS; SUBCUTANEOUS at 21:47

## 2025-01-25 RX ADMIN — INSULIN GLARGINE 30 UNITS: 100 INJECTION, SOLUTION SUBCUTANEOUS at 08:00

## 2025-01-25 RX ADMIN — SODIUM CHLORIDE, PRESERVATIVE FREE 10 ML: 5 INJECTION INTRAVENOUS at 20:53

## 2025-01-25 RX ADMIN — INSULIN LISPRO 2 UNITS: 100 INJECTION, SOLUTION INTRAVENOUS; SUBCUTANEOUS at 21:47

## 2025-01-25 RX ADMIN — PAROXETINE HYDROCHLORIDE 30 MG: 20 TABLET, FILM COATED ORAL at 08:00

## 2025-01-25 RX ADMIN — BUMETANIDE 1 MG: 1 TABLET ORAL at 08:00

## 2025-01-25 RX ADMIN — MIDODRINE HYDROCHLORIDE 5 MG: 5 TABLET ORAL at 13:44

## 2025-01-25 RX ADMIN — FAMOTIDINE 10 MG: 20 TABLET, FILM COATED ORAL at 08:00

## 2025-01-25 RX ADMIN — MIDODRINE HYDROCHLORIDE 5 MG: 5 TABLET ORAL at 20:51

## 2025-01-25 RX ADMIN — ATORVASTATIN CALCIUM 40 MG: 40 TABLET, FILM COATED ORAL at 08:00

## 2025-01-25 RX ADMIN — ARFORMOTEROL TARTRATE: 15 SOLUTION RESPIRATORY (INHALATION) at 09:07

## 2025-01-25 RX ADMIN — BUMETANIDE 1 MG: 1 TABLET ORAL at 20:51

## 2025-01-25 RX ADMIN — QUETIAPINE FUMARATE 25 MG: 25 TABLET ORAL at 20:51

## 2025-01-26 LAB
ANION GAP SERPL CALC-SCNC: 9 MMOL/L (ref 2–12)
BUN SERPL-MCNC: 59 MG/DL (ref 6–20)
BUN/CREAT SERPL: 9 (ref 12–20)
CALCIUM SERPL-MCNC: 9 MG/DL (ref 8.5–10.1)
CHLORIDE SERPL-SCNC: 96 MMOL/L (ref 97–108)
CO2 SERPL-SCNC: 29 MMOL/L (ref 21–32)
CREAT SERPL-MCNC: 6.4 MG/DL (ref 0.55–1.02)
ERYTHROCYTE [DISTWIDTH] IN BLOOD BY AUTOMATED COUNT: 15.1 % (ref 11.5–14.5)
GLUCOSE BLD STRIP.AUTO-MCNC: 203 MG/DL (ref 65–117)
GLUCOSE BLD STRIP.AUTO-MCNC: 219 MG/DL (ref 65–117)
GLUCOSE BLD STRIP.AUTO-MCNC: 245 MG/DL (ref 65–117)
GLUCOSE BLD STRIP.AUTO-MCNC: 297 MG/DL (ref 65–117)
GLUCOSE SERPL-MCNC: 221 MG/DL (ref 65–100)
HCT VFR BLD AUTO: 33.9 % (ref 35–47)
HGB BLD-MCNC: 10.3 G/DL (ref 11.5–16)
MCH RBC QN AUTO: 28.3 PG (ref 26–34)
MCHC RBC AUTO-ENTMCNC: 30.4 G/DL (ref 30–36.5)
MCV RBC AUTO: 93.1 FL (ref 80–99)
NRBC # BLD: 0 K/UL (ref 0–0.01)
NRBC BLD-RTO: 0 PER 100 WBC
PLATELET # BLD AUTO: 151 K/UL (ref 150–400)
PMV BLD AUTO: 11.3 FL (ref 8.9–12.9)
POTASSIUM SERPL-SCNC: 4.5 MMOL/L (ref 3.5–5.1)
RBC # BLD AUTO: 3.64 M/UL (ref 3.8–5.2)
SERVICE CMNT-IMP: ABNORMAL
SODIUM SERPL-SCNC: 134 MMOL/L (ref 136–145)
WBC # BLD AUTO: 13.2 K/UL (ref 3.6–11)

## 2025-01-26 PROCEDURE — 6360000002 HC RX W HCPCS: Performed by: STUDENT IN AN ORGANIZED HEALTH CARE EDUCATION/TRAINING PROGRAM

## 2025-01-26 PROCEDURE — 82962 GLUCOSE BLOOD TEST: CPT

## 2025-01-26 PROCEDURE — 2500000003 HC RX 250 WO HCPCS: Performed by: STUDENT IN AN ORGANIZED HEALTH CARE EDUCATION/TRAINING PROGRAM

## 2025-01-26 PROCEDURE — 1100000003 HC PRIVATE W/ TELEMETRY

## 2025-01-26 PROCEDURE — 85027 COMPLETE CBC AUTOMATED: CPT

## 2025-01-26 PROCEDURE — 94640 AIRWAY INHALATION TREATMENT: CPT

## 2025-01-26 PROCEDURE — 6370000000 HC RX 637 (ALT 250 FOR IP): Performed by: INTERNAL MEDICINE

## 2025-01-26 PROCEDURE — 94761 N-INVAS EAR/PLS OXIMETRY MLT: CPT

## 2025-01-26 PROCEDURE — 6370000000 HC RX 637 (ALT 250 FOR IP): Performed by: STUDENT IN AN ORGANIZED HEALTH CARE EDUCATION/TRAINING PROGRAM

## 2025-01-26 PROCEDURE — 80048 BASIC METABOLIC PNL TOTAL CA: CPT

## 2025-01-26 PROCEDURE — 36415 COLL VENOUS BLD VENIPUNCTURE: CPT

## 2025-01-26 RX ADMIN — FERROUS SULFATE TAB 325 MG (65 MG ELEMENTAL FE) 325 MG: 325 (65 FE) TAB at 07:56

## 2025-01-26 RX ADMIN — ATORVASTATIN CALCIUM 40 MG: 40 TABLET, FILM COATED ORAL at 07:57

## 2025-01-26 RX ADMIN — QUETIAPINE FUMARATE 25 MG: 25 TABLET ORAL at 07:57

## 2025-01-26 RX ADMIN — HEPARIN SODIUM 5000 UNITS: 5000 INJECTION INTRAVENOUS; SUBCUTANEOUS at 13:31

## 2025-01-26 RX ADMIN — INSULIN LISPRO 2 UNITS: 100 INJECTION, SOLUTION INTRAVENOUS; SUBCUTANEOUS at 12:01

## 2025-01-26 RX ADMIN — FERROUS SULFATE TAB 325 MG (65 MG ELEMENTAL FE) 325 MG: 325 (65 FE) TAB at 17:31

## 2025-01-26 RX ADMIN — INSULIN LISPRO 4 UNITS: 100 INJECTION, SOLUTION INTRAVENOUS; SUBCUTANEOUS at 21:25

## 2025-01-26 RX ADMIN — SODIUM CHLORIDE, PRESERVATIVE FREE 10 ML: 5 INJECTION INTRAVENOUS at 07:58

## 2025-01-26 RX ADMIN — ARFORMOTEROL TARTRATE: 15 SOLUTION RESPIRATORY (INHALATION) at 08:21

## 2025-01-26 RX ADMIN — INSULIN GLARGINE 30 UNITS: 100 INJECTION, SOLUTION SUBCUTANEOUS at 07:57

## 2025-01-26 RX ADMIN — PAROXETINE HYDROCHLORIDE 30 MG: 20 TABLET, FILM COATED ORAL at 07:57

## 2025-01-26 RX ADMIN — ARFORMOTEROL TARTRATE: 15 SOLUTION RESPIRATORY (INHALATION) at 20:34

## 2025-01-26 RX ADMIN — BUMETANIDE 1 MG: 1 TABLET ORAL at 07:57

## 2025-01-26 RX ADMIN — FAMOTIDINE 10 MG: 20 TABLET, FILM COATED ORAL at 07:57

## 2025-01-26 RX ADMIN — HEPARIN SODIUM 5000 UNITS: 5000 INJECTION INTRAVENOUS; SUBCUTANEOUS at 05:58

## 2025-01-26 RX ADMIN — MIDODRINE HYDROCHLORIDE 5 MG: 5 TABLET ORAL at 21:00

## 2025-01-26 RX ADMIN — HEPARIN SODIUM 5000 UNITS: 5000 INJECTION INTRAVENOUS; SUBCUTANEOUS at 21:25

## 2025-01-26 RX ADMIN — SODIUM CHLORIDE, PRESERVATIVE FREE 10 ML: 5 INJECTION INTRAVENOUS at 21:01

## 2025-01-26 RX ADMIN — MIDODRINE HYDROCHLORIDE 5 MG: 5 TABLET ORAL at 07:57

## 2025-01-26 RX ADMIN — MONTELUKAST 10 MG: 10 TABLET, FILM COATED ORAL at 07:57

## 2025-01-26 RX ADMIN — INSULIN LISPRO 2 UNITS: 100 INJECTION, SOLUTION INTRAVENOUS; SUBCUTANEOUS at 07:57

## 2025-01-26 RX ADMIN — QUETIAPINE FUMARATE 25 MG: 25 TABLET ORAL at 13:31

## 2025-01-26 RX ADMIN — QUETIAPINE FUMARATE 25 MG: 25 TABLET ORAL at 21:00

## 2025-01-26 RX ADMIN — ASPIRIN 81 MG: 81 TABLET, COATED ORAL at 07:57

## 2025-01-26 RX ADMIN — MIDODRINE HYDROCHLORIDE 5 MG: 5 TABLET ORAL at 13:31

## 2025-01-26 RX ADMIN — INSULIN LISPRO 2 UNITS: 100 INJECTION, SOLUTION INTRAVENOUS; SUBCUTANEOUS at 17:31

## 2025-01-26 RX ADMIN — BUMETANIDE 1 MG: 1 TABLET ORAL at 21:00

## 2025-01-27 LAB
ANION GAP SERPL CALC-SCNC: 12 MMOL/L (ref 2–12)
BUN SERPL-MCNC: 74 MG/DL (ref 6–20)
BUN/CREAT SERPL: 10 (ref 12–20)
CALCIUM SERPL-MCNC: 8.8 MG/DL (ref 8.5–10.1)
CHLORIDE SERPL-SCNC: 96 MMOL/L (ref 97–108)
CO2 SERPL-SCNC: 27 MMOL/L (ref 21–32)
CREAT SERPL-MCNC: 7.56 MG/DL (ref 0.55–1.02)
ERYTHROCYTE [DISTWIDTH] IN BLOOD BY AUTOMATED COUNT: 14.8 % (ref 11.5–14.5)
GLUCOSE BLD STRIP.AUTO-MCNC: 132 MG/DL (ref 65–117)
GLUCOSE BLD STRIP.AUTO-MCNC: 200 MG/DL (ref 65–117)
GLUCOSE BLD STRIP.AUTO-MCNC: 217 MG/DL (ref 65–117)
GLUCOSE BLD STRIP.AUTO-MCNC: 282 MG/DL (ref 65–117)
GLUCOSE SERPL-MCNC: 197 MG/DL (ref 65–100)
HCT VFR BLD AUTO: 34.2 % (ref 35–47)
HGB BLD-MCNC: 10.6 G/DL (ref 11.5–16)
MCH RBC QN AUTO: 28.8 PG (ref 26–34)
MCHC RBC AUTO-ENTMCNC: 31 G/DL (ref 30–36.5)
MCV RBC AUTO: 92.9 FL (ref 80–99)
NRBC # BLD: 0 K/UL (ref 0–0.01)
NRBC BLD-RTO: 0 PER 100 WBC
PLATELET # BLD AUTO: 159 K/UL (ref 150–400)
PMV BLD AUTO: 12 FL (ref 8.9–12.9)
POTASSIUM SERPL-SCNC: 4.4 MMOL/L (ref 3.5–5.1)
RBC # BLD AUTO: 3.68 M/UL (ref 3.8–5.2)
SERVICE CMNT-IMP: ABNORMAL
SODIUM SERPL-SCNC: 135 MMOL/L (ref 136–145)
WBC # BLD AUTO: 12.6 K/UL (ref 3.6–11)

## 2025-01-27 PROCEDURE — 94761 N-INVAS EAR/PLS OXIMETRY MLT: CPT

## 2025-01-27 PROCEDURE — 6360000002 HC RX W HCPCS: Performed by: STUDENT IN AN ORGANIZED HEALTH CARE EDUCATION/TRAINING PROGRAM

## 2025-01-27 PROCEDURE — 6370000000 HC RX 637 (ALT 250 FOR IP): Performed by: INTERNAL MEDICINE

## 2025-01-27 PROCEDURE — 97535 SELF CARE MNGMENT TRAINING: CPT

## 2025-01-27 PROCEDURE — 97530 THERAPEUTIC ACTIVITIES: CPT

## 2025-01-27 PROCEDURE — 90935 HEMODIALYSIS ONE EVALUATION: CPT

## 2025-01-27 PROCEDURE — 82962 GLUCOSE BLOOD TEST: CPT

## 2025-01-27 PROCEDURE — 1100000003 HC PRIVATE W/ TELEMETRY

## 2025-01-27 PROCEDURE — 2500000003 HC RX 250 WO HCPCS: Performed by: STUDENT IN AN ORGANIZED HEALTH CARE EDUCATION/TRAINING PROGRAM

## 2025-01-27 PROCEDURE — 36415 COLL VENOUS BLD VENIPUNCTURE: CPT

## 2025-01-27 PROCEDURE — 6370000000 HC RX 637 (ALT 250 FOR IP): Performed by: STUDENT IN AN ORGANIZED HEALTH CARE EDUCATION/TRAINING PROGRAM

## 2025-01-27 PROCEDURE — 85027 COMPLETE CBC AUTOMATED: CPT

## 2025-01-27 PROCEDURE — 80048 BASIC METABOLIC PNL TOTAL CA: CPT

## 2025-01-27 PROCEDURE — 94640 AIRWAY INHALATION TREATMENT: CPT

## 2025-01-27 RX ORDER — FLUTICASONE PROPIONATE 50 MCG
1 SPRAY, SUSPENSION (ML) NASAL DAILY PRN
Status: DISCONTINUED | OUTPATIENT
Start: 2025-01-27 | End: 2025-01-29 | Stop reason: HOSPADM

## 2025-01-27 RX ADMIN — INSULIN LISPRO 2 UNITS: 100 INJECTION, SOLUTION INTRAVENOUS; SUBCUTANEOUS at 22:08

## 2025-01-27 RX ADMIN — ARFORMOTEROL TARTRATE: 15 SOLUTION RESPIRATORY (INHALATION) at 22:00

## 2025-01-27 RX ADMIN — ASPIRIN 81 MG: 81 TABLET, COATED ORAL at 13:09

## 2025-01-27 RX ADMIN — HEPARIN SODIUM 5000 UNITS: 5000 INJECTION INTRAVENOUS; SUBCUTANEOUS at 05:45

## 2025-01-27 RX ADMIN — BUMETANIDE 1 MG: 1 TABLET ORAL at 13:08

## 2025-01-27 RX ADMIN — SODIUM CHLORIDE, PRESERVATIVE FREE 10 ML: 5 INJECTION INTRAVENOUS at 13:09

## 2025-01-27 RX ADMIN — MONTELUKAST 10 MG: 10 TABLET, FILM COATED ORAL at 13:08

## 2025-01-27 RX ADMIN — ACETAMINOPHEN 650 MG: 325 TABLET ORAL at 21:55

## 2025-01-27 RX ADMIN — FERROUS SULFATE TAB 325 MG (65 MG ELEMENTAL FE) 325 MG: 325 (65 FE) TAB at 17:26

## 2025-01-27 RX ADMIN — HEPARIN SODIUM 5000 UNITS: 5000 INJECTION INTRAVENOUS; SUBCUTANEOUS at 13:07

## 2025-01-27 RX ADMIN — ATORVASTATIN CALCIUM 40 MG: 40 TABLET, FILM COATED ORAL at 13:08

## 2025-01-27 RX ADMIN — INSULIN GLARGINE 30 UNITS: 100 INJECTION, SOLUTION SUBCUTANEOUS at 13:07

## 2025-01-27 RX ADMIN — QUETIAPINE FUMARATE 25 MG: 25 TABLET ORAL at 21:55

## 2025-01-27 RX ADMIN — CARVEDILOL 12.5 MG: 6.25 TABLET, FILM COATED ORAL at 13:09

## 2025-01-27 RX ADMIN — INSULIN LISPRO 4 UNITS: 100 INJECTION, SOLUTION INTRAVENOUS; SUBCUTANEOUS at 18:55

## 2025-01-27 RX ADMIN — MIDODRINE HYDROCHLORIDE 5 MG: 5 TABLET ORAL at 21:55

## 2025-01-27 RX ADMIN — PAROXETINE HYDROCHLORIDE 30 MG: 20 TABLET, FILM COATED ORAL at 13:09

## 2025-01-27 RX ADMIN — MIDODRINE HYDROCHLORIDE 5 MG: 5 TABLET ORAL at 07:57

## 2025-01-27 RX ADMIN — MIDODRINE HYDROCHLORIDE 5 MG: 5 TABLET ORAL at 13:08

## 2025-01-27 RX ADMIN — FERROUS SULFATE TAB 325 MG (65 MG ELEMENTAL FE) 325 MG: 325 (65 FE) TAB at 13:08

## 2025-01-27 RX ADMIN — QUETIAPINE FUMARATE 25 MG: 25 TABLET ORAL at 13:08

## 2025-01-27 RX ADMIN — FAMOTIDINE 10 MG: 20 TABLET, FILM COATED ORAL at 13:09

## 2025-01-27 RX ADMIN — HEPARIN SODIUM 5000 UNITS: 5000 INJECTION INTRAVENOUS; SUBCUTANEOUS at 21:56

## 2025-01-27 ASSESSMENT — PAIN DESCRIPTION - LOCATION: LOCATION: KNEE

## 2025-01-27 ASSESSMENT — PAIN SCALES - GENERAL
PAINLEVEL_OUTOF10: 2
PAINLEVEL_OUTOF10: 6

## 2025-01-27 ASSESSMENT — PAIN DESCRIPTION - ORIENTATION: ORIENTATION: RIGHT

## 2025-01-27 ASSESSMENT — PAIN DESCRIPTION - DESCRIPTORS: DESCRIPTORS: ACHING

## 2025-01-27 NOTE — CARE COORDINATION
Transition of Care Plan:     RUR: 15%  Prior Level of Functioning:   Disposition: SNF - -[facility]  EVIE:   If SNF or IPR: Date FOC offered:   Date FOC received: 1/20  Accepting facility: [facility]  Date authorization started with reference number: 1/22 afternoon - 1/27  Date authorization received and expires:   Follow up appointments:   DME needed: n/a  Transportation at discharge: family vs [facility]  IM/IMM Medicare/ letter given: 2nd IM needed  Is patient a  and connected with VA?               If yes, was [place] transfer form completed and VA notified?   Caregiver Contact:   Discharge Caregiver contacted prior to discharge?   Care Conference needed?   Barriers to discharge: Auth    SNF admitted Auth has not been started, but will start Auth today after they confirm pt's HD w/[facility] [address].  CM will continue to follow.    Jennifer Vaughn  Ext [phone]

## 2025-01-28 LAB
ANION GAP SERPL CALC-SCNC: 8 MMOL/L (ref 2–12)
BUN SERPL-MCNC: 41 MG/DL (ref 6–20)
BUN/CREAT SERPL: 8 (ref 12–20)
CALCIUM SERPL-MCNC: 9 MG/DL (ref 8.5–10.1)
CHLORIDE SERPL-SCNC: 100 MMOL/L (ref 97–108)
CO2 SERPL-SCNC: 27 MMOL/L (ref 21–32)
CREAT SERPL-MCNC: 5.3 MG/DL (ref 0.55–1.02)
ERYTHROCYTE [DISTWIDTH] IN BLOOD BY AUTOMATED COUNT: 15.1 % (ref 11.5–14.5)
GLUCOSE BLD STRIP.AUTO-MCNC: 197 MG/DL (ref 65–117)
GLUCOSE BLD STRIP.AUTO-MCNC: 204 MG/DL (ref 65–117)
GLUCOSE BLD STRIP.AUTO-MCNC: 254 MG/DL (ref 65–117)
GLUCOSE BLD STRIP.AUTO-MCNC: 347 MG/DL (ref 65–117)
GLUCOSE SERPL-MCNC: 257 MG/DL (ref 65–100)
HCT VFR BLD AUTO: 35 % (ref 35–47)
HGB BLD-MCNC: 10.6 G/DL (ref 11.5–16)
MCH RBC QN AUTO: 27.5 PG (ref 26–34)
MCHC RBC AUTO-ENTMCNC: 30.3 G/DL (ref 30–36.5)
MCV RBC AUTO: 90.9 FL (ref 80–99)
NRBC # BLD: 0 K/UL (ref 0–0.01)
NRBC BLD-RTO: 0 PER 100 WBC
PHOSPHATE SERPL-MCNC: 4.8 MG/DL (ref 2.6–4.7)
PLATELET # BLD AUTO: 143 K/UL (ref 150–400)
PMV BLD AUTO: 12.3 FL (ref 8.9–12.9)
POTASSIUM SERPL-SCNC: 4.5 MMOL/L (ref 3.5–5.1)
RBC # BLD AUTO: 3.85 M/UL (ref 3.8–5.2)
SERVICE CMNT-IMP: ABNORMAL
SODIUM SERPL-SCNC: 135 MMOL/L (ref 136–145)
WBC # BLD AUTO: 9.8 K/UL (ref 3.6–11)

## 2025-01-28 PROCEDURE — 6370000000 HC RX 637 (ALT 250 FOR IP): Performed by: STUDENT IN AN ORGANIZED HEALTH CARE EDUCATION/TRAINING PROGRAM

## 2025-01-28 PROCEDURE — 6360000002 HC RX W HCPCS: Performed by: STUDENT IN AN ORGANIZED HEALTH CARE EDUCATION/TRAINING PROGRAM

## 2025-01-28 PROCEDURE — 80048 BASIC METABOLIC PNL TOTAL CA: CPT

## 2025-01-28 PROCEDURE — 84100 ASSAY OF PHOSPHORUS: CPT

## 2025-01-28 PROCEDURE — 85027 COMPLETE CBC AUTOMATED: CPT

## 2025-01-28 PROCEDURE — 6370000000 HC RX 637 (ALT 250 FOR IP): Performed by: INTERNAL MEDICINE

## 2025-01-28 PROCEDURE — 6370000000 HC RX 637 (ALT 250 FOR IP)

## 2025-01-28 PROCEDURE — 97535 SELF CARE MNGMENT TRAINING: CPT

## 2025-01-28 PROCEDURE — 36415 COLL VENOUS BLD VENIPUNCTURE: CPT

## 2025-01-28 PROCEDURE — 1100000003 HC PRIVATE W/ TELEMETRY

## 2025-01-28 PROCEDURE — 94761 N-INVAS EAR/PLS OXIMETRY MLT: CPT

## 2025-01-28 PROCEDURE — 94640 AIRWAY INHALATION TREATMENT: CPT

## 2025-01-28 PROCEDURE — 82962 GLUCOSE BLOOD TEST: CPT

## 2025-01-28 PROCEDURE — 97116 GAIT TRAINING THERAPY: CPT

## 2025-01-28 RX ORDER — SEVELAMER CARBONATE FOR ORAL SUSPENSION 2400 MG/1
2.4 POWDER, FOR SUSPENSION ORAL
Status: DISCONTINUED | OUTPATIENT
Start: 2025-01-28 | End: 2025-01-29 | Stop reason: HOSPADM

## 2025-01-28 RX ADMIN — INSULIN LISPRO 4 UNITS: 100 INJECTION, SOLUTION INTRAVENOUS; SUBCUTANEOUS at 11:03

## 2025-01-28 RX ADMIN — FERROUS SULFATE TAB 325 MG (65 MG ELEMENTAL FE) 325 MG: 325 (65 FE) TAB at 18:10

## 2025-01-28 RX ADMIN — BUMETANIDE 1 MG: 1 TABLET ORAL at 21:03

## 2025-01-28 RX ADMIN — SEVELAMER CARBONATE 2.4 G: 2400 FOR SUSPENSION ORAL at 12:36

## 2025-01-28 RX ADMIN — MONTELUKAST 10 MG: 10 TABLET, FILM COATED ORAL at 09:37

## 2025-01-28 RX ADMIN — QUETIAPINE FUMARATE 25 MG: 25 TABLET ORAL at 13:52

## 2025-01-28 RX ADMIN — HEPARIN SODIUM 5000 UNITS: 5000 INJECTION INTRAVENOUS; SUBCUTANEOUS at 21:03

## 2025-01-28 RX ADMIN — MIDODRINE HYDROCHLORIDE 5 MG: 5 TABLET ORAL at 13:52

## 2025-01-28 RX ADMIN — ATORVASTATIN CALCIUM 40 MG: 40 TABLET, FILM COATED ORAL at 09:37

## 2025-01-28 RX ADMIN — QUETIAPINE FUMARATE 25 MG: 25 TABLET ORAL at 21:03

## 2025-01-28 RX ADMIN — ARFORMOTEROL TARTRATE: 15 SOLUTION RESPIRATORY (INHALATION) at 08:38

## 2025-01-28 RX ADMIN — BUMETANIDE 1 MG: 1 TABLET ORAL at 09:37

## 2025-01-28 RX ADMIN — FAMOTIDINE 10 MG: 20 TABLET, FILM COATED ORAL at 09:37

## 2025-01-28 RX ADMIN — INSULIN LISPRO 6 UNITS: 100 INJECTION, SOLUTION INTRAVENOUS; SUBCUTANEOUS at 13:51

## 2025-01-28 RX ADMIN — FERROUS SULFATE TAB 325 MG (65 MG ELEMENTAL FE) 325 MG: 325 (65 FE) TAB at 09:37

## 2025-01-28 RX ADMIN — HEPARIN SODIUM 5000 UNITS: 5000 INJECTION INTRAVENOUS; SUBCUTANEOUS at 13:51

## 2025-01-28 RX ADMIN — INSULIN LISPRO 2 UNITS: 100 INJECTION, SOLUTION INTRAVENOUS; SUBCUTANEOUS at 21:04

## 2025-01-28 RX ADMIN — SEVELAMER CARBONATE 2.4 G: 2400 FOR SUSPENSION ORAL at 18:10

## 2025-01-28 RX ADMIN — SEVELAMER CARBONATE 2.4 G: 2400 FOR SUSPENSION ORAL at 09:38

## 2025-01-28 RX ADMIN — MIDODRINE HYDROCHLORIDE 5 MG: 5 TABLET ORAL at 09:36

## 2025-01-28 RX ADMIN — INSULIN LISPRO 2 UNITS: 100 INJECTION, SOLUTION INTRAVENOUS; SUBCUTANEOUS at 18:10

## 2025-01-28 RX ADMIN — INSULIN GLARGINE 30 UNITS: 100 INJECTION, SOLUTION SUBCUTANEOUS at 11:03

## 2025-01-28 RX ADMIN — PAROXETINE HYDROCHLORIDE 30 MG: 20 TABLET, FILM COATED ORAL at 09:37

## 2025-01-28 RX ADMIN — ASPIRIN 81 MG: 81 TABLET, COATED ORAL at 09:37

## 2025-01-28 RX ADMIN — ARFORMOTEROL TARTRATE: 15 SOLUTION RESPIRATORY (INHALATION) at 21:32

## 2025-01-28 RX ADMIN — CARVEDILOL 12.5 MG: 6.25 TABLET, FILM COATED ORAL at 18:10

## 2025-01-28 RX ADMIN — HEPARIN SODIUM 5000 UNITS: 5000 INJECTION INTRAVENOUS; SUBCUTANEOUS at 06:11

## 2025-01-28 RX ADMIN — QUETIAPINE FUMARATE 25 MG: 25 TABLET ORAL at 09:37

## 2025-01-28 RX ADMIN — FLUTICASONE PROPIONATE 1 SPRAY: 50 SPRAY, METERED NASAL at 01:10

## 2025-01-28 ASSESSMENT — PAIN SCALES - GENERAL
PAINLEVEL_OUTOF10: 0

## 2025-01-28 ASSESSMENT — PAIN SCALES - WONG BAKER: WONGBAKER_NUMERICALRESPONSE: NO HURT

## 2025-01-28 NOTE — CARE COORDINATION
Transition of Care Plan:    RUR:  15%  Prior Level of Functioning: indepednent with ADL's needs assist with IADL's  Disposition: [facility]- auth pending  EVIE: 1/28/2025  If SNF or IPR: Date FOC offered:   Date FOC received: 1/20/2025  Accepting facility: [facility]  Date authorization started with reference  number: [id number] started on 1/27/2025  Date authorization received and expires:   Follow up appointments: PCP/Specialist  DME needed: per rehab  Transportation at discharge: likely BLS  IM/IMM Medicare/ letter given: will need 2nd IMM  Is patient a  and connected with VA?    If yes, was [place] transfer form completed and VA notified?   Caregiver Contact: Madiha Alvarez [phone] daughter  Discharge Caregiver contacted prior to discharge?   Care Conference needed?   Barriers to discharge: auth pending    CM left a VM for admissions at [facility] [phone] [facility] [phone] to attempt to get status of pending authorization - awaiting call back at this time. SHAW HAM, MSW  [phone]    1:15 pm- Spoke with [facility] admissions -still awaiting insurance auth at this time. SHAW HAM, RUBEN  [phone]

## 2025-01-29 VITALS
TEMPERATURE: 98.6 F | BODY MASS INDEX: 43.21 KG/M2 | DIASTOLIC BLOOD PRESSURE: 53 MMHG | SYSTOLIC BLOOD PRESSURE: 130 MMHG | RESPIRATION RATE: 16 BRPM | HEART RATE: 97 BPM | HEIGHT: 64 IN | OXYGEN SATURATION: 96 % | WEIGHT: 253.09 LBS

## 2025-01-29 LAB
ANION GAP SERPL CALC-SCNC: 9 MMOL/L (ref 2–12)
BUN SERPL-MCNC: 57 MG/DL (ref 6–20)
BUN/CREAT SERPL: 9 (ref 12–20)
CALCIUM SERPL-MCNC: 8.8 MG/DL (ref 8.5–10.1)
CHLORIDE SERPL-SCNC: 97 MMOL/L (ref 97–108)
CO2 SERPL-SCNC: 27 MMOL/L (ref 21–32)
CREAT SERPL-MCNC: 6.7 MG/DL (ref 0.55–1.02)
ERYTHROCYTE [DISTWIDTH] IN BLOOD BY AUTOMATED COUNT: 14.9 % (ref 11.5–14.5)
GLUCOSE BLD STRIP.AUTO-MCNC: 147 MG/DL (ref 65–117)
GLUCOSE BLD STRIP.AUTO-MCNC: 231 MG/DL (ref 65–117)
GLUCOSE BLD STRIP.AUTO-MCNC: 242 MG/DL (ref 65–117)
GLUCOSE SERPL-MCNC: 215 MG/DL (ref 65–100)
HCT VFR BLD AUTO: 33 % (ref 35–47)
HGB BLD-MCNC: 10.1 G/DL (ref 11.5–16)
MCH RBC QN AUTO: 28.5 PG (ref 26–34)
MCHC RBC AUTO-ENTMCNC: 30.6 G/DL (ref 30–36.5)
MCV RBC AUTO: 93.2 FL (ref 80–99)
NRBC # BLD: 0 K/UL (ref 0–0.01)
NRBC BLD-RTO: 0 PER 100 WBC
PLATELET # BLD AUTO: 139 K/UL (ref 150–400)
PMV BLD AUTO: 12.2 FL (ref 8.9–12.9)
POTASSIUM SERPL-SCNC: 4.3 MMOL/L (ref 3.5–5.1)
RBC # BLD AUTO: 3.54 M/UL (ref 3.8–5.2)
SERVICE CMNT-IMP: ABNORMAL
SODIUM SERPL-SCNC: 133 MMOL/L (ref 136–145)
WBC # BLD AUTO: 10.2 K/UL (ref 3.6–11)

## 2025-01-29 PROCEDURE — 82962 GLUCOSE BLOOD TEST: CPT

## 2025-01-29 PROCEDURE — 6370000000 HC RX 637 (ALT 250 FOR IP): Performed by: INTERNAL MEDICINE

## 2025-01-29 PROCEDURE — 90935 HEMODIALYSIS ONE EVALUATION: CPT

## 2025-01-29 PROCEDURE — 80048 BASIC METABOLIC PNL TOTAL CA: CPT

## 2025-01-29 PROCEDURE — 6370000000 HC RX 637 (ALT 250 FOR IP): Performed by: STUDENT IN AN ORGANIZED HEALTH CARE EDUCATION/TRAINING PROGRAM

## 2025-01-29 PROCEDURE — 85027 COMPLETE CBC AUTOMATED: CPT

## 2025-01-29 PROCEDURE — 6360000002 HC RX W HCPCS: Performed by: STUDENT IN AN ORGANIZED HEALTH CARE EDUCATION/TRAINING PROGRAM

## 2025-01-29 PROCEDURE — 6370000000 HC RX 637 (ALT 250 FOR IP)

## 2025-01-29 PROCEDURE — 94761 N-INVAS EAR/PLS OXIMETRY MLT: CPT

## 2025-01-29 PROCEDURE — 36415 COLL VENOUS BLD VENIPUNCTURE: CPT

## 2025-01-29 RX ORDER — INSULIN GLARGINE 100 [IU]/ML
30 INJECTION, SOLUTION SUBCUTANEOUS DAILY
Qty: 10 ML | Refills: 3 | Status: SHIPPED
Start: 2025-01-29

## 2025-01-29 RX ORDER — CARVEDILOL 6.25 MG/1
6.25 TABLET ORAL 2 TIMES DAILY WITH MEALS
Qty: 60 TABLET | Refills: 0 | Status: SHIPPED | OUTPATIENT
Start: 2025-01-29

## 2025-01-29 RX ORDER — MIDODRINE HYDROCHLORIDE 5 MG/1
TABLET ORAL
Status: COMPLETED
Start: 2025-01-29 | End: 2025-01-29

## 2025-01-29 RX ORDER — MIDODRINE HYDROCHLORIDE 5 MG/1
5 TABLET ORAL 3 TIMES DAILY
Qty: 90 TABLET | Refills: 0 | Status: SHIPPED | OUTPATIENT
Start: 2025-01-29

## 2025-01-29 RX ORDER — DICYCLOMINE HYDROCHLORIDE 10 MG/1
10 CAPSULE ORAL 4 TIMES DAILY PRN
Qty: 120 CAPSULE | Refills: 3 | Status: SHIPPED | OUTPATIENT
Start: 2025-01-29

## 2025-01-29 RX ADMIN — HEPARIN SODIUM 5000 UNITS: 5000 INJECTION INTRAVENOUS; SUBCUTANEOUS at 06:33

## 2025-01-29 RX ADMIN — FAMOTIDINE 10 MG: 20 TABLET, FILM COATED ORAL at 08:12

## 2025-01-29 RX ADMIN — MIDODRINE HYDROCHLORIDE 5 MG: 5 TABLET ORAL at 10:07

## 2025-01-29 RX ADMIN — BUMETANIDE 1 MG: 1 TABLET ORAL at 08:12

## 2025-01-29 RX ADMIN — MIDODRINE HYDROCHLORIDE 5 MG: 5 TABLET ORAL at 14:29

## 2025-01-29 RX ADMIN — INSULIN GLARGINE 30 UNITS: 100 INJECTION, SOLUTION SUBCUTANEOUS at 14:29

## 2025-01-29 RX ADMIN — QUETIAPINE FUMARATE 25 MG: 25 TABLET ORAL at 14:29

## 2025-01-29 RX ADMIN — CARVEDILOL 12.5 MG: 6.25 TABLET, FILM COATED ORAL at 08:12

## 2025-01-29 RX ADMIN — PAROXETINE HYDROCHLORIDE 30 MG: 20 TABLET, FILM COATED ORAL at 08:12

## 2025-01-29 RX ADMIN — QUETIAPINE FUMARATE 25 MG: 25 TABLET ORAL at 08:12

## 2025-01-29 RX ADMIN — SEVELAMER CARBONATE 2.4 G: 2400 FOR SUSPENSION ORAL at 08:11

## 2025-01-29 RX ADMIN — FERROUS SULFATE TAB 325 MG (65 MG ELEMENTAL FE) 325 MG: 325 (65 FE) TAB at 08:13

## 2025-01-29 RX ADMIN — MONTELUKAST 10 MG: 10 TABLET, FILM COATED ORAL at 08:12

## 2025-01-29 RX ADMIN — HEPARIN SODIUM 5000 UNITS: 5000 INJECTION INTRAVENOUS; SUBCUTANEOUS at 14:28

## 2025-01-29 RX ADMIN — ATORVASTATIN CALCIUM 40 MG: 40 TABLET, FILM COATED ORAL at 08:12

## 2025-01-29 RX ADMIN — ASPIRIN 81 MG: 81 TABLET, COATED ORAL at 08:12

## 2025-01-29 ASSESSMENT — PAIN SCALES - GENERAL: PAINLEVEL_OUTOF10: 0

## 2025-01-29 NOTE — CARE COORDINATION
Transition of Care Plan:     RUR: 16%  Prior Level of Functioning:   [facility]  EVIE:   If SNF or IPR: Date FOC offered:   Date FOC received:   Accepting facility:   Date authorization started with reference number:   Date authorization received and expires:   Follow up appointments: on AVS  DME needed: n/a  Transportation at discharge: [facility]@6:30 - pt needs to be at /main entrance at 6:30 - RN is aware  IM/IMM Medicare/ letter given: 2nd IM given  Is patient a  and connected with VA?               If yes, was  transfer form completed and VA notified?   Caregiver Contact:   Discharge Caregiver contacted prior to discharge?   Care Conference needed?   Barriers to discharge:     CM reached out to SNF for update.   SNF will call CM back after they f/u with insurance.  CM will continue to follow.    12:04  Patient has decided to d/c home w/HH.  Patient has requested a referral be sent to [facility].  CM notified SNF change in d/c.  Patient is expected to d/c later this afternoon after HD.  Patient states her son will be home & will be staying with her for the next couple of weeks.    12:29  [facility] is expecting pt to return on Friday 1/31.    12:38  Patient has a standing order w/[facility] for w/c transport to [facility] for a 11:30 chair time.   Trips will resume Friday 1/31  at approximately 10:50am .  CM confirmed resumption w/[facility]     4:18  [facility] accepted.  [facility] never responded.     4:48  CM scheduled [facility] for 6:30 - pt must be at /main entrance at 6:30.  Patient has her rollator & is comfortable & in agreement w/d/c transportation arrangements      Jennifer Vaughn  Ext [phone]

## 2025-01-29 NOTE — FLOWSHEET NOTE
Pre Dialysis:   01/27/25 0820   Observations & Evaluations   Level of Consciousness 0   Heart Rhythm Regular   Respiratory Quality/Effort Unlabored   O2 Device None (Room air)   Skin Condition/Temp Dry;Warm   Abdomen Inspection Soft   Edema Generalized   Edema Generalized Trace   Vital Signs   BP (!) 162/70   Temp 98.7 °F (37.1 °C)   Pulse 95   Respirations 18   SpO2 92 %   Pain Assessment   Pain Assessment None - Denies Pain   Technical Checks   Dialysis Machine No. 08   RO Machine Number [id number]   Dialyzer Lot No. [id number]   Tubing Lot Number [id number]   All Connections Secure Yes   NS Bag Yes   Saline Line Double Clamped Yes   Dialyzer Nipro ELISIO   Prime Volume (mL) 250 mL   ICEBOAT I;C;E;B;O;A;T   RO Machine Log Sheet Completed Yes   Machine Alarm Self Test Completed;Passed   Air Foam Detector Tested;Proper Function   Extracorporeal Circuit Tested for Integrity Yes   Machine Conductivity 14.1   Manual Ph 7.4   Bleach Test (Neg) Yes   Bath Temperature 98.6 °F (37 °C)   Treatment Initiation   Dialyze Hours 3.5   Treatment  Initiation Universal Precautions maintained;Lines secured to patient;Connections secured;Prime given;Venous Parameters set;Arterial Parameters set;Air foam detector engaged;Saline line double clamped   Hemodialysis Fistula/Graft Arteriovenous fistula Left Arm   Placement Date/Time: 01/15/25 2034   Present on Admission/Arrival: Yes  Access Type: Arteriovenous fistula  Orientation: Left  Access Location: Arm   Site Assessment Clean, dry & intact   Thrill Present   Bruit Present   Status Accessed   Venous Needle Size 15 G   Arterial Needle Size 15 G   Accessed By: SP Kincaid RN   Access Attempts  1   Access Interventions Aseptic Technique;Needles taped to patient   Dialysis Bath   K+ (Potassium) 3   Ca+ (Calcium) 2.5   Na+ (Sodium) 139   HCO3 (Bicarb) 35   Bicarbonate Concentrate Lot No. [id number]   Acid Concentrate Lot No. [id number]     Primary RN SBAR: BLAZE Corona RN  Incapacitated Nurse [facility]. 
Pre Hd note   01/22/25 0900   Treatment   Time On 0900   Treatment Goal 2000   Observations & Evaluations   Level of Consciousness 0   Oriented X 4   Heart Rhythm Regular   Respiratory Quality/Effort Unlabored   O2 Device None (Room air)   Skin Condition/Temp Dry;Warm   Abdomen Inspection Rounded;Soft   Edema None   Vital Signs   BP (!) 146/53   Temp 99.5 °F (37.5 °C)   Pulse 98   Respirations 18   SpO2 98 %   Pain Assessment   Pain Assessment None - Denies Pain   Pain Level 0   Technical Checks   Dialysis Machine No. 09   RO Machine Number [id number]   Dialyzer Lot No. [id number]   Tubing Lot Number [id number]   All Connections Secure Yes   NS Bag Yes   Saline Line Double Clamped Yes   Dialyzer Nipro ELISIO   Prime Volume (mL) 300 mL   ICEBOAT I;C;E;B;O;A;T   RO Machine Log Sheet Completed Yes   Machine Alarm Self Test Completed;Passed   Air Foam Detector Tested;Proper Function;pH Reading   Extracorporeal Circuit Tested for Integrity Yes   Machine Conductivity 13.8   Machine Ph 7.4   Manual Ph 7.4   Bleach Test (Neg) Yes   Bath Temperature 98.2 °F (36.8 °C)   Treatment Initiation   Dialyze Hours 3.5   Treatment  Initiation Universal Precautions maintained;Lines secured to patient;Connections secured;Prime given;Venous Parameters set;Arterial Parameters set;Air foam detector engaged;Dialysate;Saline line double clamped;Hemo-Safe Applied;Dialyzer;Kidney   During Hemodialysis Assessment   Blood Flow Rate (ml/min) 400 ml/min   Arterial Pressure (mmHg) -130 mmHg   Venous Pressure (mmHg) 210   TMP 80      Access Visible Yes   Ultrafiltration Rate (ml/hr) 710 ml/hr   Ultrafiltration Removed (ml) 0 ml   Hemodialysis Fistula/Graft Arteriovenous fistula Left Arm   Placement Date/Time: 01/15/25 2034   Present on Admission/Arrival: Yes  Access Type: Arteriovenous fistula  Orientation: Left  Access Location: Arm   Site Assessment Clean, dry & intact   Thrill Present   Bruit Present   Status Deaccessed   Venous Needle Size 15 G 
Pre hd note   01/24/25 0800   Treatment   Time On 0800   Treatment Goal 2000ml   Observations & Evaluations   Oriented X 4   Heart Rhythm Regular   Respiratory Quality/Effort Unlabored   O2 Device None (Room air)   Skin Condition/Temp Warm;Dry   Abdomen Inspection Rounded;Soft   Edema None   Vital Signs   BP (!) 146/64   Temp 97.3 °F (36.3 °C)   Pulse 95   Respirations 16   SpO2 95 %   Pain Assessment   Pain Assessment None - Denies Pain   Technical Checks   Dialysis Machine No. 04   RO Machine Number [id number]   Dialyzer Lot No. [id number]   Tubing Lot Number [id number]   All Connections Secure Yes   NS Bag Yes   Saline Line Double Clamped Yes   Dialyzer Nipro ELISIO   Prime Volume (mL) 300 mL   ICEBOAT I;C;E;B;O;A;T   RO Machine Log Sheet Completed Yes   Machine Alarm Self Test Completed;Passed   Air Foam Detector Tested;Proper Function;pH Reading   Extracorporeal Circuit Tested for Integrity Yes   Machine Conductivity 13.8   Machine Ph 7.2   Bleach Test (Neg) Yes   Treatment Initiation   Dialyze Hours 3.5   Treatment  Initiation Universal Precautions maintained;Lines secured to patient;Connections secured;Prime given;Venous Parameters set;Arterial Parameters set;Air foam detector engaged;Dialysate;Saline line double clamped;Hemo-Safe Applied;Dialyzer   During Hemodialysis Assessment   Blood Flow Rate (ml/min) 400 ml/min   Arterial Pressure (mmHg) -110 mmHg   Venous Pressure (mmHg) 190   TMP 60      Access Visible Yes   Ultrafiltration Rate (ml/hr) 710 ml/hr   Ultrafiltration Removed (ml) 0 ml   Hemodialysis Fistula/Graft Arteriovenous fistula Left Arm   Placement Date/Time: 01/15/25 2034   Present on Admission/Arrival: Yes  Access Type: Arteriovenous fistula  Orientation: Left  Access Location: Arm   Site Assessment Clean, dry & intact   Thrill Present   Bruit Present   Status Deaccessed   Venous Needle Size 15 G   Arterial Needle Size 15 G   Accessed By: Jaya ACOSTA   Access Attempts  1   Date of Last Dressing Change 
Primary RN SBAR: ASPEN Albright  Incapacitated Nurse [facility]. provided: Yes  Patient Education provided: Graft after treatment care  Preferred Education method and Primary language: Verbal, English  Hospital General Consent Verified: yes  Hospital associated wait time; reason: 120minutes     01/20/25 0920   Observations & Evaluations   Level of Consciousness 0   Oriented X 4   Heart Rhythm Regular   Respiratory Quality/Effort Unlabored   O2 Device None (Room air)   Bilateral Breath Sounds Clear;Diminished   Skin Condition/Temp Dry;Warm   Edema None   Vital Signs   BP (!) 148/58   Temp 98.1 °F (36.7 °C)   Pulse 95   Respirations 16   SpO2 98 %   Pain Assessment   Pain Assessment None - Denies Pain   Technical Checks   Dialysis Machine No. [id number]   RO Machine Number [id number]   Dialyzer Lot No. [id number]   Tubing Lot Number [id number]   All Connections Secure Yes   NS Bag Yes   Saline Line Double Clamped Yes   Dialyzer Nipro ELISIO   Prime Volume (mL) 200 mL   ICEBOAT I;C;E;B;O;A;T   RO Machine Log Sheet Completed Yes   Machine Alarm Self Test Completed;Passed   Air Foam Detector Tested;Proper Function;pH Reading   Extracorporeal Circuit Tested for Integrity Yes   Machine Conductivity 13.8   Manual Ph 7.4   Bleach Test (Neg) Yes   Bath Temperature 98.6 °F (37 °C)   Dialysis Bath   K+ (Potassium) 3   Ca+ (Calcium) 2.5   Na+ (Sodium) 139   HCO3 (Bicarb) 35   Bicarbonate Concentrate Lot No. [id number]   Acid Concentrate Lot No. [id number]       Treatment Start     01/20/25 0925   Treatment   Time On 0925   Vital Signs   BP (!) 157/42   Pulse 93   During Hemodialysis Assessment   Blood Flow Rate (ml/min) 400 ml/min   Arterial Pressure (mmHg) -130 mmHg   Venous Pressure (mmHg) 200   TMP 60      Comments Treatment Start   Access Visible Yes   Ultrafiltration Rate (ml/hr) 710 ml/hr   Ultrafiltration Removed (ml) 0 ml   Hemodialysis Fistula/Graft Arteriovenous fistula Left Arm   Placement Date/Time: 01/15/25 2034   Present 
Primary RN SBAR: Osman Sow RN  Incapacitated Nurse [facility]. provided: yes  Patient Education provided: site rotation on AVG; procedure orders; BP meds given prior to treatment and need for midodrine for any fluid removal  Preferred Education method and Primary language: verbal/ English  Hospital General Consent Verified: yes  Hospital associated wait time; reason: 23min to suite; 32min back to suite  Hepatitis B Surface Ag   Date/Time Value Ref Range Status   01/24/2025 10:06 AM 0.29 Index Final     Hep B S Ag Interp   Date/Time Value Ref Range Status   01/24/2025 10:06 AM Negative NEG   Final     Hep B S Ab   Date/Time Value Ref Range Status   01/24/2025 10:06 AM 23.49 mIU/mL Final     Hep B S Ab Interp   Date/Time Value Ref Range Status   01/24/2025 10:06 AM REACTIVE (A) NR   Final      Pre-HD   01/29/25 0920   Observations & Evaluations   Level of Consciousness 0   Oriented X 4   Heart Rhythm Regular   Respiratory Quality/Effort Unlabored   O2 Device None (Room air)   Bilateral Breath Sounds Diminished   Skin Condition/Temp Warm;Dry;Swollen/edematous   Edema Generalized Trace   RLE Edema Trace   LLE Edema Trace   Vital Signs   BP (!) 115/94   Temp 98.6 °F (37 °C)   Pulse 98   Respirations 16   SpO2 95 %   Pain Assessment   Pain Assessment None - Denies Pain   Technical Checks   Dialysis Machine No. [id number]    Machine Number [id number]   Dialyzer Lot No. [id number]   Tubing Lot Number [id number]   All Connections Secure Yes   NS Bag Yes   Saline Line Double Clamped Yes   Dialyzer Nipro ELISIO   Prime Volume (mL) 200 mL   ICEBOAT I;C;E;B;O;A;T   RO Machine Log Sheet Completed Yes   Machine Alarm Self Test Completed;Passed   Air Foam Detector Tested;Proper Function   Extracorporeal Circuit Tested for Integrity Yes   Machine Conductivity 13.9   Machine Ph 7.4   Bleach Test (Neg) Yes   Bath Temperature 96.8 °F (36 °C)   Dialysis Bath   K+ (Potassium) 3   Ca+ (Calcium) 2.5   Na+ (Sodium) 139   HCO3 (Bicarb) 35     Start 
    Primary RN SBAR: ASPEN Atkins  Incapacitated Nurse [facility]. provided: Yes  Patient Education provided: Access care  Preferred Education method and Primary language: [facility] General Consent Verified: Yes  Hospital associated wait time; reason: 1 hr wait due to transport  CWOW Imm 1/29/24 Neg 12/16/24    ASPEN Stoner completed patient assessment. ASPEN Stoner reviewed vital signs and Hepatitis status interpretation for LPN. Tx completed and reviewed by ASPEN Stoner               Post HD   01/17/25 1742   Vital Signs   BP (!) 102/58   Temp 97.6 °F (36.4 °C)   Pulse 87   Respirations 22   Pain Assessment   Pain Assessment None - Denies Pain   Post-Hemodialysis Assessment   Post-Treatment Procedures Blood returned;Access bleeding time < 10 minutes   Machine Disinfection Process Heat Disinfect;Acid/Vinegar Clean;Exterior Machine Disinfection   Rinseback Volume (ml) 300 ml   Blood Volume Processed (Liters) 77.1 L   Dialyzer Clearance Lightly streaked   Duration of Treatment (minutes) 210 minutes   Heparin Amount Administered During Treatment (mL) 0 mL   Hemodialysis Intake (ml) 500 ml   Hemodialysis Output (ml) 3000 ml   NET Removed (ml) 2500   Tolerated Treatment Good   Patient Response to Treatment tolerated well   Bilateral Breath Sounds Clear   Edema None   Physician Notified No   Time Off 1742   Patient Disposition Return to room   Observations & Evaluations   Level of Consciousness 0   Oriented X 4   Heart Rhythm Regular   Respiratory Quality/Effort Unlabored   O2 Device None (Room air)   Skin Condition/Temp Dry;Warm   Abdomen Inspection Soft;Rounded     Primary RN SBAR: ASPEN Atkins  Comments: Pt tolerated treatment well. No other issues during treatment, all blood returned at the end. +B/T, bleeding less than 10 mins. Pt report given to nurse and pt returned to room.      ASPEN Stoner completed patient assessment. ASPEN Stoner reviewed vital signs and Hepatitis status interpretation for LPN. Tx completed 
(3) adequate

## 2025-01-29 NOTE — DISCHARGE SUMMARY
Discharge Summary    Name: Tiera Rmaon  [id number]  YOB: 1955 (Age: 69 y.o.)   Date of Admission: 1/15/2025  Date of Discharge: 1/29/2025  Attending Physician: Tawny Perez MD    Discharge Diagnosis:   Complicated UTI  Multiple hypodensities in both kidneys  Essential hypertension  Hyperlipidemia  Orthostatic hypotension  Diabetes mellitus  COPD  MDD  UNA  Obesity  Incidental 3mm pulmonary nodule    Consultations:  IP CONSULT TO NEPHROLOGY  IP CONSULT TO PHARMACY  IP CONSULT TO UROLOGY  IP CONSULT TO CASE MANAGEMENT      Brief Admission History/Reason for Admission Per Harish Win, DO:   Tiera Ramon is a 69 y.o.  female with PMHx as listed below presenting to the emergency department with complaints of 1 day right lower abdominal discomfort described as intermittent achy/crampy sharp sensation.  Denies other associated symptoms including nausea/vomiting, hematuria, dysuria, urinary frequency, diarrhea/constipation/flatus.  Denies known sick contacts.  ROS otherwise negative.  Denies tobacco, alcohol, illicit drugs.     In the ED, patient afebrile, tachycardic and 100s, hypertensive 150s/70s, saturations upper 90s in the room air.  EKG demonstrates sinus tachycardia with prolonged QTc of 499.  CT abdomen pelvis demonstrates multiple hypodensities in both kidneys with indeterminate significance (radiologist DDx multiple renal masses, multifocal pyelonephritis, multiple complex cyst) recommended for contrasted MRI for further evaluation.  UA reflexed to culture.  WBC 9.7, healing 10.6, platelets 138, sodium 132, potassium 3.9, glucose 534 (given 10 units IV insulin improved to 150s), BUN 27, creatinine 4.07, LFTs grossly unremarkable, lipase 38.    Brief Hospital Course by Main Problems:   Complicated UTI  -Urine culture showed no growth, was treated with empiric ceftriaxone due to abnormal UA and also suspected pyelonephritis on imaging  No fever

## 2025-01-29 NOTE — PLAN OF CARE
Problem: Occupational Therapy - Adult  Goal: By Discharge: Performs self-care activities at highest level of function for planned discharge setting.  See evaluation for individualized goals.  Description: FUNCTIONAL STATUS PRIOR TO ADMISSION:  can ambulate from one end of apartment to the other on her own with rollator walker, has low vision and needs large print, manages her own medications, uses power chair mostly in her apartment and it fits in all spaces in her apartment, stands with supervision/ assist to bathe in shower or sponge bathes and reports her built in seat is slippery so she has to stand, gets assist with donning sock and shoes at times and other times is able to perform on her own, IADLS are performed for her, goes to dialysis M-W-F and gets medical transport, has 4 sons and son's check on her every day, has a life alert necklace    Receives Help From:  (Pt reports that she \"had an aide, but that person recently quit/fired 2/2 taking her money. Pending replacement of that aide and sons come by to help in the interim\"), Prior Level of Assist for ADLs: Independent,  ,  ,  ,  ,  ,  ,  , Prior Level of Assist for Transfers: Independent,       HOME SUPPORT: Patient lived alone and lost her aid ~1 month ago.  Is in the process of obtaining a new one.  Son's have been assisting pt and check on her daily.    Occupational Therapy Goals:  Initiated 1/18/2025  1.  Patient will perform grooming in bathroom with Supervision within 7 day(s).  2.  Patient will perform lower body dressing with Supervision within 7 day(s).  3.  Patient will perform toileting with Supervision within 7 day(s).  4.  Patient will perform toilet transfers with Supervision  within 7 day(s).      Outcome: Progressing   OCCUPATIONAL THERAPY TREATMENT  Patient: Tiera Ramon (69 y.o. female)  Date: 1/23/2025  Primary Diagnosis: Right lower quadrant abdominal pain [R10.31]  Complicated UTI (urinary tract infection) [N39.0]     
  Problem: Occupational Therapy - Adult  Goal: By Discharge: Performs self-care activities at highest level of function for planned discharge setting.  See evaluation for individualized goals.  Description: FUNCTIONAL STATUS PRIOR TO ADMISSION:  can ambulate from one end of apartment to the other on her own with rollator walker, has low vision and needs large print, manages her own medications, uses power chair mostly in her apartment and it fits in all spaces in her apartment, stands with supervision/ assist to bathe in shower or sponge bathes and reports her built in seat is slippery so she has to stand, gets assist with donning sock and shoes at times and other times is able to perform on her own, IADLS are performed for her, goes to dialysis M-W-F and gets medical transport, has 4 sons and son's check on her every day, has a life alert necklace    Receives Help From:  (Pt reports that she \"had an aide, but that person recently quit/fired 2/2 taking her money. Pending replacement of that aide and sons come by to help in the interim\"), Prior Level of Assist for ADLs: Independent,  ,  ,  ,  ,  ,  ,  , Prior Level of Assist for Transfers: Independent,       HOME SUPPORT: Patient lived alone and lost her aid ~1 month ago.  Is in the process of obtaining a new one.  Son's have been assisting pt and check on her daily.    Occupational Therapy Goals:  Initiated 1/18/2025  1.  Patient will perform grooming in bathroom with Supervision within 7 day(s).  2.  Patient will perform lower body dressing with Supervision within 7 day(s).  3.  Patient will perform toileting with Supervision within 7 day(s).  4.  Patient will perform toilet transfers with Supervision  within 7 day(s).      Outcome: Progressing  OCCUPATIONAL THERAPY TREATMENT  Patient: Tiera Ramon (69 y.o. female)  Date: 1/21/2025  Primary Diagnosis: Right lower quadrant abdominal pain [R10.31]  Complicated UTI (urinary tract infection) [N39.0]     
  Problem: Physical Therapy - Adult  Goal: By Discharge: Performs mobility at highest level of function for planned discharge setting.  See evaluation for individualized goals.  Description: FUNCTIONAL STATUS PRIOR TO ADMISSION: Pt is independent and reports primary mobilization via RW or electric wheelchair. Pt resides alone in a 4th floor senior living apt with level entry and elevator access. Pt reports that she previously had an aide, \"but that person quit or was fired after stealing her money. Pt's sons in the process of trying to secure replacement home health aide and in the interim have been providing ?daily? assist to help Patient, prn. PMHx significant for ESRD on HD (MWF), MDD, LUE fistula, pulmonary htn, RLQ abdominal pain. Pt is referred for therapy services per resulting functional mobility decline.    HOME SUPPORT PRIOR TO ADMISSION:     Physical Therapy Goals  Initiated 1/18/2025  1.  Patient will move from supine to sit and sit to supine in bed with supervision/set-up within 7 day(s).    2.  Patient will perform sit to stand with supervision/set-up within 7 day(s).  3.  Patient will transfer from bed to chair and chair to bed with supervision/set-up using the least restrictive device within 7 day(s).  4.  Patient will ambulate with supervision/set-up for 200 feet with the least restrictive device within 7 day(s).   Outcome: Progressing     PHYSICAL THERAPY EVALUATION    Patient: Tiera Ramon (69 y.o. female)  Date: 1/18/2025  Primary Diagnosis: Right lower quadrant abdominal pain [R10.31]  Complicated UTI (urinary tract infection) [N39.0]       Precautions: Restrictions/Precautions:  (falls, ESRD on HD (MWF), LUE fistula, pulm htn, MDD)                      ASSESSMENT :   DEFICITS/IMPAIRMENTS:     Pt tolerated PT services well. Pt received in bed and amenable to therapy services. Pt is indep/Bandar and primarily ambulatory via RW or electric w/c. Pt resides alone in a 4th floor senior living apt 
  Problem: Physical Therapy - Adult  Goal: By Discharge: Performs mobility at highest level of function for planned discharge setting.  See evaluation for individualized goals.  Description: FUNCTIONAL STATUS PRIOR TO ADMISSION: Pt is independent and reports primary mobilization via RW or electric wheelchair. Pt resides alone in a 4th floor senior living apt with level entry and elevator access. Pt reports that she previously had an aide, \"but that person quit or was fired after stealing her money. Pt's sons in the process of trying to secure replacement home health aide and in the interim have been providing ?daily? assist to help Patient, prn. PMHx significant for ESRD on HD (MWF), MDD, LUE fistula, pulmonary htn, RLQ abdominal pain. Pt is referred for therapy services per resulting functional mobility decline.    HOME SUPPORT PRIOR TO ADMISSION:     Physical Therapy Goals  Initiated 1/18/2025  1.  Patient will move from supine to sit and sit to supine in bed with supervision/set-up within 7 day(s).    2.  Patient will perform sit to stand with supervision/set-up within 7 day(s).  3.  Patient will transfer from bed to chair and chair to bed with supervision/set-up using the least restrictive device within 7 day(s).  4.  Patient will ambulate with supervision/set-up for 200 feet with the least restrictive device within 7 day(s).   Outcome: Progressing   PHYSICAL THERAPY TREATMENT    Patient: Tiera Ramon (69 y.o. female)  Date: 1/21/2025  Diagnosis: Right lower quadrant abdominal pain [R10.31]  Complicated UTI (urinary tract infection) [N39.0] Complicated UTI (urinary tract infection)      Precautions:  (falls, ESRD on HD (MWF), LUE fistula, pulm htn, MDD)                      ASSESSMENT:  Patient continues to benefit from skilled PT services and is slowly progressing towards goals. Patient in bed upon arrival and agreeable to therapy. BP soft and mild orthostatic hypotension noted but patient remained 
  Problem: Physical Therapy - Adult  Goal: By Discharge: Performs mobility at highest level of function for planned discharge setting.  See evaluation for individualized goals.  Description: FUNCTIONAL STATUS PRIOR TO ADMISSION: Pt is independent and reports primary mobilization via RW or electric wheelchair. Pt resides alone in a 4th floor senior living apt with level entry and elevator access. Pt reports that she previously had an aide, \"but that person quit or was fired after stealing her money. Pt's sons in the process of trying to secure replacement home health aide and in the interim have been providing ?daily? assist to help Patient, prn. PMHx significant for ESRD on HD (MWF), MDD, LUE fistula, pulmonary htn, RLQ abdominal pain. Pt is referred for therapy services per resulting functional mobility decline.    HOME SUPPORT PRIOR TO ADMISSION:     Physical Therapy Goals  Initiated 1/18/2025  1.  Patient will move from supine to sit and sit to supine in bed with supervision/set-up within 7 day(s).    2.  Patient will perform sit to stand with supervision/set-up within 7 day(s).  3.  Patient will transfer from bed to chair and chair to bed with supervision/set-up using the least restrictive device within 7 day(s).  4.  Patient will ambulate with supervision/set-up for 200 feet with the least restrictive device within 7 day(s).   Outcome: Progressing   PHYSICAL THERAPY TREATMENT    Patient: Tiera Ramon (69 y.o. female)  Date: 1/23/2025  Diagnosis: Right lower quadrant abdominal pain [R10.31]  Complicated UTI (urinary tract infection) [N39.0] Complicated UTI (urinary tract infection)      Precautions:  (falls, ESRD on HD (MWF), LUE fistula, pulm htn, MDD)                      ASSESSMENT:  Patient continues to benefit from skilled PT services and is progressing towards goals. Patient agreeable to therapy and eager to ambulate. She requires additional time for all mobility tasks. Patient able to complete bed 
  Problem: Physical Therapy - Adult  Goal: By Discharge: Performs mobility at highest level of function for planned discharge setting.  See evaluation for individualized goals.  Description: FUNCTIONAL STATUS PRIOR TO ADMISSION: Pt is independent and reports primary mobilization via RW or electric wheelchair. Pt resides alone in a 4th floor senior living apt with level entry and elevator access. Pt reports that she previously had an aide, \"but that person quit or was fired after stealing her money. Pt's sons in the process of trying to secure replacement home health aide and in the interim have been providing ?daily? assist to help Patient, prn. PMHx significant for ESRD on HD (MWF), MDD, LUE fistula, pulmonary htn, RLQ abdominal pain. Pt is referred for therapy services per resulting functional mobility decline.    HOME SUPPORT PRIOR TO ADMISSION:     Physical Therapy Goals  Re-assess 1/27/25  1.  Patient will move from supine to sit and sit to supine in bed with supervision/set-up within 7 day(s).    2.  Patient will perform sit to stand with supervision/set-up within 7 day(s).  3.  Patient will transfer from bed to chair and chair to bed with supervision/set-up using the least restrictive device within 7 day(s).  4.  Patient will ambulate with supervision/set-up for 100 feet with the least restrictive device within 7 day(s).     Initiated 1/18/2025  1.  Patient will move from supine to sit and sit to supine in bed with supervision/set-up within 7 day(s).    2.  Patient will perform sit to stand with supervision/set-up within 7 day(s).  3.  Patient will transfer from bed to chair and chair to bed with supervision/set-up using the least restrictive device within 7 day(s).  4.  Patient will ambulate with supervision/set-up for 200 feet with the least restrictive device within 7 day(s).   1/28/2025 1356 by Wanda Singh, PT  Outcome: Progressing   PHYSICAL THERAPY TREATMENT    Patient: Tiera Ramon (69 y.o. 
  Problem: Respiratory - Adult  Goal: Achieves optimal ventilation and oxygenation  1/19/2025 2120 by Irena Zepeda RCP  Outcome: Progressing  1/19/2025 0726 by Summer Benites RCP  Outcome: Progressing     
  Problem: Respiratory - Adult  Goal: Achieves optimal ventilation and oxygenation  1/20/2025 2114 by Irena Zepeda RCP  Outcome: Progressing  1/20/2025 0848 by Summer Benites RCP  Outcome: Progressing  Flowsheets (Taken 1/20/2025 0754 by Joie Heard LPN)  Achieves optimal ventilation and oxygenation: Assess for changes in respiratory status     
  Problem: Respiratory - Adult  Goal: Achieves optimal ventilation and oxygenation  1/21/2025 1936 by Irena Zepeda RCP  Outcome: Progressing  1/21/2025 0757 by Summer Benites RCP  Outcome: Progressing     
  Problem: Respiratory - Adult  Goal: Achieves optimal ventilation and oxygenation  Outcome: Progressing     
  Problem: Safety - Adult  Goal: Free from fall injury  1/22/2025 0912 by Samuel Wilkes RN  Outcome: Progressing  1/22/2025 0155 by Luisa Lucero RN  Outcome: Progressing     Problem: Chronic Conditions and Co-morbidities  Goal: Patient's chronic conditions and co-morbidity symptoms are monitored and maintained or improved  1/22/2025 0912 by Samuel Wilkes RN  Outcome: Progressing  1/22/2025 0155 by Luisa Lucero RN  Outcome: Progressing     Problem: Discharge Planning  Goal: Discharge to home or other facility with appropriate resources  1/22/2025 0912 by Samuel Wilkes RN  Outcome: Progressing  1/22/2025 0155 by Luisa Lucero RN  Outcome: Progressing     Problem: Respiratory - Adult  Goal: Achieves optimal ventilation and oxygenation  1/22/2025 0912 by Samuel Wilkes RN  Outcome: Progressing  1/22/2025 0819 by Igor Severino RCP  Outcome: Progressing  1/22/2025 0155 by Luisa Lucero RN  Outcome: Progressing  1/21/2025 1936 by Irena Zepeda RCP  Outcome: Progressing     
  Problem: Safety - Adult  Goal: Free from fall injury  1/23/2025 0813 by Samuel Wilkes RN  Outcome: Progressing  1/22/2025 1854 by Samuel Wilkes RN  Outcome: Progressing     Problem: Chronic Conditions and Co-morbidities  Goal: Patient's chronic conditions and co-morbidity symptoms are monitored and maintained or improved  1/23/2025 0813 by Samuel Wilkes RN  Outcome: Progressing  1/22/2025 1854 by Samuel Wilkes RN  Outcome: Progressing     Problem: Discharge Planning  Goal: Discharge to home or other facility with appropriate resources  1/23/2025 0813 by Samuel Wilkes RN  Outcome: Progressing  1/22/2025 1854 by Samuel Wilkes RN  Outcome: Progressing     Problem: Respiratory - Adult  Goal: Achieves optimal ventilation and oxygenation  1/23/2025 0813 by Samuel Wilkes RN  Outcome: Progressing  1/23/2025 0809 by Zaira Ag, RT  Outcome: Progressing  1/22/2025 2133 by Deejay Jauregui RCP  Outcome: Progressing     
  Problem: Safety - Adult  Goal: Free from fall injury  1/28/2025 1159 by Osman Sow RN  Outcome: Progressing  1/28/2025 0158 by Hafsa Cadena LPN  Outcome: Progressing     Problem: Chronic Conditions and Co-morbidities  Goal: Patient's chronic conditions and co-morbidity symptoms are monitored and maintained or improved  Outcome: Progressing     Problem: Discharge Planning  Goal: Discharge to home or other facility with appropriate resources  Outcome: Progressing     Problem: Respiratory - Adult  Goal: Achieves optimal ventilation and oxygenation  1/28/2025 1159 by Osman Sow RN  Outcome: Progressing  1/28/2025 0854 by Zaira Ag, RT  Outcome: Progressing  1/28/2025 0158 by Hafsa Cadena LPN  Outcome: Progressing     Problem: Pain  Goal: Verbalizes/displays adequate comfort level or baseline comfort level  1/28/2025 1159 by Osman Sow RN  Outcome: Progressing  1/28/2025 0158 by Hafsa Cadena LPN  Outcome: Progressing     Problem: Skin/Tissue Integrity  Goal: Absence of new skin breakdown  Description: 1.  Monitor for areas of redness and/or skin breakdown  2.  Assess vascular access sites hourly  3.  Every 4-6 hours minimum:  Change oxygen saturation probe site  4.  Every 4-6 hours:  If on nasal continuous positive airway pressure, respiratory therapy assess nares and determine need for appliance change or resting period.  1/28/2025 1159 by Osman Sow RN  Outcome: Progressing  1/28/2025 0158 by Hafsa Cadena LPN  Outcome: Progressing     
  Problem: Safety - Adult  Goal: Free from fall injury  1/29/2025 1208 by Osman Sow RN  Outcome: Progressing  1/29/2025 0146 by Nell Landaverde RN  Outcome: Progressing     Problem: Chronic Conditions and Co-morbidities  Goal: Patient's chronic conditions and co-morbidity symptoms are monitored and maintained or improved  1/29/2025 1208 by Osman Sow RN  Outcome: Progressing  1/29/2025 0146 by Nell Landaverde RN  Outcome: Progressing     Problem: Discharge Planning  Goal: Discharge to home or other facility with appropriate resources  1/29/2025 1208 by Osman Sow RN  Outcome: Progressing  1/29/2025 0146 by Nell Landaverde RN  Outcome: Progressing     Problem: Respiratory - Adult  Goal: Achieves optimal ventilation and oxygenation  1/29/2025 1208 by Osman Sow RN  Outcome: Progressing  1/29/2025 0146 by Nell Landaverde RN  Outcome: Progressing     Problem: Pain  Goal: Verbalizes/displays adequate comfort level or baseline comfort level  1/29/2025 1208 by Osman Sow RN  Outcome: Progressing  1/29/2025 0146 by Nell Landaverde RN  Outcome: Progressing     Problem: Skin/Tissue Integrity  Goal: Absence of new skin breakdown  Description: 1.  Monitor for areas of redness and/or skin breakdown  2.  Assess vascular access sites hourly  3.  Every 4-6 hours minimum:  Change oxygen saturation probe site  4.  Every 4-6 hours:  If on nasal continuous positive airway pressure, respiratory therapy assess nares and determine need for appliance change or resting period.  1/29/2025 1208 by Osman Sow RN  Outcome: Progressing  1/29/2025 0146 by Nell Landaverde RN  Outcome: Progressing     
  Problem: Safety - Adult  Goal: Free from fall injury  Outcome: Progressing     
  Problem: Safety - Adult  Goal: Free from fall injury  Outcome: Progressing     Problem: Chronic Conditions and Co-morbidities  Goal: Patient's chronic conditions and co-morbidity symptoms are monitored and maintained or improved  Outcome: Progressing     Problem: Discharge Planning  Goal: Discharge to home or other facility with appropriate resources  Outcome: Progressing     Problem: Respiratory - Adult  Goal: Achieves optimal ventilation and oxygenation  1/22/2025 0155 by Luisa Lucero RN  Outcome: Progressing  1/21/2025 1936 by Irena Zepeda RCP  Outcome: Progressing     Problem: Physical Therapy - Adult  Goal: By Discharge: Performs mobility at highest level of function for planned discharge setting.  See evaluation for individualized goals.  Description: FUNCTIONAL STATUS PRIOR TO ADMISSION: Pt is independent and reports primary mobilization via RW or electric wheelchair. Pt resides alone in a 4th floor senior living apt with level entry and elevator access. Pt reports that she previously had an aide, \"but that person quit or was fired after stealing her money. Pt's sons in the process of trying to secure replacement home health aide and in the interim have been providing ?daily? assist to help Patient, prn. PMHx significant for ESRD on HD (MWF), MDD, LUE fistula, pulmonary htn, RLQ abdominal pain. Pt is referred for therapy services per resulting functional mobility decline.    HOME SUPPORT PRIOR TO ADMISSION:     Physical Therapy Goals  Initiated 1/18/2025  1.  Patient will move from supine to sit and sit to supine in bed with supervision/set-up within 7 day(s).    2.  Patient will perform sit to stand with supervision/set-up within 7 day(s).  3.  Patient will transfer from bed to chair and chair to bed with supervision/set-up using the least restrictive device within 7 day(s).  4.  Patient will ambulate with supervision/set-up for 200 feet with the least restrictive device within 7 day(s). 
Problem: Occupational Therapy - Adult  Goal: By Discharge: Performs self-care activities at highest level of function for planned discharge setting.  See evaluation for individualized goals.  Description: FUNCTIONAL STATUS PRIOR TO ADMISSION:  can ambulate from one end of apartment to the other on her own with rollator walker, has low vision and needs large print, manages her own medications, uses power chair mostly in her apartment and it fits in all spaces in her apartment, stands with supervision/ assist to bathe in shower or sponge bathes and reports her built in seat is slippery so she has to stand, gets assist with donning sock and shoes at times and other times is able to perform on her own, IADLS are performed for her, goes to dialysis M-W-F and gets medical transport, has 4 sons and son's check on her every day, has a life alert necklace    Receives Help From:  (Pt reports that she \"had an aide, but that person recently quit/fired 2/2 taking her money. Pending replacement of that aide and sons come by to help in the interim\"), Prior Level of Assist for ADLs: Independent,  ,  ,  ,  ,  ,  ,  , Prior Level of Assist for Transfers: Independent,       HOME SUPPORT: Patient lived alone and lost her aid ~1 month ago.  Is in the process of obtaining a new one.  Son's have been assisting pt and check on her daily.    Occupational Therapy Goals:  Initiated 1/18/2025  1.  Patient will perform grooming in bathroom with Supervision within 7 day(s).  2.  Patient will perform lower body dressing with Supervision within 7 day(s).  3.  Patient will perform toileting with Supervision within 7 day(s).  4.  Patient will perform toilet transfers with Supervision  within 7 day(s).      Outcome: Not Progressing  OCCUPATIONAL THERAPY EVALUATION    Patient: Tiera Ramon (69 y.o. female)  Date: 1/18/2025  Primary Diagnosis: Right lower quadrant abdominal pain [R10.31]  Complicated UTI (urinary tract infection) [N39.0]     
within 7 day(s).  3.  Patient will transfer from bed to chair and chair to bed with supervision/set-up using the least restrictive device within 7 day(s).  4.  Patient will ambulate with supervision/set-up for 100 feet with the least restrictive device within 7 day(s).     Initiated 1/18/2025  1.  Patient will move from supine to sit and sit to supine in bed with supervision/set-up within 7 day(s).    2.  Patient will perform sit to stand with supervision/set-up within 7 day(s).  3.  Patient will transfer from bed to chair and chair to bed with supervision/set-up using the least restrictive device within 7 day(s).  4.  Patient will ambulate with supervision/set-up for 200 feet with the least restrictive device within 7 day(s).   1/28/2025 1357 by Wanda Singh, PT  Outcome: Progressing  1/28/2025 1356 by Wanda Singh, PT  Outcome: Progressing     Problem: Occupational Therapy - Adult  Goal: By Discharge: Performs self-care activities at highest level of function for planned discharge setting.  See evaluation for individualized goals.  Description: FUNCTIONAL STATUS PRIOR TO ADMISSION:  can ambulate from one end of apartment to the other on her own with rollator walker, has low vision and needs large print, manages her own medications, uses power chair mostly in her apartment and it fits in all spaces in her apartment, stands with supervision/ assist to bathe in shower or sponge bathes and reports her built in seat is slippery so she has to stand, gets assist with donning sock and shoes at times and other times is able to perform on her own, IADLS are performed for her, goes to dialysis M-W-F and gets medical transport, has 4 sons and son's check on her every day, has a life alert necklace    Receives Help From:  (Pt reports that she \"had an aide, but that person recently quit/fired 2/2 taking her money. Pending replacement of that aide and sons come by to help in the interim\"), Prior Level of Assist for ADLs: 
morning so I feel much better.”    OBJECTIVE DATA SUMMARY:   Cognitive/Behavioral Status:  Orientation  Overall Orientation Status: Within Normal Limits  Cognition  Overall Cognitive Status: WNL    Functional Mobility and Transfers for ADLs:  Bed Mobility:  Bed Mobility Training  Bed Mobility Training: No     Transfers:   Transfer Training  Transfer Training: Yes  Sit to Stand: Contact-guard assistance;Additional time;Assist X1  Stand to Sit: Contact-guard assistance;Additional time;Assist X1  Toilet Transfer: Contact-guard assistance;Additional time;Adaptive equipment           Balance:     Balance  Sitting: Intact  Standing: Impaired;With support  Standing - Static: Good;Occasional  Standing - Dynamic: Good;Occasional      ADL Intervention:                   Grooming: Contact guard assistance   Grooming Skilled Clinical Factors: standing at sink to wash hands                                    Toileting: Contact guard assistance;Adaptive equipment;Increased time to complete                            Pain Ratin/10   Pain Intervention(s):   pain is at a level acceptable to the patient      Activity Tolerance:   Good  Please refer to the flowsheet for vital signs taken during this treatment.    After treatment:   Ambulating in hallway with PT    COMMUNICATION/EDUCATION:   The patient's plan of care was discussed with: physical therapist, registered nurse, and     Patient Education  Education Given To: Patient  Education Provided: Role of Therapy;Transfer Training;Equipment;Plan of Care;Energy Conservation;Fall Prevention Strategies;Precautions;Mobility Training;ADL Adaptive Strategies  Education Method: Demonstration;Verbal;Teach Back  Barriers to Learning: None  Education Outcome: Verbalized understanding;Demonstrated understanding;Continued education needed    Thank you for this referral.  YOHANA BECKWITH, OT  Minutes: 23    
progression    Recommendation for discharge: (in order for the patient to meet his/her long term goals):   Moderate intensity short-term skilled occupational therapy up to 5x/week    Other factors to consider for discharge: lives alone, high risk for falls, not safe to be alone, and concern for safely navigating or managing the home environment    IF patient discharges home will need the following DME: continuing to assess with progress     SUBJECTIVE:   Patient stated “I am dizzy.” (in standing, unable to obtain BP - pt c/o dizziness and increased drowsiness)    OBJECTIVE DATA SUMMARY:   Cognitive/Behavioral Status:  Orientation  Overall Orientation Status: Within Normal Limits  Orientation Level: Oriented X4  Cognition  Overall Cognitive Status: WNL    Functional Mobility and Transfers for ADLs:  Bed Mobility:  Bed Mobility Training  Bed Mobility Training: Yes  Supine to Sit: Supervision  Scooting: Supervision     Transfers:   Transfer Training  Transfer Training: Yes  Interventions: Verbal cues;Tactile cues;Safety awareness training;Manual cues;Demonstration  Sit to Stand: Minimum assistance;Assist X2;Adaptive equipment;Additional time  Stand to Sit: Contact-guard assistance;Additional time;Adaptive equipment;Assist X2  Bed to Chair: Minimum assistance;Contact-guard assistance;Additional time;Adaptive equipment;Assist X2    Balance:     Balance  Sitting: Impaired  Sitting - Static: Good (unsupported)  Sitting - Dynamic: Good (unsupported)  Standing: Impaired;With support  Standing - Static: Fair;Constant support  Standing - Dynamic: Fair;Constant support    ADL Intervention:                 Grooming: Setup  Grooming Skilled Clinical Factors: seated                    UE Dressing: Setup  UE Dressing Skilled Clinical Factors: seated    LE Dressing: Supervision  LE Dressing Skilled Clinical Factors: to don L sock tailor sitting EOB         Pain Ratin/10 chronic RLE pain   Pain Intervention(s):   pain is at a 
sitting on the side of a flat bed without using bedrails?: A Little  How much help is needed moving to and from a bed to a chair?: A Little  How much help is needed standing up from a chair using your arms?: A Little  How much help is needed walking in hospital room?: A Little  How much help is needed climbing 3-5 steps with a railing?: A Lot    AM-PAC Inpatient Mobility Raw Score : 17  AM-PAC Inpatient T-Scale Score : 42.13     Cutoff score <=171,2,3 had higher odds of discharging home with home health or need of SNF/IPR.    1. Dalia Law, Rosa Khan, Randell Weems, Juana Jorgensen, Karthik Montenegro, Axel Law.  Validity of the AM-PAC “6-Clicks” Inpatient Daily Activity and Basic Mobility Short Forms. Physical Therapy Mar 2014, [phone]; DOI: 10.2522/ptj.[id number]  2. Nick BECKWITH, Carin PENA, Claire PENA, Josemanuel J. Association of AM-PAC \"6-Clicks\" Basic Mobility and Daily Activity Scores With Discharge Destination. Phys Ther. 2021 Apr 4;101(4):biwl919. doi: 10.1093/ptj/oljc582. PMID: 64761001.  3. Martine PENA, Talha D, Katelyn S, Ambika K, Rambo S. Activity Measure for Post-Acute Care \"6-Clicks\" Basic Mobility Scores Predict Discharge Destination After Acute Care Hospitalization in Select Patient Groups: A Retrospective, Observational Study. Arch Rehabil Res Clin Transl. 2022 Jul 16;4(3):792066. doi: [email or web address]/j.arrct.2022.068864. PMID: 18034705; PMCID: [id number].  4. Thanh SMITH, Luz S, Nilo W, Debbi MAE. AM-PAC Short Forms Manual 4.0. Revised 2/2020.                                                                                                                                                                                                                               Pain Rating:  R knee pain chronic, did not significantly impact session    Pain Intervention(s):   Seated exercises with improvement in pain     Activity Tolerance:   Fair  and signs and symptoms of orthostatic hypotension that

## 2025-01-30 NOTE — PROGRESS NOTES
Name: Tiera Ramon   MRN: [id number]  : 1955    Assessment   :                                               Plan:  ESKD  L AVF  Mild hyponatremia  UTI  DM  Obesity class 3 (BMI > 40)  HTN-not on meds. Takes midodrine   Anemia of eskd  Renal masses- complex cysts vs pyelo vs tumor.  MWF HD at [facility] ([facility]). HD today  Using L AVF, 400 QB, 2k/2.5 Ca  UF of 2.2 Kg. Tolerating well. /41 at time of visit    D/C oral iron- not needed. Gets IV iron with HD    On midodrine (not on BP meds)     OP Urology f/u     Hgb > 10, no josselyn for now     D/W pt, HD RN       Subjective:  Seen on HD at 11:20 am. Tolerating well. No acute c/o    ROS:   No nausea, no vomiting  No chest pain, no shortness of breath    Exam:  BP (!) 131/52   Pulse 100   Temp 98.1 °F (36.7 °C)   Resp 16   Ht 1.626 m (5' 4\")   Wt 114.8 kg (253 lb 1.4 oz)   SpO2 98%   BMI 43.44 kg/m²     NAD  Clear  RRR, no rub  L AVF patent  No edema  AOx3    Labs/Data:    Lab Results   Component Value Date    WBC 12.3 (H) 2025    HGB 10.4 (L) 2025    HCT 33.6 (L) 2025    MCV 90.3 2025     2025       Lab Results   Component Value Date/Time     2025 10:02 AM    K 4.8 2025 10:02 AM    CL 96 2025 10:02 AM    CO2 26 2025 10:02 AM    BUN 55 2025 10:02 AM    CREATININE 7.66 2025 10:02 AM    GLUCOSE 115 2025 10:02 AM    CALCIUM 8.8 2025 10:02 AM    LABGLOM 5 2025 10:02 AM    LABGLOM 6 2023 10:11 AM        Wt Readings from Last 3 Encounters:   25 114.8 kg (253 lb 1.4 oz)   24 112 kg (246 lb 14.6 oz)       No intake or output data in the 24 hours ending 25 1145    Patient seen and examined. Chart reviewed. Labs, data and other pertinent notes reviewed in last 24 hrs.    PMH/SH/FH 
                                                                                                                                              Name: Tiera Ramon   MRN: [id number]  : 1955    Assessment   :                                               Plan:  ESKD  L AVF  Mild hyponatremia  UTI  DM  Obesity class 3 (BMI > 40)  HTN-not on meds. Takes midodrine   Anemia of eskd  Renal masses- complex cysts vs pyelo vs tumor.  MWF HD at [facility] ([facility] [place]     No acute need for HD today.  Plan HD in AM.    On midodrine (not on BP meds)     OP Urology f/u     Hgb > 10, no josselyn for now             Subjective:      No complaint.    Exam:  BP (!) 125/43   Pulse 85   Temp 97.9 °F (36.6 °C)   Resp 18   Ht 1.626 m (5' 4.02\")   Wt 114.8 kg (253 lb 1.4 oz)   SpO2 95%   BMI 43.42 kg/m²     NAD  Clear  RRR, distant  No edema  AOx3    Labs/Data:    Lab Results   Component Value Date    WBC 9.8 2025    HGB 10.6 (L) 2025    HCT 35.0 2025    MCV 90.9 2025     (L) 2025       Lab Results   Component Value Date/Time     2025 05:03 AM    K 4.5 2025 05:03 AM     2025 05:03 AM    CO2 27 2025 05:03 AM    BUN 41 2025 05:03 AM    CREATININE 5.30 2025 05:03 AM    GLUCOSE 257 2025 05:03 AM    CALCIUM 9.0 2025 05:03 AM    LABGLOM 8 2025 05:03 AM    LABGLOM 6 2023 10:11 AM        Wt Readings from Last 3 Encounters:   25 114.8 kg (253 lb 1.4 oz)   24 112 kg (246 lb 14.6 oz)         Intake/Output Summary (Last 24 hours) at 2025 1050  Last data filed at 2025 0402  Gross per 24 hour   Intake 1337 ml   Output 1800 ml   Net -463 ml           Patient seen and examined. Chart reviewed. Labs, data and other pertinent notes reviewed in last 24 hrs.    PMH/SH/FH reviewed and unchanged compared to H&P    Jermaine Nelson MD             
                                                                                                                                              Name: Tiera Ramon   MRN: [id number]  : 1955    Assessment   :                                               Plan:  ESKD  L AVF  Mild hyponatremia  UTI  DM  Obesity class 3 (BMI > 40)  HTN-not on meds. Takes midodrine   Anemia of eskd  Renal masses- complex cysts vs pyelo vs tumor.  MWF HD at [facility] ([facility]).     Seen on HD at 0930.    On midodrine      OP Urology f/u     Hgb > 10, no josselyn for now             Subjective:      No complaint.    Exam:  BP (!) 157/51   Pulse 95   Temp 98.4 °F (36.9 °C)   Resp 15   Ht 1.626 m (5' 4.02\")   Wt 114.8 kg (253 lb 1.4 oz)   SpO2 95%   BMI 43.42 kg/m²     NAD  Clear  RRR, distant  No edema  AOx3    Labs/Data:    Lab Results   Component Value Date    WBC 10.2 2025    HGB 10.1 (L) 2025    HCT 33.0 (L) 2025    MCV 93.2 2025     (L) 2025       Lab Results   Component Value Date/Time     2025 05:55 AM    K 4.3 2025 05:55 AM    CL 97 2025 05:55 AM    CO2 27 2025 05:55 AM    BUN 57 2025 05:55 AM    CREATININE 6.70 2025 05:55 AM    GLUCOSE 215 2025 05:55 AM    CALCIUM 8.8 2025 05:55 AM    LABGLOM 6 2025 05:55 AM    LABGLOM 6 2023 10:11 AM        Wt Readings from Last 3 Encounters:   25 114.8 kg (253 lb 1.4 oz)   24 112 kg (246 lb 14.6 oz)       No intake or output data in the 24 hours ending 25 1038          Patient seen and examined. Chart reviewed. Labs, data and other pertinent notes reviewed in last 24 hrs.    PMH/SH/FH reviewed and unchanged compared to H&P    Jermaine Nelson MD             
                                                                                                                                              Name: Tiera Ramon   MRN: [id number]  : 1955    Assessment   :                                               Plan:  ESKD  L AVF  Mild hyponatremia  UTI  DM  Obesity class 3 (BMI > 40)  HTN-not on meds. Takes midodrine   Anemia of eskd  Renal masses- complex cysts vs pyelo vs tumor.  MWF HD at [facility] ([facility]).     Seen on HD today-using left AV fistula, 2K/2.5 calcium, UF of 2 kg. /44 during visit. 400 QB/600 QD    Oral iron stopped- not needed. Gets IV iron with HD    On midodrine (not on BP meds)     OP Urology f/u     Hgb > 10, no josselyn for now     D/W pt, HD RN       Subjective:  Seen on HD at 10:10 AM.  Tolerating well.  No acute complaints    ROS:   No nausea, no vomiting  No chest pain, no shortness of breath    Exam:  BP (!) 119/35   Pulse 99   Temp 99.5 °F (37.5 °C)   Resp 18   Ht 1.626 m (5' 4\")   Wt 114.8 kg (253 lb 1.4 oz)   SpO2 98%   BMI 43.44 kg/m²     NAD  L AVF patent  No edema  AOx3    Labs/Data:    Lab Results   Component Value Date    WBC 12.3 (H) 2025    HGB 10.4 (L) 2025    HCT 33.6 (L) 2025    MCV 90.3 2025     2025       Lab Results   Component Value Date/Time     2025 10:02 AM    K 4.8 2025 10:02 AM    CL 96 2025 10:02 AM    CO2 26 2025 10:02 AM    BUN 55 2025 10:02 AM    CREATININE 7.66 2025 10:02 AM    GLUCOSE 115 2025 10:02 AM    CALCIUM 8.8 2025 10:02 AM    LABGLOM 5 2025 10:02 AM    LABGLOM 6 2023 10:11 AM        Wt Readings from Last 3 Encounters:   25 114.8 kg (253 lb 1.4 oz)   24 112 kg (246 lb 14.6 oz)       No intake or output data in the 24 hours ending 25 1115      Patient seen and examined. Chart reviewed. Labs, data and other pertinent notes reviewed in last 24 hrs.    PMH/SH/FH 
                                                                                                                                              Name: Tiera Ramon   MRN: [id number]  : 1955    Assessment   :                                               Plan:  ESKD  L AVF  Mild hyponatremia  UTI  DM  Obesity class 3 (BMI > 40)  HTN-not on meds. Takes midodrine   Anemia of eskd  Renal masses- complex cysts vs pyelo vs tumor.  MWF HD at [facility] ([facility]).     Seen on HD at 1000.  BP stable.    On midodrine (not on BP meds)     OP Urology f/u     Hgb > 10, no josselyn for now             Subjective:      No complaint.    Exam:  BP (!) 134/30   Pulse 99   Temp 98.7 °F (37.1 °C) (Oral)   Resp 18   Ht 1.626 m (5' 4.02\")   Wt 114.8 kg (253 lb 1.4 oz)   SpO2 92%   BMI 43.42 kg/m²     NAD  Clear  RRR, distant  No edema  AOx3    Labs/Data:    Lab Results   Component Value Date    WBC 12.6 (H) 2025    HGB 10.6 (L) 2025    HCT 34.2 (L) 2025    MCV 92.9 2025     2025       Lab Results   Component Value Date/Time     2025 05:24 AM    K 4.4 2025 05:24 AM    CL 96 2025 05:24 AM    CO2 27 2025 05:24 AM    BUN 74 2025 05:24 AM    CREATININE 7.56 2025 05:24 AM    GLUCOSE 197 2025 05:24 AM    CALCIUM 8.8 2025 05:24 AM    LABGLOM 5 2025 05:24 AM    LABGLOM 6 2023 10:11 AM        Wt Readings from Last 3 Encounters:   25 114.8 kg (253 lb 1.4 oz)   24 112 kg (246 lb 14.6 oz)         Intake/Output Summary (Last 24 hours) at 2025 1158  Last data filed at 2025 1843  Gross per 24 hour   Intake 1050 ml   Output --   Net 1050 ml           Patient seen and examined. Chart reviewed. Labs, data and other pertinent notes reviewed in last 24 hrs.    PMH/SH/FH reviewed and unchanged compared to H&P    Jermaine Nelson MD             
                                                                                                                                              Name: Tiera Ramon   MRN: [id number]  : 1955    Assessment   :                                               Plan:  ESKD  L AVF  Mild hyponatremia  UTI  DM  Obesity class 3 (BMI > 40)  HTN-not on meds. Takes midodrine   Anemia of eskd  Renal masses- complex cysts vs pyelo vs tumor.  MWF HD at [facility] ([facility]). HD tomm     Oral iron stopped- not needed. Gets IV iron with HD    On midodrine (not on BP meds)     OP Urology f/u     Hgb > 10, no josselyn for now     D/W pt, HD RN       Subjective:  Resting. No acute c/o    ROS:   No nausea, no vomiting  No chest pain, no shortness of breath    Exam:  BP (!) 114/42   Pulse (!) 109   Temp 98.4 °F (36.9 °C) (Oral)   Resp 16   Ht 1.626 m (5' 4\")   Wt 114.8 kg (253 lb 1.4 oz)   SpO2 99%   BMI 43.44 kg/m²     NAD  L AVF patent  No edema  AOx3    Labs/Data:    Lab Results   Component Value Date    WBC 12.3 (H) 2025    HGB 10.4 (L) 2025    HCT 33.6 (L) 2025    MCV 90.3 2025     2025       Lab Results   Component Value Date/Time     2025 10:02 AM    K 4.8 2025 10:02 AM    CL 96 2025 10:02 AM    CO2 26 2025 10:02 AM    BUN 55 2025 10:02 AM    CREATININE 7.66 2025 10:02 AM    GLUCOSE 115 2025 10:02 AM    CALCIUM 8.8 2025 10:02 AM    LABGLOM 5 2025 10:02 AM    LABGLOM 6 2023 10:11 AM        Wt Readings from Last 3 Encounters:   25 114.8 kg (253 lb 1.4 oz)   24 112 kg (246 lb 14.6 oz)         Intake/Output Summary (Last 24 hours) at 2025 1507  Last data filed at 2025 0230  Gross per 24 hour   Intake 120 ml   Output --   Net 120 ml       Patient seen and examined. Chart reviewed. Labs, data and other pertinent notes reviewed in last 24 hrs.    PMH/SH/FH reviewed and unchanged compared to 
                                                                                                                                              Name: Tiera Ramon   MRN: [id number]  : 1955    Assessment   :                                               Plan:  ESKD  L AVF  Mild hyponatremia  UTI  DM  Obesity class 3 (BMI > 40)  HTN-not on meds. Takes midodrine   Anemia of eskd  Renal masses- complex cysts vs pyelo vs tumor.  MWF HD at [facility] ([facility]).     Awaiting placement.  If patient still here, will plan HD tomorrow    Oral iron stopped- not needed. Gets IV iron with HD    On midodrine (not on BP meds)     OP Urology f/u     Hgb > 10, no josselyn for now     D/W pt, HD RN       Subjective:  Resting.  No acute complaints    Exam:  /64   Pulse (!) 103   Temp 98.1 °F (36.7 °C)   Resp 18   Ht 1.626 m (5' 4\")   Wt 114.8 kg (253 lb 1.4 oz)   SpO2 98%   BMI 43.44 kg/m²     NAD  No edema  AOx3    Labs/Data:    Lab Results   Component Value Date    WBC 10.9 2025    HGB 11.1 (L) 2025    HCT 37.0 2025    MCV 93.4 2025     2025       Lab Results   Component Value Date/Time     2025 05:33 AM    K 4.5 2025 05:33 AM    CL 97 2025 05:33 AM    CO2 29 2025 05:33 AM    BUN 34 2025 05:33 AM    CREATININE 4.92 2025 05:33 AM    GLUCOSE 222 2025 05:33 AM    CALCIUM 9.3 2025 05:33 AM    LABGLOM 9 2025 05:33 AM    LABGLOM 6 2023 10:11 AM        Wt Readings from Last 3 Encounters:   25 114.8 kg (253 lb 1.4 oz)   24 112 kg (246 lb 14.6 oz)         Intake/Output Summary (Last 24 hours) at 2025 1139  Last data filed at 2025 1230  Gross per 24 hour   Intake 500 ml   Output 2000 ml   Net -1500 ml         Patient seen and examined. Chart reviewed. Labs, data and other pertinent notes reviewed in last 24 hrs.    PMH/SH/FH reviewed and unchanged compared to H&P    Massimo Baldwin, 
                                                                                                                                              Name: Tiera Ramon   MRN: [id number]  : 1955    Assessment   :                                               Plan:  ESKD  L AVF  Mild hyponatremia  UTI  DM  Obesity class 3 (BMI > 40)  HTN-not on meds. Takes midodrine   Anemia of eskd  Renal masses- complex cysts vs pyelo vs tumor.  MWF HD at [facility] ([facility]).     HD today- using L AVF, 3.5 hrs, 300 QB, UF of 2 Kg as tolerated. /57 during visit. 2k/2.5 Ca    On midodrine (not on BP meds)     OP Urology f/u     Hgb > 10, no josselyn for now    Next HD on Monday if pt still here. Told to f/u with Urology as outpt for her complex renal cysts     D/W pt, HD RN       Subjective:  See on HD at 11:30 am. Tolerating well    Exam:  BP (!) 129/45   Pulse 96   Temp 97.3 °F (36.3 °C)   Resp 16   Ht 1.626 m (5' 4.02\")   Wt 114.8 kg (253 lb 1.4 oz)   SpO2 95%   BMI 43.42 kg/m²     NAD  Clear  RRR, distant  No edema  AOx3    Labs/Data:    Lab Results   Component Value Date    WBC 12.4 (H) 2025    HGB 10.3 (L) 2025    HCT 34.0 (L) 2025    MCV 90.4 2025     2025       Lab Results   Component Value Date/Time     2025 05:24 AM    K 4.2 2025 05:24 AM    CL 99 2025 05:24 AM    CO2 27 2025 05:24 AM    BUN 53 2025 05:24 AM    CREATININE 6.30 2025 05:24 AM    GLUCOSE 166 2025 05:24 AM    CALCIUM 8.9 2025 05:24 AM    LABGLOM 7 2025 05:24 AM    LABGLOM 6 2023 10:11 AM        Wt Readings from Last 3 Encounters:   25 114.8 kg (253 lb 1.4 oz)   24 112 kg (246 lb 14.6 oz)       No intake or output data in the 24 hours ending 25 1157        Patient seen and examined. Chart reviewed. Labs, data and other pertinent notes reviewed in last 24 hrs.    PMH/SH/FH reviewed and unchanged compared to 
      Hospitalist Progress Note    NAME:   Tiera Ramon   : 1955   MRN: [id number]     Date/Time: 2025 12:45 PM  Patient PCP: None, None    Estimated discharge date:  Barriers: SNF, stable for discharge    Assessment / Plan:    Complicated UTI  -Urine culture showed no growth but will continue empiric ceftriaxone due to abnormal UA and also suspected pyelonephritis on imaging  No fever    ESRD on MWF IHD  -Renal following for hemodialysis needs.  Check BMP in a.m. tomorrow  Had session of dialysis yesterday    Multiple hypodensities in both kidneys  -Ultrasound shows hypoechoic renal masses likely cysts  -Urology recommendations noted and recommend outpatient follow-up.       Essential hypertension  Hyperlipidemia  Continue PTA aspirin, atorvastatin, Coreg, hydralazine, losartan    Orthostatic hypotension/resolved  -Patient developed severe orthostatic hypotension this morning while working with physical therapy and also had a near syncopal episode  -Continue to hold home Coreg, hydralazine and also losartan.  -At discharge may have to hold most of her blood pressure medications and she will need close blood pressure monitoring at home  -BP improved with scheduled midodrine  -compression stockings  -No IV fluids due to ESRD status  -repeat orthostatic vitals today.     Blood pressure slightly better, patient tachycardic, will resume Coreg, continue to hold hydralazine and losartan    Heart rate is better after resuming Coreg    Reduce Coreg to 6.25 mg Bid    Increase Coreg to 12.5 Mg p.o. twice daily  Had dialysis session     Diabetes mellitus  Continue PTA glargine 30 units daily  Corrective coverage insulin  Accu-Cheks  Diabetic diet  Hypoglycemia protocol in place     COPD  Formally substitution arformoterol budesonide nebulizer  Continue PTA montelukast  As needed albuterol nebulizer     MDD, UNA  Continue PTA Paxil, Seroquel     Obesity class III by BMI 42.05  Recommend 
      Hospitalist Progress Note    NAME:   Tiera Ramon   : 1955   MRN: [id number]     Date/Time: 2025 1:52 PM  Patient PCP: None, None    Estimated discharge date:  Barriers: SNF, stable for discharge    Assessment / Plan:    Complicated UTI  -Urine culture showed no growth but will continue empiric ceftriaxone due to abnormal UA and also suspected pyelonephritis on imaging  No fever    ESRD on MWF IHD  -Renal following for hemodialysis needs.  Check BMP in a.m. tomorrow  Had session of dialysis today    Multiple hypodensities in both kidneys  -Ultrasound shows hypoechoic renal masses likely cysts  -Urology recommendations noted and recommend outpatient follow-up.       Essential hypertension  Hyperlipidemia  Continue PTA aspirin, atorvastatin, Coreg, hydralazine, losartan    Orthostatic hypotension  -Patient developed severe orthostatic hypotension this morning while working with physical therapy and also had a near syncopal episode  -Continue to hold home Coreg, hydralazine and also losartan.  -At discharge may have to hold most of her blood pressure medications and she will need close blood pressure monitoring at home  -BP improved with scheduled midodrine  -compression stockings  -No IV fluids due to ESRD status  -repeat orthostatic vitals today.     Blood pressure slightly better, patient tachycardic, will resume Coreg, continue to hold hydralazine and losartan    Heart rate is better after resuming Coreg    Reduce Coreg to 6.25 mg Bid     Diabetes mellitus  Continue PTA glargine 30 units daily  Corrective coverage insulin  Accu-Cheks  Diabetic diet  Hypoglycemia protocol in place     COPD  Formally substitution arformoterol budesonide nebulizer  Continue PTA montelukast  As needed albuterol nebulizer     MDD, UNA  Continue PTA Paxil, Seroquel     Obesity class III by BMI 42.05  Recommend medically assisted weight loss in outpatient setting     Incidental 3 mm pulmonary 
      Hospitalist Progress Note    NAME:   Tiera Ramon   : 1955   MRN: [id number]     Date/Time: 2025 2:12 PM  Patient PCP: None, None    Estimated discharge date:  Barriers: SNF, stable for discharge    Assessment / Plan:    Complicated UTI  -Urine culture showed no growth, was treated with empiric ceftriaxone due to abnormal UA and also suspected pyelonephritis on imaging  No fever    ESRD on MWF IHD  -appreciate nephrology consult    Multiple hypodensities in both kidneys  -Ultrasound shows hypoechoic renal masses likely cysts  -Urology recommendations noted and recommend outpatient follow-up.       Essential hypertension  Hyperlipidemia  Continue PTA aspirin, atorvastatin, Coreg, hold hydralazine and losartan.     Orthostatic hypotension/resolved  -started on midodrine  -on coreg  Hydralazine and losartan held      Diabetes mellitus  Continue PTA glargine 30 units daily  Corrective coverage insulin  Accu-Cheks  Diabetic diet  Hypoglycemia protocol in place     COPD  Formally substitution arformoterol budesonide nebulizer  Continue PTA montelukast  As needed albuterol nebulizer     MDD, UNA  Continue PTA Paxil, Seroquel     Obesity class III by BMI 42.05  Recommend medically assisted weight loss in outpatient setting     Incidental 3 mm pulmonary nodule  -Notified patient about this abnormal finding and need for further follow-up with PCP with repeat CT scan in about 3 to 6 months        Medical Decision Making:   I personally reviewed labs: CBC, BMP  I personally reviewed imaging: I personally reviewed EKG: Telemetry  Toxic drug monitoring: Monitor blood sugar due to risk of hypoglycemia while on insulin  Discussed case with: patient, RN, CM, plan of care discussed        Code Status: Full code  DVT Prophylaxis: Heparin      Subjective:     Chief Complaint / Reason for Physician patient was sitting on the chair. Denies any new complaints.  Awaiting insurance auth. Medically stable for 
      Hospitalist Progress Note    NAME:   Tiera Ramon   : 1955   MRN: [id number]     Date/Time: 2025 11:39 AM  Patient PCP: None, None    Estimated discharge date:  Barriers:       Assessment / Plan:    Complicated UTI  -Urine culture showed no growth but will continue empiric ceftriaxone due to abnormal UA and also suspected pyelonephritis on imaging    ESRD on MWF IHD  -Renal following for hemodialysis needs.  Check BMP in a.m. tomorrow    Multiple hypodensities in both kidneys  -Ultrasound shows hypoechoic renal masses likely cysts  -Urology recommendations noted and recommend outpatient follow-up.       Essential hypertension  Hyperlipidemia  Continue PTA aspirin, atorvastatin, Coreg, hydralazine, losartan    Orthostatic hypotension  -Patient developed severe orthostatic hypotension this morning while working with physical therapy and also had a near syncopal episode  -Continue to hold home Coreg, hydralazine and also losartan.  -At discharge may have to hold most of her blood pressure medications and she will need close blood pressure monitoring at home  -will schedule midodrine  -Add compression stockings  -No IV fluids due to ESRD status     Diabetes mellitus  Continue PTA glargine 30 units daily  Corrective coverage insulin  Accu-Cheks  Diabetic diet  Hypoglycemia protocol in place     COPD  Formally substitution arformoterol budesonide nebulizer  Continue PTA montelukast  As needed albuterol nebulizer     MDD, UNA  Continue PTA Paxil, Seroquel     Obesity class III by BMI 42.05  Recommend medically assisted weight loss in outpatient setting     Incidental 3 mm pulmonary nodule  -Notified patient about this abnormal finding and need for further follow-up with PCP with repeat CT scan in about 3 to 6 months        Medical Decision Making:   I personally reviewed labs: CBC, BMP  I personally reviewed imaging: CT abdomen  I personally reviewed EKG: Telemetry  Toxic drug monitoring: Monitor 
      Hospitalist Progress Note    NAME:   Tiera Ramon   : 1955   MRN: [id number]     Date/Time: 2025 3:02 PM  Patient PCP: None, None    Estimated discharge date:  Barriers: SNF, stable for discharge    Assessment / Plan:    Complicated UTI  -Urine culture showed no growth but will continue empiric ceftriaxone due to abnormal UA and also suspected pyelonephritis on imaging  No fever    ESRD on MWF IHD  -Renal following for hemodialysis needs.  Check BMP in a.m. tomorrow  Had session of dialysis today    Multiple hypodensities in both kidneys  -Ultrasound shows hypoechoic renal masses likely cysts  -Urology recommendations noted and recommend outpatient follow-up.       Essential hypertension  Hyperlipidemia  Continue PTA aspirin, atorvastatin, Coreg, hydralazine, losartan    Orthostatic hypotension  -Patient developed severe orthostatic hypotension this morning while working with physical therapy and also had a near syncopal episode  -Continue to hold home Coreg, hydralazine and also losartan.  -At discharge may have to hold most of her blood pressure medications and she will need close blood pressure monitoring at home  -BP improved with scheduled midodrine  -compression stockings  -No IV fluids due to ESRD status  -repeat orthostatic vitals today.     Blood pressure slightly better, patient tachycardic, will resume Coreg, continue to hold hydralazine and losartan    Heart rate is better after resuming Coreg    Reduce Coreg to 6.25 mg Bid    Increase Coreg to 12.5 Mg p.o. twice daily  Had dialysis session     Diabetes mellitus  Continue PTA glargine 30 units daily  Corrective coverage insulin  Accu-Cheks  Diabetic diet  Hypoglycemia protocol in place     COPD  Formally substitution arformoterol budesonide nebulizer  Continue PTA montelukast  As needed albuterol nebulizer     MDD, UNA  Continue PTA Paxil, Seroquel     Obesity class III by BMI 42.05  Recommend medically assisted 
      Hospitalist Progress Note    NAME:   Tiera Ramon   : 1955   MRN: [id number]     Date/Time: 2025 9:28 AM  Patient PCP: None, None    Estimated discharge date:  Barriers:       Assessment / Plan:    Complicated UTI  -Urine culture showed no growth but will continue empiric ceftriaxone due to abnormal UA and also suspected pyelonephritis on imaging    ESRD on MWF IHD  -Renal following for hemodialysis needs.  Check BMP in a.m. tomorrow    Multiple hypodensities in both kidneys  -Ultrasound shows hypoechoic renal masses likely cysts  -Urology recommendations noted and recommend outpatient follow-up.       Essential hypertension  Hyperlipidemia  Continue PTA aspirin, atorvastatin, Coreg, hydralazine, losartan    Orthostatic hypotension  -Patient developed severe orthostatic hypotension this morning while working with physical therapy and also had a near syncopal episode  -Continue to hold home Coreg, hydralazine and also losartan.  -At discharge may have to hold most of her blood pressure medications and she will need close blood pressure monitoring at home  -BP improved with scheduled midodrine  -compression stockings  -No IV fluids due to ESRD status  -repeat orthostatic vitals today.      Diabetes mellitus  Continue PTA glargine 30 units daily  Corrective coverage insulin  Accu-Cheks  Diabetic diet  Hypoglycemia protocol in place     COPD  Formally substitution arformoterol budesonide nebulizer  Continue PTA montelukast  As needed albuterol nebulizer     MDD, UNA  Continue PTA Paxil, Seroquel     Obesity class III by BMI 42.05  Recommend medically assisted weight loss in outpatient setting     Incidental 3 mm pulmonary nodule  -Notified patient about this abnormal finding and need for further follow-up with PCP with repeat CT scan in about 3 to 6 months        Medical Decision Making:   I personally reviewed labs: CBC, BMP  I personally reviewed imaging: CT abdomen  I personally reviewed EKG: 
      Hospitalist Progress Note    NAME:   Tiera Ramon   : 1955   MRN: [id number]     Date/Time: 2025 5:05 PM  Patient PCP: None, None    Estimated discharge date:  Barriers: SNF, complete 7 days of IV antibiotic on       Assessment / Plan:    Complicated UTI  -Urine culture showed no growth but will continue empiric ceftriaxone due to abnormal UA and also suspected pyelonephritis on imaging  No fever    ESRD on MWF IHD  -Renal following for hemodialysis needs.  Check BMP in a.m. tomorrow    Multiple hypodensities in both kidneys  -Ultrasound shows hypoechoic renal masses likely cysts  -Urology recommendations noted and recommend outpatient follow-up.       Essential hypertension  Hyperlipidemia  Continue PTA aspirin, atorvastatin, Coreg, hydralazine, losartan    Orthostatic hypotension  -Patient developed severe orthostatic hypotension this morning while working with physical therapy and also had a near syncopal episode  -Continue to hold home Coreg, hydralazine and also losartan.  -At discharge may have to hold most of her blood pressure medications and she will need close blood pressure monitoring at home  -BP improved with scheduled midodrine  -compression stockings  -No IV fluids due to ESRD status  -repeat orthostatic vitals today.     Blood pressure slightly better, patient tachycardic, will resume Coreg, continue to hold hydralazine and losartan     Diabetes mellitus  Continue PTA glargine 30 units daily  Corrective coverage insulin  Accu-Cheks  Diabetic diet  Hypoglycemia protocol in place     COPD  Formally substitution arformoterol budesonide nebulizer  Continue PTA montelukast  As needed albuterol nebulizer     MDD, UNA  Continue PTA Paxil, Seroquel     Obesity class III by BMI 42.05  Recommend medically assisted weight loss in outpatient setting     Incidental 3 mm pulmonary nodule  -Notified patient about this abnormal finding and need for further follow-up with PCP with 
      Hospitalist Progress Note    NAME:   Tiera Ramon   : 1955   MRN: [id number]     Date/Time: 2025 5:01 PM  Patient PCP: None, None    Estimated discharge date:  Barriers: SNF, stable for discharge    Assessment / Plan:    Complicated UTI  -Urine culture showed no growth, was treated with empiric ceftriaxone due to abnormal UA and also suspected pyelonephritis on imaging  No fever    ESRD on MWF IHD  -appreciate nephrology consult    Multiple hypodensities in both kidneys  -Ultrasound shows hypoechoic renal masses likely cysts  -Urology recommendations noted and recommend outpatient follow-up.       Essential hypertension  Hyperlipidemia  Continue PTA aspirin, atorvastatin, Coreg, hydralazine, losartan    Orthostatic hypotension/resolved  -Patient developed severe orthostatic hypotension  morning while working with physical therapy and also had a near syncopal episode  Anti hypertensive held, now resumed  -   Diabetes mellitus  Continue PTA glargine 30 units daily  Corrective coverage insulin  Accu-Cheks  Diabetic diet  Hypoglycemia protocol in place     COPD  Formally substitution arformoterol budesonide nebulizer  Continue PTA montelukast  As needed albuterol nebulizer     MDD, UNA  Continue PTA Paxil, Seroquel     Obesity class III by BMI 42.05  Recommend medically assisted weight loss in outpatient setting     Incidental 3 mm pulmonary nodule  -Notified patient about this abnormal finding and need for further follow-up with PCP with repeat CT scan in about 3 to 6 months        Medical Decision Making:   I personally reviewed labs: CBC, BMP  I personally reviewed imaging: CT abdomen  I personally reviewed EKG: Telemetry  Toxic drug monitoring: Monitor blood sugar due to risk of hypoglycemia while on insulin  Discussed case with: patient, RN, CM, plan of care discussed        Code Status: Full code  DVT Prophylaxis: Heparin      Subjective:     Chief Complaint / Reason for Physician patient 
      Hospitalist Progress Note    NAME:   Tiera Ramon   : 1955   MRN: [id number]     Date/Time: 2025 11:57 AM  Patient PCP: None, None    Estimated discharge date:  Barriers: SNF, stable for discharge    Assessment / Plan:    Complicated UTI  -Urine culture showed no growth but will continue empiric ceftriaxone due to abnormal UA and also suspected pyelonephritis on imaging  No fever    ESRD on MWF IHD  -Renal following for hemodialysis needs.  Check BMP in a.m. tomorrow  Had session of dialysis yesterday    Multiple hypodensities in both kidneys  -Ultrasound shows hypoechoic renal masses likely cysts  -Urology recommendations noted and recommend outpatient follow-up.       Essential hypertension  Hyperlipidemia  Continue PTA aspirin, atorvastatin, Coreg, hydralazine, losartan    Orthostatic hypotension/resolved  -Patient developed severe orthostatic hypotension this morning while working with physical therapy and also had a near syncopal episode  -Continue to hold home Coreg, hydralazine and also losartan.  -At discharge may have to hold most of her blood pressure medications and she will need close blood pressure monitoring at home  -BP improved with scheduled midodrine  -compression stockings  -No IV fluids due to ESRD status  -repeat orthostatic vitals today.     Blood pressure slightly better, patient tachycardic, will resume Coreg, continue to hold hydralazine and losartan    Heart rate is better after resuming Coreg    Reduce Coreg to 6.25 mg Bid    Increase Coreg to 12.5 Mg p.o. twice daily  Had dialysis session     Diabetes mellitus  Continue PTA glargine 30 units daily  Corrective coverage insulin  Accu-Cheks  Diabetic diet  Hypoglycemia protocol in place     COPD  Formally substitution arformoterol budesonide nebulizer  Continue PTA montelukast  As needed albuterol nebulizer     MDD, UNA  Continue PTA Paxil, Seroquel     Obesity class III by BMI 42.05  Recommend 
      Hospitalist Progress Note    NAME:   Tiera Ramon   : 1955   MRN: [id number]     Date/Time: 2025 2:13 PM  Patient PCP: None, None    Estimated discharge date:  Barriers:       Assessment / Plan:    Complicated UTI  -Follow urine culture results.  Continue ceftriaxone.    ESRD on MWF IHD  -Renal following for hemodialysis needs.  Check BMP in a.m. tomorrow    Multiple hypodensities in both kidneys  -Ultrasound shows hypoechoic renal masses likely cysts  -Will consult urology for further evaluation of renal mass       Essential hypertension  Hyperlipidemia  Continue PTA aspirin, atorvastatin, Coreg, hydralazine, losartan     Diabetes mellitus  Continue PTA glargine 30 units daily  Corrective coverage insulin  Accu-Cheks  Diabetic diet  Hypoglycemia protocol in place     COPD  Formally substitution arformoterol budesonide nebulizer  Continue PTA montelukast  As needed albuterol nebulizer     MDD, UNA  Continue PTA Paxil, Seroquel     Obesity class III by BMI 42.05  Recommend medically assisted weight loss in outpatient setting             Medical Decision Making:   I personally reviewed labs: CBC, BMP  I personally reviewed imaging: CT abdomen  I personally reviewed EKG: Telemetry  Toxic drug monitoring: Monitor blood sugar due to risk of hypoglycemia while on insulin  Discussed case with: Pharmacy        Code Status: Full code  DVT Prophylaxis: Heparin  GI Prophylaxis:    Subjective:     Chief Complaint / Reason for Physician Visit  Does not report any abdominal pain, nausea or vomiting.  No chest pain or shortness of breath  Overnight events noted      Objective:     VITALS:   Last 24hrs VS reviewed since prior progress note. Most recent are:  Patient Vitals for the past 24 hrs:   BP Temp Temp src Pulse Resp SpO2   25 1205 (!) 135/45 98.4 °F (36.9 °C) Oral 86 17 98 %   25 0815 (!) 130/45 98.2 °F (36.8 °C) Oral 79 16 96 %   25 0738 -- -- -- -- -- 98 %   25 0339 (!) 122/41 
   01/18/25 1206 01/18/25 1210 01/18/25 1515   Vital Signs   Pulse 97 99 (!) 104   Heart Rate Source  --   --  Monitor   BP (!) 94/41 (!) 102/47 (!) 113/102   MAP (Calculated) 59 65 106   MAP (mmHg) (!) 58 (!) 63 108   BP Location Right lower arm Right lower arm Right lower arm   BP Method Automatic  --   --    Patient Position Sitting Sitting  (after transfer to bedside chair) Sitting       Malina Roman, PT, DPT    
  Physician Progress Note      PATIENT:               BRIGETTE SULLIVAN  [facility] #:                  [id number]  :                       1955  ADMIT DATE:       1/15/2025 7:42 PM  DISCH DATE:  RESPONDING  PROVIDER #:        Katherine Cancino MD          QUERY TEXT:    Patient admitted with UTI in H&P on . In order to support the diagnosis   of UTI, please include additional clinical indicators in your documentation.    Or please document if the diagnosis of UTI has been ruled out after further   study.    The medical record reflects the following:  Risk Factors: Age- 69, Female, DM 2  Clinical Indicators: ED 01/15-Denies fevers, chills, chest pain, SOB, cough,   runny nose, sore throat, flank pain, dysuria, frequency, hesitancy, odor.  H&P - Complicated UTI  IM PN -Complicated UTI, Urine culture showed no growth but will continue   empiric ceftriaxone due to abnormal UA and also suspected pyelonephritis on   imaging  Urology consults - She is oliguric.  No reported gross hematuria.  CT abdomen 01/15- There are multiple hypodensities in both kidneys which   demonstrate intermediate Hounsfield density and are possibly new when compared   to the prior study. Possibilities include multiple renal masses, multifocal   pyelonephritis or multiple complex cysts although this is thought less likely.  UA :  leukocyte esterase- large  Granular cast- 2-5  WBC- >100  Bacteria-3+  Blood, urine-moderate  Urine culture  - No significant growth, <10,000 CFU/mL  Treatment:  cefTRIAXone (ROCEPHIN) 1,000 mg in sterile water 10 mL IV syringe,   urine culture, Nephrology consult  Thank you,  NATE Shelby CDS  Options provided:  -- UTI present as evidenced by, Please document evidence.  -- UTI was ruled out  -- Other - I will add my own diagnosis  -- Disagree - Not applicable / Not valid  -- Disagree - Clinically unable to determine / Unknown  -- Refer to Clinical Documentation Reviewer    PROVIDER RESPONSE 
  Physician Progress Note      PATIENT:               BRIGETTE SULLIVAN  [facility] #:                  [id number]  :                       1955  ADMIT DATE:       1/15/2025 7:42 PM  DISCH DATE:        2025 7:04 PM  RESPONDING  PROVIDER #:        Tawny Perez MD          QUERY TEXT:    Pt admitted with pyelonephritis. Pt noted to have WBC- 12.3, HR- 106, 105,   104, 100,101, 109. If possible, please document in the progress notes and   discharge summary if you are evaluating and /or treating any of the following:  The medical record reflects the following:  Risk Factors: pyelonephritis, age- 69, ESRD, DM 2  Clinical Indicators: H&P - Complicated UTI, Possibilities include   multiple renal masses, multifocal pyelonephritis or  multiple complex cysts although this is thought less likely  Urology consults - Oliguric, CT abdomen pelvis with IV contrast 1/15/2025   noting there are multiple hypodensities in both kidneys which demonstrate   intermediate Hounsfield density and are possibly new when compared to the   prior study. Possibilities include multiple renal masses, multifocal   pyelonephritis or multiple complex cysts although this is thought less likely.  UA :  leukocyte esterase- large  Granular cast- 2-5  WBC- >100  Bacteria-3+  Blood, urine-moderate  WBC-13.2 9 (), 12.3 ()  HR- 106, 105, 104, 100,101, 109. (01/15- )  Treatment: cefTRIAXone (ROCEPHIN) 1,000 mg in sterile water 10 mL IV syringe,   urine culture, Urology consult, serial labs.  Thanks,  Keturah Haro S CDS  Options provided:  -- Sepsis due to pyelonephritis, present on admission  -- Sepsis due to pyelonephritis, developed following admission  -- Pyelonephritis without Sepsis  -- Other - I will add my own diagnosis  -- Disagree - Not applicable / Not valid  -- Disagree - Clinically unable to determine / Unknown  -- Refer to Clinical Documentation Reviewer    PROVIDER RESPONSE TEXT:    This patient has sepsis 
..End of Shift Note    Bedside shift change report given to ASPEN Lima (oncoming nurse) by Samuel Wilkes RN (offgoing nurse).  Report included the following information SBAR    Shift worked:  0700 - 1900     Shift summary and any significant changes:    Pt. Tolerated all medication w/o issue.  No c/o pain or distress.  Dialysis completed during day shift.       Concerns for physician to address: No     Zone phone for oncoming shift:  No       Activity:  Level of Assistance: Minimal assist, patient does 75% or more  Number times ambulated in hallways past shift: 0  Number of times OOB to chair past shift: 0    Cardiac:   Cardiac Monitoring: Yes           Access:  Current line(s): PIV     Genitourinary:   Urinary Status: Oliguria    Respiratory:   O2 Device: None (Room air)  Chronic home O2 use?: NO  Incentive spirometer at bedside: NO    GI:  Last BM (including prior to admit): 01/19/25  Current diet:  ADULT DIET; Regular; 4 carb choices (60 gm/meal); Low Fat/Low Chol/High Fiber/TONYA  Passing flatus: YES    Pain Management:   Patient states pain is manageable on current regimen: YES    Skin:  Pedro Scale Score: 20  Interventions: Wound Offloading (Prevention Methods): Blankets, Pillows, Repositioning, Turning    Patient Safety:  Fall Risk: Nursing Judgement-Fall Risk High(Add Comments): No  Fall Risk Interventions  Nursing Judgement-Fall Risk High(Add Comments): No  Toilet Every 2 Hours-In Advance of Need: Yes  Hourly Visual Checks: In bed, Awake  Fall Visual Posted: Socks  Room Door Open: Yes  Alarm On: Bed  Patient Moved Closer to Nursing Station: No    Active Consults:   IP CONSULT TO NEPHROLOGY  IP CONSULT TO PHARMACY  IP CONSULT TO UROLOGY    Length of Stay:  Expected LOS: 8  Actual LOS: 6    Samuel Wilkes RN                            
..End of Shift Note    Bedside shift change report given to ASPEN Norris (oncoming nurse) by Samuel Wilkes RN (offgoing nurse).  Report included the following information SBAR    Shift worked:  0700 - 1900     Shift summary and any significant changes:    Pt. Tolerated all medication w/o issue.  No c/o pain or distress.  Worked with PT during day shift.  working on placement.     Concerns for physician to address: No     Zone phone for oncoming shift:  No       Activity:  Level of Assistance: Minimal assist, patient does 75% or more  Number times ambulated in hallways past shift: 0  Number of times OOB to chair past shift: 1    Cardiac:   Cardiac Monitoring: Yes           Access:  Current line(s): PIV     Genitourinary:   Urinary Status: Oliguria    Respiratory:   O2 Device: None (Room air)  Chronic home O2 use?: NO  Incentive spirometer at bedside: YES    GI:  Last BM (including prior to admit): 01/19/25  Current diet:  ADULT DIET; Regular; 4 carb choices (60 gm/meal); Low Fat/Low Chol/High Fiber/TONYA  Passing flatus: YES    Pain Management:   Patient states pain is manageable on current regimen: YES    Skin:  Pedro Scale Score: 20  Interventions: Wound Offloading (Prevention Methods): Repositioning, Pillows    Patient Safety:  Fall Risk: Nursing Judgement-Fall Risk High(Add Comments): No  Fall Risk Interventions  Nursing Judgement-Fall Risk High(Add Comments): No  Toilet Every 2 Hours-In Advance of Need: Yes  Hourly Visual Checks: In bed, Eyes closed  Fall Visual Posted: Socks  Room Door Open: Deferred to promote rest  Alarm On: Bed  Patient Moved Closer to Nursing Station: No    Active Consults:   IP CONSULT TO NEPHROLOGY  IP CONSULT TO PHARMACY  IP CONSULT TO UROLOGY    Length of Stay:  Expected LOS: 9  Actual LOS: 7    Samuel Wilkes RN                            
0700. Bedside shift change report given to Kemar LOUISE (oncoming nurse) by Janie RN (offgoing nurse). Report included the following information Nurse Handoff Report, Index, Intake/Output, MAR, Recent Results, and Cardiac Rhythm NSR .     1900. End of Shift Note    Bedside shift change report given to Janie RN (oncoming nurse) by Jae Huber RN (offgoing nurse).  Report included the following information SBAR, Kardex, Intake/Output, MAR, Recent Results, and Cardiac Rhythm NSR    Shift worked:  [id number]     Shift summary and any significant changes:     Dialysis today no issues VSS     Concerns for physician to address:       Zone phone for oncoming shift:          Activity:  Level of Assistance: Moderate assist, patient does 50-74% (VERY WEAK)  Number times ambulated in hallways past shift: 0  Number of times OOB to chair past shift: 1    Cardiac:   Cardiac Monitoring: Yes           Access:  Current line(s): PIV     Genitourinary:   Urinary Status: Voiding, Oliguria    Respiratory:   O2 Device: None (Room air)  Chronic home O2 use?: NO  Incentive spirometer at bedside: N/A    GI:  Last BM (including prior to admit): 01/16/25  Current diet:  ADULT DIET; Regular; 4 carb choices (60 gm/meal); Low Fat/Low Chol/High Fiber/TONYA  Passing flatus: YES    Pain Management:   Patient states pain is manageable on current regimen: YES    Skin:  Pedro Scale Score: 20  Interventions: Wound Offloading (Prevention Methods): Bed, pressure reduction mattress, Turning (turns self)    Patient Safety:  Fall Risk: Nursing Judgement-Fall Risk High(Add Comments): Yes  Fall Risk Interventions  Nursing Judgement-Fall Risk High(Add Comments): Yes  Toilet Every 2 Hours-In Advance of Need: Yes  Hourly Visual Checks: In bed, Eyes closed  Fall Visual Posted: Socks  Room Door Open: Deferred to promote rest  Alarm On: Bed  Patient Moved Closer to Nursing Station: No    Active Consults:   IP CONSULT TO NEPHROLOGY  IP CONSULT TO PHARMACY  IP CONSULT TO 
1212 Attempted to re-site IV, unsuccessfully x2  due to time frame other one has been in.  Currently the #18 that was placed under US in upper right arm remains.  
Assume care of pt.   
Attempt to call report, place on hold over 6 min. Room assigned 3114.   
Attempted to see pt for OT services.  Pt was off the floor for dialysis. Will defer but continue to follow.   
Comprehensive Nutrition Assessment    Type and Reason for Visit:  LOS    Nutrition Recommendations/Plan:   Continue current diet  Monitor and record PO intakes and Bms in I/Os     Malnutrition Assessment:  Malnutrition Status:  No malnutrition (01/23/25 1655)    Context:  Acute Illness     Findings of the 6 clinical characteristics of malnutrition:  Energy Intake:  No decrease in energy intake  Weight Loss:  No weight loss     Body Fat Loss:  Unable to assess     Muscle Mass Loss:  Unable to assess    Fluid Accumulation:  No fluid accumulation     Strength:  Not Performed    Nutrition Assessment:    Admitted for complicated UTI. PMHx includes ESRD on HD, COPD, DM, HF, HTN. Screened for LOS. Pt with good intakes, >50%. No recent significant weight loss EMR. Plan to continue diet.    Nutrition Related Findings:    Labs: Na 133, K 4.5, BUN 34, Creat 4.92, Gluc 299. Meds: atorvastatin, bumetanide, famotidine, ferrous sulfate, insulin glargine, insulin lispro. 1+ generalized edema. BM 1/21. Wound Type: None       Current Nutrition Intake & Therapies:    Average Meal Intake: 51-75%  Average Supplements Intake: None Ordered  ADULT DIET; Regular; 4 carb choices (60 gm/meal); Low Fat/Low Chol/High Fiber/TONYA    Anthropometric Measures:  Height: 162.6 cm (5' 4.02\")  Ideal Body Weight (IBW): 120 lbs (55 kg)    Current Body Weight: 114.8 kg (253 lb 1.4 oz), 210.9 % IBW. Weight Source: Bed scale  Current BMI (kg/m2): 43.4  BMI Categories: Obese Class 3 (BMI 40.0 or greater)    Estimated Daily Nutrient Needs:  Energy Requirements Based On: Kcal/kg  Weight Used for Energy Requirements: Adjusted  Energy (kcal/day): 2088kcal (30kcal/kg)  Weight Used for Protein Requirements: Adjusted  Protein (g/day): 84-104g (1.2-1.5g/kg)  Method Used for Fluid Requirements: 1 ml/kcal  Fluid (ml/day): 2088mL    Nutrition Diagnosis:   No nutrition diagnosis at this time     Nutrition Interventions:   Food and/or Nutrient Delivery: Continue 
Comprehensive Nutrition Assessment    Type and Reason for Visit:  Reassess    Nutrition Recommendations/Plan:   Continue current diet  Monitor and record PO intakes and Bms in I/Os     Malnutrition Assessment:  Malnutrition Status:  No malnutrition (01/23/25 4885)    Context:  Acute Illness     Findings of the 6 clinical characteristics of malnutrition:  Energy Intake:  No decrease in energy intake  Weight Loss:  No weight loss     Body Fat Loss:  Unable to assess     Muscle Mass Loss:  Unable to assess    Fluid Accumulation:  No fluid accumulation     Strength:  Not Performed    Nutrition Assessment:    Follow up. Pending auth. Pt continues with good intakes, >76%. Plan to continue current diet.    Nutrition Related Findings:    Labs: Na 133, K 4.3, BUN 57, Creat 6.7, Gluc 215. Meds: atorvastatin, bumetanide, famotidine, ferrous sulfate, insulin glargine, insulin regular, sevelamer. Generalized trace edema. BM 1/27. Wound Type: None       Current Nutrition Intake & Therapies:    Average Meal Intake: 51-75%  Average Supplements Intake: None Ordered  ADULT DIET; Regular; 4 carb choices (60 gm/meal); Low Fat/Low Chol/High Fiber/TONYA    Anthropometric Measures:  Height: 162.6 cm (5' 4.02\")  Ideal Body Weight (IBW): 120 lbs (55 kg)    Current Body Weight: 114.8 kg (253 lb 1.4 oz), 210.9 % IBW. Weight Source: Bed scale  Current BMI (kg/m2): 43.4  BMI Categories: Obese Class 3 (BMI 40.0 or greater)    Estimated Daily Nutrient Needs:  Energy Requirements Based On: Kcal/kg  Weight Used for Energy Requirements: Adjusted  Energy (kcal/day): 2088kcal (30kcal/kg)  Weight Used for Protein Requirements: Adjusted  Protein (g/day): 84-104g (1.2-1.5g/kg)  Method Used for Fluid Requirements: 1 ml/kcal  Fluid (ml/day): 2088mL    Nutrition Diagnosis:   No nutrition diagnosis at this time     Nutrition Interventions:   Food and/or Nutrient Delivery: Continue Current Diet  Nutrition Education/Counseling: No recommendation at this 
Deferred visit: The patient is back from dialysis but feels too tired to go to the chair..  Will continue to follow.   
Deferred visit: The patient is currently on dialysis.  Will continue to follow.  
End of Shift Note    Bedside shift change report given to ASPEN Caldera (oncoming nurse) by Jr Frankel RN (offgoing nurse).  Report included the following information SBAR, Intake/Output, and MAR    Shift worked: 11pm - 7am     Shift summary and any significant changes:    Received pt Aox4. On room air no respiratory distress noted. Abx given as ordered. C/o pain to RLQ radiates to R lower back.PRN pain meds given. No urine output.     Concerns for physician to address: Low diastolic BP      Zone phone for oncoming shift:          Activity:  Level of Assistance: Moderate assist, patient does 50-74%  Number times ambulated in hallways past shift: 0  Number of times OOB to chair past shift: 0    Cardiac:   Cardiac Monitoring: No           Access:  Current line(s): PIV     Genitourinary:   Urinary Status: Voiding, Oliguria    Respiratory:   O2 Device: None (Room air)  Chronic home O2 use?: NO  Incentive spirometer at bedside: NO    GI:  Last BM (including prior to admit): 01/16/25  Current diet:  ADULT DIET; Regular; 4 carb choices (60 gm/meal); Low Fat/Low Chol/High Fiber/TONYA  Passing flatus:     Pain Management:   Patient states pain is manageable on current regimen: YES    Skin:  Pedro Scale Score: 20  Interventions: Wound Offloading (Prevention Methods): Bed, pressure reduction mattress, Repositioning    Patient Safety:  Fall Risk: Nursing Judgement-Fall Risk High(Add Comments): Yes  Fall Risk Interventions  Nursing Judgement-Fall Risk High(Add Comments): Yes  Toilet Every 2 Hours-In Advance of Need: Yes  Hourly Visual Checks: In bed, Eyes closed  Fall Visual Posted: Socks  Room Door Open: Deferred to promote rest  Alarm On: Bed  Patient Moved Closer to Nursing Station: No    Active Consults:   IP CONSULT TO NEPHROLOGY  IP CONSULT TO PHARMACY  IP CONSULT TO UROLOGY    Length of Stay:  Expected LOS: 3  Actual LOS: 2    Jr Frankel RN                           
End of Shift Note    Bedside shift change report given to ASPEN De Los Santos (oncoming nurse) by Joie Heard LPN (offgoing nurse).  Report included the following information SBAR    Shift worked:  7a-7p     Shift summary and any significant changes:    Dialysis completed this AM, pt tolerated well. Pt up as tolerated with assist in room. Pt tolerating diet. Pt denies any complaints of pain throughout the day. Uneventful day.      Concerns for physician to address:  none     Zone phone for oncoming shift:   [phone]         Joie Heard LPN                            
End of Shift Note    Bedside shift change report given to ASPEN Lima (oncoming nurse) by Joie Heard LPN (offgoing nurse).  Report included the following information SBAR    Shift worked:  7a-7[     Shift summary and any significant changes:    Pt arrived on unit 1850 made comfortable. Pt denies any complaints of pain at this time. VSS.      Concerns for physician to address:  none     Zone phone for oncoming shift:   [phone]           Joie Heard LPN                            
End of Shift Note    Bedside shift change report given to ASPEN Lima (oncoming nurse) by João Santiago RN (offgoing nurse).  Report included the following information SBAR, Intake/Output, MAR, and Recent Results    Shift worked:  7a-730p     Shift summary and any significant changes:     Pt up to chair majority of day. Charge RN attempted to change IV site due to it being 7 days old - with no success after 2 attempts. No c/o pain, n/v. Used front rolling walker to ambulate to bathroom well. Tolerating diet.       Concerns for physician to address:       Zone phone for oncoming shift:          Activity:     Number times ambulated in hallways past shift: 0  Number of times OOB to chair past shift: 1    Cardiac:   Cardiac Monitoring: Yes           Access:   Current line(s): Peripheral IV    Genitourinary:   Urinary status: Patient is voiding without difficulty.    Respiratory:      Chronic home O2 use?: NO  Incentive spirometer at bedside: NO       GI:     Current diet:  ADULT DIET; Regular; 4 carb choices (60 gm/meal); Low Fat/Low Chol/High Fiber/TONYA  Passing flatus: Yes  Tolerating current diet: Yes       Pain Management:   Patient states pain is manageable on current regimen: Yes    Skin:     Interventions:     Patient Safety:  Fall Score: PA Fall Risk Score: 88.62    Interventions:           Length of Stay:  Expected LOS: 12  Actual LOS: 10      João Santiago RN                            
End of Shift Note    Bedside shift change report given to ASPEN Lima (oncoming nurse) by João Santiago RN (offgoing nurse).  Report included the following information SBAR, Intake/Output, MAR, and Recent Results    Shift worked:  7a-730p     Shift summary and any significant changes:     Pt up to chair majority of day. Independently cleaned self up - RN changed linens. No c/o pain, n/v. Used front rolling walker to ambulate to bathroom well. Tolerating diet.       Concerns for physician to address:       Zone phone for oncoming shift:          Activity:     Number times ambulated in hallways past shift: 0  Number of times OOB to chair past shift: 2    Cardiac:   Cardiac Monitoring: Yes           Access:   Current line(s): Peripheral IV    Genitourinary:   Urinary status: Patient is voiding without difficulty.    Respiratory:      Chronic home O2 use?: NO  Incentive spirometer at bedside: NO       GI:     Current diet:  ADULT DIET; Regular; 4 carb choices (60 gm/meal); Low Fat/Low Chol/High Fiber/TONYA  Passing flatus: Yes  Tolerating current diet: Yes       Pain Management:   Patient states pain is manageable on current regimen: Yes    Skin:     Interventions:     Patient Safety:  Fall Score: PA Fall Risk Score: 85.35    Interventions:           Length of Stay:  Expected LOS: 12  Actual LOS: 9      João Santiago RN                            
End of Shift Note    Bedside shift change report given to ASPEN Monge (oncoming nurse) by Alina Bui LPN (offgoing nurse).  Report included the following information SBAR, Intake/Output, MAR, and Recent Results    Shift worked:  7a-7p     Shift summary and any significant changes:     Worked with PT/OT today. Up in chair for several hours. No complaints of pain. Tolerating diet. SSI given with dinner today.      Concerns for physician to address:  none     Zone phone for oncoming shift:          Activity:  Level of Assistance: Minimal assist, patient does 75% or more  Number times ambulated in hallways past shift: 0  Number of times OOB to chair past shift: 1    Cardiac:   Cardiac Monitoring: Yes           Access:  Current line(s): PIV     Genitourinary:   Urinary Status: Oliguria    Respiratory:   O2 Device: None (Room air)  Chronic home O2 use?: NO  Incentive spirometer at bedside: NO    GI:  Last BM (including prior to admit): 01/19/25  Current diet:  ADULT DIET; Regular; 4 carb choices (60 gm/meal); Low Fat/Low Chol/High Fiber/TONYA  Passing flatus: YES    Pain Management:   Patient states pain is manageable on current regimen: YES    Skin:  Pedro Scale Score: 19  Interventions: Wound Offloading (Prevention Methods): Turning, Repositioning, Pillows    Patient Safety:  Fall Risk: Nursing Judgement-Fall Risk High(Add Comments): No  Fall Risk Interventions  Nursing Judgement-Fall Risk High(Add Comments): No  Toilet Every 2 Hours-In Advance of Need: Yes  Hourly Visual Checks: Awake, In bed  Fall Visual Posted: Socks  Room Door Open: Yes  Alarm On: Bed  Patient Moved Closer to Nursing Station: No    Active Consults:   IP CONSULT TO NEPHROLOGY  IP CONSULT TO PHARMACY  IP CONSULT TO UROLOGY    Length of Stay:  Expected LOS: 8  Actual LOS: 5    Alina Bui LPN                            
End of Shift Note    Bedside shift change report given to ASPEN Moyer (oncoming nurse) by Jr Frankel RN (offgoing nurse).  Report included the following information SBAR, Intake/Output, and MAR    Shift worked: 7pm - 7am     Shift summary and any significant changes:    No complaints. Ambulated to the bathroom. Voided and had a BM.     Concerns for physician to address: None     Zone phone for oncoming shift:         Activity:  Level of Assistance: Minimal assist, patient does 75% or more  Number times ambulated in hallways past shift: 0  Number of times OOB to chair past shift: 1    Cardiac:   Cardiac Monitoring: No      Cardiac Rhythm: Sinus tachy    Access:  Current line(s): PIV     Genitourinary:   Urinary Status: Voiding    Respiratory:   O2 Device: None (Room air)  Chronic home O2 use?: NO  Incentive spirometer at bedside: NO    GI:  Last BM (including prior to admit): 01/25/25  Current diet:  ADULT DIET; Regular; 4 carb choices (60 gm/meal); Low Fat/Low Chol/High Fiber/TONYA  Passing flatus: YES    Pain Management:   Patient states pain is manageable on current regimen: YES    Skin:  Pedro Scale Score: 20  Interventions: Wound Offloading (Prevention Methods): Bed, pressure redistribution/air    Patient Safety:  Fall Risk: Nursing Judgement-Fall Risk High(Add Comments): Yes  Fall Risk Interventions  Nursing Judgement-Fall Risk High(Add Comments): Yes  Toilet Every 2 Hours-In Advance of Need: Yes  Hourly Visual Checks: Awake, In bed  Fall Visual Posted: Socks, Fall sign posted  Room Door Open: Deferred to promote rest  Alarm On: Bed  Patient Moved Closer to Nursing Station: No    Active Consults:   IP CONSULT TO NEPHROLOGY  IP CONSULT TO PHARMACY  IP CONSULT TO UROLOGY    Length of Stay:  Expected LOS: 12  Actual LOS: 9    Jr Frankel RN                           
End of Shift Note    Bedside shift change report given to ASPEN Rodney (oncoming nurse) by Jr Frankel RN (offgoing nurse).  Report included the following information SBAR and MAR    Shift worked: 7pm - 7am     Shift summary and any significant changes:    2 units of insulin given for  at 2116. Abx given as ordered. Ambulated to the bathroom voided and had BM. No complaints. For dialysis today.     Concerns for physician to address: None     Zone phone for oncoming shift:          Activity:  Level of Assistance: Minimal assist, patient does 75% or more  Number times ambulated in hallways past shift: 0  Number of times OOB to chair past shift: 1    Cardiac:   Cardiac Monitoring: No           Access:  Current line(s): PIV     Genitourinary:   Urinary Status: Voiding    Respiratory:   O2 Device: None (Room air)  Chronic home O2 use?: NO  Incentive spirometer at bedside: NO    GI:  Last BM (including prior to admit): 01/23/25  Current diet:  ADULT DIET; Regular; 4 carb choices (60 gm/meal); Low Fat/Low Chol/High Fiber/TONYA  Passing flatus: YES    Pain Management:   Patient states pain is manageable on current regimen: YES    Skin:  Pedro Scale Score: 20  Interventions: Wound Offloading (Prevention Methods): Bed, pressure redistribution/air    Patient Safety:  Fall Risk: Nursing Judgement-Fall Risk High(Add Comments): Yes  Fall Risk Interventions  Nursing Judgement-Fall Risk High(Add Comments): Yes  Toilet Every 2 Hours-In Advance of Need: Yes  Hourly Visual Checks: In chair, Awake  Fall Visual Posted: Socks, Fall sign posted  Room Door Open: Deferred to promote rest  Alarm On: Bed  Patient Moved Closer to Nursing Station: No    Active Consults:   IP CONSULT TO NEPHROLOGY  IP CONSULT TO PHARMACY  IP CONSULT TO UROLOGY    Length of Stay:  Expected LOS: 9  Actual LOS: 8    Jr Frankel RN                           
End of Shift Note    Bedside shift change report given to Bia (oncoming nurse) by Osman Sow RN (offgoing nurse).  Report included the following information SBAR, Kardex, Intake/Output, and MAR    Shift worked:  7a-7p     Shift summary and any significant changes:     VSS. No complaints of pain. Tolerating diet. Uneventful     Concerns for physician to address:       Zone phone for oncoming shift:          Activity:  Level of Assistance: Standby assist, set-up cues, supervision of patient - no hands on  Number times ambulated in hallways past shift: 1  Number of times OOB to chair past shift: 2    Cardiac:   Cardiac Monitoring: No      Cardiac Rhythm: Sinus rhythm    Access:  Current line(s): Other No IV MD aware    Genitourinary:   Urinary Status: Voiding, Bathroom privileges, Oliguria    Respiratory:   O2 Device: None (Room air)  Chronic home O2 use?: N/A  Incentive spirometer at bedside: N/A    GI:  Last BM (including prior to admit): 01/27/25  Current diet:  ADULT DIET; Regular; 4 carb choices (60 gm/meal); Low Fat/Low Chol/High Fiber/TONYA  Passing flatus: YES    Pain Management:   Patient states pain is manageable on current regimen: YES    Skin:  Pedro Scale Score: 20  Interventions: Wound Offloading (Prevention Methods): Repositioning    Patient Safety:  Fall Risk: Nursing Judgement-Fall Risk High(Add Comments): Yes  Fall Risk Interventions  Nursing Judgement-Fall Risk High(Add Comments): Yes  Toilet Every 2 Hours-In Advance of Need: Yes  Hourly Visual Checks: Awake, In chair  Fall Visual Posted: Socks, Fall sign posted  Room Door Open: Deferred to decrease stimulation  Alarm On: Chair  Patient Moved Closer to Nursing Station: No    Active Consults:   IP CONSULT TO NEPHROLOGY  IP CONSULT TO PHARMACY  IP CONSULT TO UROLOGY    Length of Stay:  Expected LOS: 14  Actual LOS: 12    Osman Sow, ASPEN                           
End of Shift Note    Bedside shift change report given to Jaja LOUISE (oncoming nurse) by MARK Corona RN (offgoing nurse).  Report included the following information SBAR, Intake/Output, and MAR    Shift worked:  7pm-7am     Shift summary and any significant changes:     No complains raised in this shift. 4 units of insulin given at 2125 for blood glucose of 297. Ambulating to the bathroom with front rolling walker.Diet well tolerated. HD today.     Concerns for physician to address:       Zone phone for oncoming shift:              MARK Corona RN                           
End of Shift Note    Bedside shift change report given to Jersey LOUISE (oncoming nurse) by MARK Corona RN (offgoing nurse).  Report included the following information SBAR, Intake/Output, and MAR    Shift worked:  7PM-7AM     Shift summary and any significant changes:     Pt c/o pain x1 on her R knee and PRN po med Tylenol given. No BM on this shift. Abx given as per order. Diet tolerated.Ambulated to the bathroom  with walker and voided x1.     Concerns for physician to address:       Zone phone for oncoming shift:   [phone]           MARK Corona, ASPEN                           
End of Shift Note    Bedside shift change report given to João LOUISE (oncoming nurse) by MARK Corona RN (offgoing nurse).  Report included the following information SBAR, Intake/Output, and MAR    Shift worked:  7pm-7am     Shift summary and any significant changes:     No complain of pain in this shift. No BM in this shift. VSS. IV antibiotic given as per order. Otherwise uneventful.     Concerns for physician to address:       Zone phone for oncoming shift:   [phone]           MARK Corona RN                           
End of Shift Note    Bedside shift change report given to Jr (oncoming nurse) by Osman Sow RN (offgoing nurse).  Report included the following information SBAR, Kardex, Intake/Output, and MAR    Shift worked:  7a-7p. Gained care of patient at 1500     Shift summary and any significant changes:     VSS. BP soft side. Notified MD. MD ordered to hold coreg. Tolerating diet. No complaints of pain. Uneventful     Concerns for physician to address:       Zone phone for oncoming shift:          Activity:  Level of Assistance: Independent  Number times ambulated in hallways past shift: 0  Number of times OOB to chair past shift: 2    Cardiac:   Cardiac Monitoring: No      Cardiac Rhythm: Sinus tachy    Access:  Current line(s): PIV     Genitourinary:   Urinary Status: Voiding    Respiratory:   O2 Device: None (Room air)  Chronic home O2 use?: N/A  Incentive spirometer at bedside: N/A    GI:  Last BM (including prior to admit): 01/24/25  Current diet:  ADULT DIET; Regular; 4 carb choices (60 gm/meal); Low Fat/Low Chol/High Fiber/TONYA  Passing flatus: YES    Pain Management:   Patient states pain is manageable on current regimen: YES    Skin:  Pedro Scale Score: 20  Interventions: Wound Offloading (Prevention Methods): Repositioning    Patient Safety:  Fall Risk: Nursing Judgement-Fall Risk High(Add Comments): Yes  Fall Risk Interventions  Nursing Judgement-Fall Risk High(Add Comments): Yes  Toilet Every 2 Hours-In Advance of Need: Yes  Hourly Visual Checks: Awake  Fall Visual Posted: Socks, Fall sign posted  Room Door Open: Deferred to promote rest  Alarm On: Bed  Patient Moved Closer to Nursing Station: No    Active Consults:   IP CONSULT TO NEPHROLOGY  IP CONSULT TO PHARMACY  IP CONSULT TO UROLOGY    Length of Stay:  Expected LOS: 12  Actual LOS: 8    Osman Sow RN                           
End of Shift Note    Bedside shift change report given to Julio Cesar RN  (oncoming nurse) by Hafsa Cadena LPN (offgoing nurse).  Report included the following information SBAR, Intake/Output, MAR, Accordion, Recent Results, and Med Rec Status    Shift worked:  7p-7a     Shift summary and any significant changes:    Pt c/o pain, given tylenol x1. Pt ambulated to bathroom with assistance x1, voiding x1. Pt hypotensive, Given bedtime midodrine and bumex held. Rapid response aware of patient condition and rounds on patient throughout shift. Labs completed. BP normalized by 0300.      Concerns for physician to address: Hypotension, Patient stated she takes medication to help lower phosphorus. Pt is concerned because she hasn't taken this medication in a few days.      Zone phone for oncoming shift:  [phone]       Activity:  Level of Assistance: Standby assist, set-up cues, supervision of patient - no hands on  Number times ambulated in hallways past shift: 0  Number of times OOB to chair past shift: 0    Cardiac:   Cardiac Monitoring: No      Cardiac Rhythm: Sinus rhythm    Access:  Current line(s): Other without access    Genitourinary:   Urinary Status: Oliguria    Respiratory:   O2 Device: None (Room air)  Chronic home O2 use?: NO  Incentive spirometer at bedside: N/A    GI:  Last BM (including prior to admit): 01/27/25  Current diet:  ADULT DIET; Regular; 4 carb choices (60 gm/meal); Low Fat/Low Chol/High Fiber/TONYA  Passing flatus: YES    Pain Management:   Patient states pain is manageable on current regimen: YES    Skin:  Pedro Scale Score: 22  Interventions: Wound Offloading (Prevention Methods): Repositioning    Patient Safety:  Fall Risk: Nursing Judgement-Fall Risk High(Add Comments): Yes  Fall Risk Interventions  Nursing Judgement-Fall Risk High(Add Comments): Yes  Toilet Every 2 Hours-In Advance of Need: Yes  Hourly Visual Checks: Awake, In bed  Fall Visual Posted: Socks  Room Door Open: Deferred to decrease 
End of Shift Note    Bedside shift change report given to Kemar LOUISE (oncoming nurse) by Â Janie Corona RN (offgoing nurse).  Report included the following information SBAR, Intake/Output, and MAR    Shift worked:  7pm-7am     Shift summary and any significant changes:     Pt didn't complain of pain. IV antibiotic given as per MAR. No raised concern an eventful shift     Concerns for physician to address:       Zone phone for oncoming shift:              MARK Corona RN                           
End of Shift Note    Bedside shift change report given to Osman LOUISE (oncoming nurse) by Nell Landaverde RN (offgoing nurse).  Report included the following information SBAR, Intake/Output, MAR, and Recent Results    Shift worked:  7P-7A     Shift summary and any significant changes:     Patient tolerated care well. Scheduled medications given per MAR. Patient bathed self with soap and water. Patient had visitors overnight. Patient teaching done and timely rounding done. No complaint of pain.     Concerns for physician to address:       Zone phone for oncoming shift:          Activity:  Level of Assistance: Standby assist, set-up cues, supervision of patient - no hands on  Number times ambulated in hallways past shift: 0  Number of times OOB to chair past shift: 1    Cardiac:   Cardiac Monitoring: No      Cardiac Rhythm: Sinus rhythm    Access:  Current line(s): PIV     Genitourinary:   Urinary Status: Voiding, Bathroom privileges    Respiratory:   O2 Device: None (Room air)  Chronic home O2 use?: NO  Incentive spirometer at bedside: NO    GI:  Last BM (including prior to admit): 01/27/25  Current diet:  ADULT DIET; Regular; 4 carb choices (60 gm/meal); Low Fat/Low Chol/High Fiber/TONYA  Passing flatus: YES    Pain Management:   Patient states pain is manageable on current regimen: YES    Skin:  Pedro Scale Score: 20  Interventions: Wound Offloading (Prevention Methods): Blankets, Pillows, Repositioning    Patient Safety:  Fall Risk: Nursing Judgement-Fall Risk High(Add Comments): No  Fall Risk Interventions  Nursing Judgement-Fall Risk High(Add Comments): No  Toilet Every 2 Hours-In Advance of Need: Yes  Hourly Visual Checks: Awake, In bed  Fall Visual Posted: Armband, Socks, Fall sign posted  Room Door Open: Deferred to decrease stimulation  Alarm On: Chair  Patient Moved Closer to Nursing Station: No    Active Consults:   IP CONSULT TO NEPHROLOGY  IP CONSULT TO PHARMACY  IP CONSULT TO UROLOGY    Length of 
End of Shift Note    Bedside shift change report given to Rosalia ACOSTA (oncoming nurse) by Flip Spence, ASPEN (offgoing nurse).  Report included the following information SBAR, Kardex, ED Summary, Intake/Output, MAR, Recent Results, and Cardiac Rhythm      Shift worked:  5714-2844     Shift summary and any significant changes:     Uneventful night. Denies any discomfort. Afebrile.     Concerns for physician to address:       Zone phone for oncoming shift:   [phone]       Activity:  Level of Assistance: Minimal assist, patient does 75% or more  Number times ambulated in hallways past shift: 0  Number of times OOB to chair past shift: up ad cam    Cardiac:   Cardiac Monitoring: Yes           Access:  Current line(s): PIV     Genitourinary:   Urinary Status: Oliguria    Respiratory:   O2 Device: None (Room air)  Chronic home O2 use?: NO  Incentive spirometer at bedside: YES    GI:  Last BM (including prior to admit): 01/19/25  Current diet:  ADULT DIET; Regular; 4 carb choices (60 gm/meal); Low Fat/Low Chol/High Fiber/TONYA  Passing flatus: YES    Pain Management:   Patient states pain is manageable on current regimen: YES    Skin:  Pedro Scale Score: 18  Interventions: Wound Offloading (Prevention Methods): Pillows, Repositioning    Patient Safety:  Fall Risk: Nursing Judgement-Fall Risk High(Add Comments): No  Fall Risk Interventions  Nursing Judgement-Fall Risk High(Add Comments): No  Toilet Every 2 Hours-In Advance of Need: Yes  Hourly Visual Checks: In bed, Awake  Fall Visual Posted: Socks  Room Door Open: Yes  Alarm On: Bed  Patient Moved Closer to Nursing Station: No    Active Consults:   IP CONSULT TO NEPHROLOGY  IP CONSULT TO PHARMACY  IP CONSULT TO UROLOGY    Length of Stay:  Expected LOS: 5  Actual LOS: 5    Flip Spence, RN                           
End of Shift Note    Bedside shift change report given to Samuel (oncoming nurse) by Luisa Lucero RN (offgoing nurse).  Report included the following information SBAR, Kardex, and MAR    Shift worked:  7p-7a     Shift summary and any significant changes:     Scheduled medications were given, see MAR.  IV has been flushed and is patent.  Patient is up with the assistance of one with a Roll-aider.  Patient teaching and routine rounding has been done.      Concerns for physician to address:       Zone phone for oncoming shift:          Activity:  Level of Assistance: Minimal assist, patient does 75% or more  Number times ambulated in hallways past shift: 0  Number of times OOB to chair past shift: 0    Cardiac:   Cardiac Monitoring: No           Access:  Current line(s): PIV     Genitourinary:   Urinary Status: Oliguria    Respiratory:   O2 Device: None (Room air)  Chronic home O2 use?: NO  Incentive spirometer at bedside: NO    GI:  Last BM (including prior to admit): 01/19/25  Current diet:  ADULT DIET; Regular; 4 carb choices (60 gm/meal); Low Fat/Low Chol/High Fiber/TONYA  Passing flatus: YES    Pain Management:   Patient states pain is manageable on current regimen: YES    Skin:  Pedro Scale Score: 19  Interventions: Wound Offloading (Prevention Methods): Blankets, Pillows, Repositioning, Turning    Patient Safety:  Fall Risk: Nursing Judgement-Fall Risk High(Add Comments): No  Fall Risk Interventions  Nursing Judgement-Fall Risk High(Add Comments): No  Toilet Every 2 Hours-In Advance of Need: Yes  Hourly Visual Checks: Awake, In bed  Fall Visual Posted: Armband, Socks, Fall sign posted  Room Door Open: Yes  Alarm On: Bed  Patient Moved Closer to Nursing Station: No    Active Consults:   IP CONSULT TO NEPHROLOGY  IP CONSULT TO PHARMACY  IP CONSULT TO UROLOGY    Length of Stay:  Expected LOS: 8  Actual LOS: 6    Luisa Lucero RN                            
End of Shift Note    Bedside shift change report given to Samuel LOUISE (oncoming nurse) by MARK Corona RN (offgoing nurse).  Report included the following information SBAR, Intake/Output, and MAR    Shift worked:  7PM-7AM     Shift summary and any significant changes:     Pt complained of knee pain x1 and tylenol given. No c/o nausea. Diet well tolerated. X2 BM in this shift.Otherwise uneventful shift.     Concerns for physician to address:       Zone phone for oncoming shift:              MARK Corona RN                           
End of Shift Note    Bedside shift change report given to handy LOUISE (oncoming nurse) by Luisa aFgan RN (offgoing nurse).  Report included the following information SBAR, Intake/Output, and MAR    Shift worked:  7A-7P     Shift summary and any significant changes:     No complaints this shift, pt had HD today, BP remained on the low end post dialysis. Pt was able to get up and go to the bathroom with 1 assist but felt weak, pt up the the chair one time this shift and back to bed after going to the bathroom and feeling weak. Ambulating to the bathroom with front rolling walker.Diet well tolerated. HD today. Iv removed, provider aware. No need for iv meds per provider      Concerns for physician to address:       Zone phone for oncoming shift:   [phone]         Luisa Fagan, RN                           
End of Shift Note    Bedside shift change report given to night nurse (oncoming nurse) by Jersey Mukherjee RN (offgoing nurse).  Report included the following information SBAR    Level of Assistance: Moderate assist, patient does 50-74%  Number times ambulated in hallways past shift: 0  Number of times OOB to chair past shift: 1    Cardiac:   Cardiac Monitoring: No           Access:  Current line(s): PIV     Genitourinary:   Urinary Status: Has not voided, Oliguria    Respiratory:   O2 Device: None (Room air)  Chronic home O2 use?: NO  Incentive spirometer at bedside: NO    GI:  Last BM (including prior to admit): 01/16/25  Current diet:  ADULT DIET; Regular; 4 carb choices (60 gm/meal); Low Fat/Low Chol/High Fiber/TONYA  Passing flatus: NO    Pain Management:   Patient states pain is manageable on current regimen: YES    Skin:  Pedro Scale Score: 18  Interventions: Wound Offloading (Prevention Methods): Repositioning, Turning    Patient Safety:  Fall Risk: Nursing Judgement-Fall Risk High(Add Comments): Yes  Fall Risk Interventions  Nursing Judgement-Fall Risk High(Add Comments): Yes  Toilet Every 2 Hours-In Advance of Need: Yes  Hourly Visual Checks: In bed  Fall Visual Posted: Other (comment)  Room Door Open: Deferred to promote rest  Alarm On: Other (Comment)  Patient Moved Closer to Nursing Station: No    Active Consults:   IP CONSULT TO NEPHROLOGY  IP CONSULT TO PHARMACY  IP CONSULT TO UROLOGY    Length of Stay:  Expected LOS: 3  Actual LOS: 3    Jersey Mukherjee RN                           
Occupational Therapy    10:00 am Chart reviewed. Pt currently off floor at HD. Will defer and follow-up later as able and medically appropriate. Thanks.    11:40 am  Pt remains off floor. Will defer and follow-up later as able/appropriate.    Rosemarie Crenshaw MS, OTR/L  
Occupational Therapy    Chart reviewed in prep for skilled OT treatment; however, pt BO for HD.  Will defer and continue to follow.    Thank you,  Matthew Kerley, OT    
Occupational Therapy    Chart reviewed. Spoke with RN. Pt currently off floor at HD. Will defer and follow-up later as able and medically appropriate. Thanks.    Rosemarie Crenshaw MS, OTR/L  
Occupational Therapy Note:    Chart reviewed. Pt currently on dialysis and not available for OT session. Will defer and continue to follow. Thank you.     Nemo Seymour OTR/ANNALEE  
Patient admitted by my partner early in a.m.    Complicated UTI  ESRD on MWF IHD  Multiple renal hypodensities of unclear significance  Essential hypertension  Hyperlipidemia  Diabetes mellitus  COPD  MDD, UNA  Obesity class III by BMI 42.05    -Follow urine culture results.  Continue ceftriaxone  Renal consulted for hemodialysis needs    Nonbillable rounding  
Pharmacy Medication History Review    Medication history obtained by Juliana Lau while patient was in room CD33/33 and was completed based on information available during current patient encounter. Medication history was completed after home meds were reconciled by provider.    Pharmacist Admission Medication Reconciliation Recommendations:            PTA medication list was corrected to the following:   Prior to Admission Medications   Prescriptions Last Dose Informant   Methoxy PEG-Epoetin Beta (MIRCERA) 30 MCG/0.3ML SOSY 2025 Other, Self   Sig: Inject 30 mcg as directed every 14 days At dialysis   Multiple Vitamins-Minerals (OCUVITE EYE HEALTH FORMULA) CAPS 1/15/2025 Self   Sig: Take 1 capsule by mouth daily   PARoxetine (PAXIL) 30 MG tablet 1/15/2025 Self   Sig: Take 2 tablets by mouth daily   QUEtiapine (SEROQUEL) 25 MG tablet 1/15/2025 Self   Sig: Take 1 tablet by mouth 3 times daily   acetaminophen (TYLENOL) 500 MG tablet Past Week Self   Sig: Take 1 tablet by mouth every 6 hours as needed   albuterol (PROVENTIL) (2.5 MG/3ML) 0.083% nebulizer solution Past Month Self   Sig: Inhale 1.5 mLs into the lungs every 4 hours as needed   albuterol sulfate HFA (PROVENTIL;VENTOLIN;PROAIR) 108 (90 Base) MCG/ACT inhaler Past Month Self   Sig: Inhale 2 puffs into the lungs every 6 hours as needed   aspirin 81 MG EC tablet 1/15/2025 Self   Sig: Take 1 tablet by mouth daily   atorvastatin (LIPITOR) 40 MG tablet 1/15/2025 Self   Sig: Take 1 tablet by mouth daily   bumetanide (BUMEX) 2 MG tablet 1/15/2025 Self   Sig: Take 0.5 tablets by mouth daily   calcitRIOL (ROCALTROL) 0.25 MCG capsule 1/15/2025 Other, Self   Sig: Take 3 capsules by mouth Every Monday, Wednesday, and Friday At dialysis   fluticasone (FLONASE) 50 MCG/ACT nasal spray 1/15/2025 Self   Si spray by Nasal route 2 times daily   fluticasone-salmeterol (ADVAIR) 250-50 MCG/ACT AEPB diskus inhaler 1/15/2025 Self   Sig: Inhale 1 puff into the lungs in the 
Physical Therapy    Chart reviewed. Patient off unit at dialysis at time of attempt. Will continue to follow.    Carina Fu, PT, DPT  
Physical Therapy    Chart reviewed. Spoke with RN. Pt currently off floor at HD. Will defer and follow-up later as able and medically appropriate. Thanks.      
Physical Therapy    Received order for PT eval. Pt jase BO for HD. Will follow.    Dolly Cisse PT  
Pt exit via stretcher. No distress noted.   
Pt taken to dialysis she didn't want her lantus.    
Spiritual Health History and Assessment/Progress Note  [facility]    Initial Encounter,  ,  ,      Name: Tiera Ramon MRN: [id number]    Age: 69 y.o.     Sex: female   Language: English   Scientologist: Other   Complicated UTI (urinary tract infection)     Date: 1/24/2025            Total Time Calculated: 25 min              Spiritual Assessment began in [facility] 3 SURG TELE        Referral/Consult From: Multi-disciplinary team   Encounter Overview/Reason: Initial Encounter  Service Provided For: Patient    Sarah, Belief, Meaning:   Patient identifies as spiritual and is connected with a sarah tradition or spiritual practice  Family/Friends No family/friends present      Importance and Influence:  Patient has spiritual/personal beliefs that influence decisions regarding their health  Family/Friends No family/friends present    Community:  Patient expresses feelings of isolation: disconnected from family/friends: States that family/friends call, but are too busy to come visit in person.  Family/Friends No family/friends present    Assessment and Plan of Care:     Patient Interventions include: Facilitated expression of thoughts and feelings and Affirmed coping skills/support systems  Family/Friends Interventions include: No family/friends present    Patient Plan of Care: Spiritual Care available upon further referral  Family/Friends Plan of Care: No family/friends present    Electronically signed by Chaplain Nellie on 1/24/2025 at 4:22 PM    
(Last 24 hours) at 1/22/2025 1329  Last data filed at 1/22/2025 1230  Gross per 24 hour   Intake 500 ml   Output 2000 ml   Net -1500 ml          I had a face to face encounter and independently examined this patient on 1/22/2025, as outlined below:  PHYSICAL EXAM:  General: Alert, cooperative  EENT:  EOMI. Anicteric sclerae.  Resp:  CTA bilaterally, no wheezing or rales.  No accessory muscle use  CV:  Regular  rhythm,  No edema  GI:  Soft, Non distended, Non tender.  +Bowel sounds  Neurologic:  Alert and awake, moving all 4 extremities  Psych:   Good insight. Not anxious nor agitated  Skin:  No rashes.  No jaundice    Reviewed most current lab test results and cultures  YES  Reviewed most current radiology test results   YES  Review and summation of old records today    NO  Reviewed patient's current orders and MAR    YES  PMH/SH reviewed - no change compared to H&P    Procedures: see electronic medical records for all procedures/Xrays and details which were not copied into this note but were reviewed prior to creation of Plan.      LABS:  I reviewed today's most current labs and imaging studies.  Pertinent labs include:  Recent Labs     01/20/25  1002   WBC 12.3*   HGB 10.4*   HCT 33.6*        Recent Labs     01/20/25  1002   *   K 4.8   CL 96*   CO2 26   GLUCOSE 115*   BUN 55*   CREATININE 7.66*   CALCIUM 8.8       Signed: Yulia Mccallum MD         
rhythm,  No edema  GI:  Soft, Non distended, Non tender.  +Bowel sounds  Neurologic:  Alert and awake, moving all 4 extremities  Psych:   Good insight. Not anxious nor agitated  Skin:  No rashes.  No jaundice    Reviewed most current lab test results and cultures  YES  Reviewed most current radiology test results   YES  Review and summation of old records today    NO  Reviewed patient's current orders and MAR    YES  PMH/SH reviewed - no change compared to H&P    Procedures: see electronic medical records for all procedures/Xrays and details which were not copied into this note but were reviewed prior to creation of Plan.      LABS:  I reviewed today's most current labs and imaging studies.  Pertinent labs include:  Recent Labs     01/16/25  0817 01/17/25  0524 01/18/25  0716   WBC 10.8 11.6* 11.0   HGB 10.6* 11.4* 10.4*   HCT 35.4 37.9 35.1    176 149*     Recent Labs     01/16/25  0623 01/17/25  0524 01/18/25  0716   * 133* 133*   K 4.1 4.6 4.3    96* 98   CO2 26 28 28   GLUCOSE 205* 87 214*   BUN 29* 43* 26*   CREATININE 4.14* 6.06* 4.75*   CALCIUM 8.4* 9.4 8.9       Signed: Derek Blakely MD